# Patient Record
Sex: MALE | Race: WHITE | Employment: OTHER | URBAN - METROPOLITAN AREA
[De-identification: names, ages, dates, MRNs, and addresses within clinical notes are randomized per-mention and may not be internally consistent; named-entity substitution may affect disease eponyms.]

---

## 2018-10-10 ENCOUNTER — HOSPITAL ENCOUNTER (EMERGENCY)
Age: 83
Discharge: HOME | End: 2018-10-10
Attending: EMERGENCY MEDICINE
Payer: MEDICARE

## 2018-10-10 ENCOUNTER — APPOINTMENT (OUTPATIENT)
Dept: CT IMAGING | Age: 83
End: 2018-10-10
Attending: EMERGENCY MEDICINE
Payer: MEDICARE

## 2018-10-10 VITALS
SYSTOLIC BLOOD PRESSURE: 145 MMHG | RESPIRATION RATE: 16 BRPM | DIASTOLIC BLOOD PRESSURE: 108 MMHG | OXYGEN SATURATION: 95 % | TEMPERATURE: 98.2 F
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
## 2018-10-10 DIAGNOSIS — S01.01XA LACERATION OF SCALP, INITIAL ENCOUNTER: ICD-10-CM

## 2018-10-10 LAB
ABO + RH BLD: NORMAL
ALBUMIN SERPL-MCNC: 3.3 G/DL (ref 3.5–5)
ALBUMIN/GLOB SERPL: 0.6 {RATIO} (ref 1.1–2.2)
ALP SERPL-CCNC: 79 U/L (ref 45–117)
ALT SERPL-CCNC: 38 U/L (ref 12–78)
ANION GAP SERPL CALC-SCNC: 10 MMOL/L (ref 5–15)
APPEARANCE UR: CLEAR
APTT PPP: 27 SEC (ref 22.1–32)
AST SERPL-CCNC: 112 U/L (ref 15–37)
BACTERIA URNS QL MICRO: NEGATIVE /HPF
BASOPHILS # BLD: 0.1 K/UL (ref 0–0.1)
BASOPHILS NFR BLD: 1 % (ref 0–1)
BILIRUB SERPL-MCNC: 1.4 MG/DL (ref 0.2–1)
BILIRUB UR QL: NEGATIVE
BLOOD BANK CMNT PATIENT-IMP: NORMAL
BLOOD GROUP ANTIBODIES SERPL: NORMAL
BUN SERPL-MCNC: 13 MG/DL (ref 6–20)
BUN/CREAT SERPL: 7 (ref 12–20)
CALCIUM SERPL-MCNC: 11.2 MG/DL (ref 8.5–10.1)
CHLORIDE SERPL-SCNC: 97 MMOL/L (ref 97–108)
CO2 SERPL-SCNC: 26 MMOL/L (ref 21–32)
COLOR UR: ABNORMAL
CREAT SERPL-MCNC: 1.97 MG/DL (ref 0.7–1.3)
DIFFERENTIAL METHOD BLD: ABNORMAL
EOSINOPHIL # BLD: 0 K/UL (ref 0–0.4)
EOSINOPHIL NFR BLD: 1 % (ref 0–7)
EPITH CASTS URNS QL MICRO: ABNORMAL /LPF
ERYTHROCYTE [DISTWIDTH] IN BLOOD BY AUTOMATED COUNT: 14.6 % (ref 11.5–14.5)
GLOBULIN SER CALC-MCNC: 5.8 G/DL (ref 2–4)
GLUCOSE SERPL-MCNC: 92 MG/DL (ref 65–100)
GLUCOSE UR STRIP.AUTO-MCNC: NEGATIVE MG/DL
HCT VFR BLD AUTO: 43.1 % (ref 36.6–50.3)
HGB BLD-MCNC: 14.7 G/DL (ref 12.1–17)
HGB UR QL STRIP: NEGATIVE
IMM GRANULOCYTES # BLD: 0.1 K/UL (ref 0–0.04)
IMM GRANULOCYTES NFR BLD AUTO: 2 % (ref 0–0.5)
INR PPP: 1.2 (ref 0.9–1.1)
KETONES UR QL STRIP.AUTO: NEGATIVE MG/DL
LEUKOCYTE ESTERASE UR QL STRIP.AUTO: NEGATIVE
LYMPHOCYTES # BLD: 1.5 K/UL (ref 0.8–3.5)
LYMPHOCYTES NFR BLD: 24 % (ref 12–49)
MCH RBC QN AUTO: 34.3 PG (ref 26–34)
MCHC RBC AUTO-ENTMCNC: 34.1 G/DL (ref 30–36.5)
MCV RBC AUTO: 100.5 FL (ref 80–99)
MONOCYTES # BLD: 0.4 K/UL (ref 0–1)
MONOCYTES NFR BLD: 6 % (ref 5–13)
NEUTS SEG # BLD: 3.9 K/UL (ref 1.8–8)
NEUTS SEG NFR BLD: 66 % (ref 32–75)
NITRITE UR QL STRIP.AUTO: NEGATIVE
NRBC # BLD: 0 K/UL (ref 0–0.01)
NRBC BLD-RTO: 0 PER 100 WBC
PH UR STRIP: 6.5 [PH] (ref 5–8)
PLATELET # BLD AUTO: 150 K/UL (ref 150–400)
PMV BLD AUTO: 13 FL (ref 8.9–12.9)
POTASSIUM SERPL-SCNC: 3.9 MMOL/L (ref 3.5–5.1)
PROT SERPL-MCNC: 9.1 G/DL (ref 6.4–8.2)
PROT UR STRIP-MCNC: ABNORMAL MG/DL
PROTHROMBIN TIME: 11.8 SEC (ref 9–11.1)
RBC # BLD AUTO: 4.29 M/UL (ref 4.1–5.7)
RBC #/AREA URNS HPF: ABNORMAL /HPF (ref 0–5)
SODIUM SERPL-SCNC: 133 MMOL/L (ref 136–145)
SP GR UR REFRACTOMETRY: 1.01 (ref 1–1.03)
SPECIMEN EXP DATE BLD: NORMAL
THERAPEUTIC RANGE,PTTT: NORMAL SECS (ref 58–77)
TROPONIN I SERPL-MCNC: <0.05 NG/ML
UA: UC IF INDICATED,UAUC: ABNORMAL
UR CULT HOLD, URHOLD: NORMAL
UROBILINOGEN UR QL STRIP.AUTO: 2 EU/DL (ref 0.2–1)
WBC # BLD AUTO: 6 K/UL (ref 4.1–11.1)
WBC URNS QL MICRO: ABNORMAL /HPF (ref 0–4)

## 2018-10-10 PROCEDURE — 77030018836 HC SOL IRR NACL ICUM -A

## 2018-10-10 PROCEDURE — 97161 PT EVAL LOW COMPLEX 20 MIN: CPT

## 2018-10-10 PROCEDURE — 86900 BLOOD TYPING SEROLOGIC ABO: CPT | Performed by: EMERGENCY MEDICINE

## 2018-10-10 PROCEDURE — 36415 COLL VENOUS BLD VENIPUNCTURE: CPT | Performed by: EMERGENCY MEDICINE

## 2018-10-10 PROCEDURE — 85610 PROTHROMBIN TIME: CPT | Performed by: EMERGENCY MEDICINE

## 2018-10-10 PROCEDURE — 85730 THROMBOPLASTIN TIME PARTIAL: CPT | Performed by: EMERGENCY MEDICINE

## 2018-10-10 PROCEDURE — 90471 IMMUNIZATION ADMIN: CPT

## 2018-10-10 PROCEDURE — G8979 MOBILITY GOAL STATUS: HCPCS

## 2018-10-10 PROCEDURE — 90715 TDAP VACCINE 7 YRS/> IM: CPT | Performed by: EMERGENCY MEDICINE

## 2018-10-10 PROCEDURE — 81001 URINALYSIS AUTO W/SCOPE: CPT | Performed by: EMERGENCY MEDICINE

## 2018-10-10 PROCEDURE — G8978 MOBILITY CURRENT STATUS: HCPCS

## 2018-10-10 PROCEDURE — 84484 ASSAY OF TROPONIN QUANT: CPT | Performed by: EMERGENCY MEDICINE

## 2018-10-10 PROCEDURE — 99285 EMERGENCY DEPT VISIT HI MDM: CPT

## 2018-10-10 PROCEDURE — G8980 MOBILITY D/C STATUS: HCPCS

## 2018-10-10 PROCEDURE — 70450 CT HEAD/BRAIN W/O DYE: CPT

## 2018-10-10 PROCEDURE — 74011250636 HC RX REV CODE- 250/636: Performed by: EMERGENCY MEDICINE

## 2018-10-10 PROCEDURE — 77030008460 HC STPLR SKN PRECIS 3M -A

## 2018-10-10 PROCEDURE — 97116 GAIT TRAINING THERAPY: CPT

## 2018-10-10 PROCEDURE — 85025 COMPLETE CBC W/AUTO DIFF WBC: CPT | Performed by: EMERGENCY MEDICINE

## 2018-10-10 PROCEDURE — 75810000293 HC SIMP/SUPERF WND  RPR

## 2018-10-10 PROCEDURE — 80053 COMPREHEN METABOLIC PANEL: CPT | Performed by: EMERGENCY MEDICINE

## 2018-10-10 PROCEDURE — 72125 CT NECK SPINE W/O DYE: CPT

## 2018-10-10 RX ADMIN — TETANUS TOXOID, REDUCED DIPHTHERIA TOXOID AND ACELLULAR PERTUSSIS VACCINE, ADSORBED 0.5 ML: 5; 2.5; 8; 8; 2.5 SUSPENSION INTRAMUSCULAR at 10:23

## 2018-10-10 NOTE — ED PROVIDER NOTES
HPI Comments: 80 y.o. male with past medical history significant for HTN, GERD, high cholesterol, DJD, prostate ca, hyperparathyroidism, graves dz, who presents from EMS for evaluation of fall. Pt has 5 day onset of fall resulting in trauma to the head and right face under unknown circumstances. He incurred a laceration on his head, and observed bleeding from the wounded area. Pt has no memory of the event, and wasn't medically evaluated afterwards. He was observed this morning on camera sliding out of bed at 5:00 AM. Pt lives w/ daughter at home. No anti-coagulants being taken; unsure compliance w/ other medications. There are no other acute medical concerns at this time. PCP: Jose Saeed MD 
 
Full history, physical exam, and ROS unable to be obtained due to:  Poor historian. Note written by Karime Beckham, as dictated by Loliat Arenas MD 9:35 AM 
 
The history is provided by the patient. History limited by: poor historian. No  was used. Past Medical History:  
Diagnosis Date  Cancer [facility])   
 prostate  DJD (degenerative joint disease)  Elevated cholesterol  GERD (gastroesophageal reflux disease)  Hyperparathyroidism, primary ([facility] [address]  Hypertension  Obstructive sleep apnea   
 uses cpap  Thyroid disease HX of Graves Disease Past Surgical History:  
Procedure Laterality Date  HX HEENT    
 thyroidectomy  HX HEENT    
 tonsilectomy [address]  HX ORTHOPAEDIC    
 right femoral neck fracture  HX ORTHOPAEDIC    
 bilateral ankle surgery  HX ORTHOPAEDIC  12/12/12 LEFT ANKLE REFUSION AND REMOVAL POSTERIOR SCREW  
 [place] COLONOSCOPY FLX DX W/COLLJ SPEC WHEN PFRMD  12/11/2006 Dr Maria Alejandra Martell  repeat 12/2011  [place] PROSTATE BIOPSY, NEEDLE, SATURATION SAMPLING Family History:  
Problem Relation Age of Onset  Hypertension Mother  Heart Disease Father CAD  Heart Disease Brother Social History Social History  Marital status:  Spouse name: N/A  
 Number of children: N/A  
 Years of education: N/A Occupational History  Not on file. Social History Main Topics  Smoking status: Never Smoker  Smokeless tobacco: Never Used  Alcohol use Yes Comment: rare  Drug use: No  
 Sexual activity: Not on file Other Topics Concern  Not on file Social History Narrative ALLERGIES: Review of patient's allergies indicates no known allergies. Review of Systems Unable to perform ROS: Other Vitals:  
 10/10/18 0930 BP: (!) 146/100 SpO2: 93% Physical Exam  
Constitutional: He is oriented to person, place, and time. He appears well-developed and well-nourished. No distress. Awake, alert, no memory recall HENT:  
Head: Normocephalic and atraumatic. Eyes: Pupils are equal, round, and reactive to light. Neck: Normal range of motion. Neck supple. Cardiovascular: Normal rate, regular rhythm and normal heart sounds. Exam reveals no gallop and no friction rub. No murmur heard. Pulmonary/Chest: Effort normal and breath sounds normal. No respiratory distress. He has no wheezes. Abdominal: Soft. Bowel sounds are normal. He exhibits no distension. There is no tenderness. There is no rebound and no guarding. Musculoskeletal: Normal range of motion. Neurological: He is alert and oriented to person, place, and time. Skin: Skin is warm. Laceration (back of head) noted. No rash noted. He is not diaphoretic.  
scattered skin tears on UE and LE; hemostatic Psychiatric: He has a normal mood and affect. His behavior is normal. Judgment and thought content normal.  
Nursing note and vitals reviewed. [facility] 
 
 
ED Course This is an 55-year-old male with past medical history, review of systems, physical exam as above, presenting with complaints of a level fall and head injury. His daughter states he had a fall out of bed last week, for which he was not evaluated. She states recently installed camera, identified him falling out of bed between 4 and 5 this morning. He presents awake and alert, without complaint, however with a significant amount of dry blood on his occiput. He has otherwise unremarkable physical exam with the exception of scattered hemostatic skin tears, ecchymosis, clear breath sounds, soft nontender abdomen. He denies using anticoagulants. Plan to obtain CT imaging of the head and C-spine, CBC, CMP, cardiac enzymes, UA. Will provide wound care, and make a disposition the patient's diagnostics and response to therapy. WOUND REPAIR Date/Time: 10/10/2018 12:26 PM 
Preparation: sterile field established Location details: scalp Wound length:2.6 - 7.5 cm Foreign bodies: no foreign bodies Irrigation solution: saline Irrigation method: tap Debridement: none Skin closure: staples Number of sutures: 5 Technique: simple and interrupted Approximation: close Patient tolerance: Patient tolerated the procedure well with no immediate complications My total time at bedside, performing this procedure was 1-15 minutes. 1:08 PM 
Patient with unremarkable imaging and lab work, patient evaluated by CM and PT, home health services being established. Will Dc home with fall precautions, staple removal and return precautions.

## 2018-10-10 NOTE — SENIOR SERVICES NOTE
physical Therapy Emergency Department EVALUATION/DISCHARGE Patient: Ora Conley (41 y.o. male) Date: 10/10/2018 Primary Diagnosis: There are no admission diagnoses documented for this encounter. Precautions: [place]; fall risk ASSESSMENT : 
Chart reviewed. Patient cleared to be seen by MD. Patient presents to the ED s/p a fall at home resulting in head laceration. Awaiting staples. Head and C-spine CT negative for fracture. Of concern, this is the patient's second fall in 4 days. Patient is [place] and has some confusion but answering questions appropriately. Daughters at bedside. Daughter reporting that his legs \"seem to give out\". No LOC. Patient needing mod assist to sit EOB. Patient has a large bed at home in which he holds onto the bed frame to get himself out of bed. BP high but stable with positional change. No dizziness reported. During bed mobility, patient c/o of right lateral mid back pain. Daughter concerned because patient rarely complains of pain. No tenderness to palpation and pain is not exacerbated with standing or walking. No bruising. Suspect soreness from fall. Patient did not mention the pain again throughout treatment. Ambulating x 120 feet with RW, CGA. Narrow MARC observed with decreased step length. Patient and family confirming that patient is \"walking normally\". No LOB or unsteadiness observed. No pain or weakness observed in the LEs. As patient has had two falls in the last 4 days, recommend HHPT/OT for home safety evaluation, balance retraining and generalized strengthening. CM aware and order placed. Further acute physical therapy is not indicated at this time. PLAN : 
Discharge Recommendations:  
 
[]   Home with family []   Skilled nursing facility []   Admission to hospital with rehab likely needed 
[]   Inpatient rehab referral 
[]   Outpatient physical therapy referral 
[x]   Other: HHPT/ OT services Further Equipment Recommendations for Discharge:  none 
[]   Rolling walker with 5\" wheels 
[]   Crutches  
[]   Cane  
[]   Wheelchair  
[]   Other: COMMUNICATION/EDUCATION:  
Communication/Collaboration: 
[x]   Fall prevention education was provided and the patient/caregiver indicated understanding. [x]   Patient/family have participated as able and agree with findings and recommendations. []   Patient is unable to participate in plan of care at this time. Findings and recommendations were discussed with: MD physician and  SUBJECTIVE:  
Patient stated I think lunch is on you.  OBJECTIVE DATA SUMMARY:  
HISTORY:   
Past Medical History:  
Diagnosis Date  Cancer [facility])   
 prostate  DJD (degenerative joint disease)  Elevated cholesterol  GERD (gastroesophageal reflux disease)  Hyperparathyroidism, primary ([facility] [address]  Hypertension  Obstructive sleep apnea   
 uses cpap  Thyroid disease HX of Graves Disease Past Surgical History:  
Procedure Laterality Date  HX HEENT    
 thyroidectomy  HX HEENT    
 tonsilectomy [address] [id number]  HX ORTHOPAEDIC    
 right femoral neck fracture  HX ORTHOPAEDIC    
 bilateral ankle surgery  HX ORTHOPAEDIC  12/12/12 LEFT ANKLE REFUSION AND REMOVAL POSTERIOR SCREW  
 [facility] COLONOSCOPY FLX DX W/COLLJ SPEC WHEN PFRMD  12/11/2006 Dr Melisas Bhat  repeat 12/2011  [facility] PROSTATE BIOPSY, NEEDLE, SATURATION SAMPLING Prior Level of Function/Home Situation: Patient lives with family members. Ambulates short household distances with a rollator. Second fall within 4 days. Personal factors and/or comorbidities impacting plan of care:  
 
Home Situation Home Environment: Private residence # Steps to Enter: 0 One/Two Story Residence: One story Living Alone: No 
Support Systems: Child(lexi), Family member(s) Patient Expects to be Discharged to[de-identified] Private residence Current DME Used/Available at Home: Maribel Puente, rollator, Walker, rolling EXAMINATION/PRESENTATION/DECISION MAKING:  
  
Strength:   
Strength: Generally decreased, functional 
 
Tone & Sensation:  
Tone: Normal 
   
Coordination: 
Coordination: Generally decreased, functional 
 
Functional Mobility: 
Bed Mobility: 
Supine to Sit: Moderate assistance;Assist x1 Sit to Supine: Contact guard assistance Transfers: 
Sit to Stand: Contact guard assistance Stand to Sit: Contact guard assistance Balance:  
Sitting: Impaired Sitting - Static: Good (unsupported) Sitting - Dynamic: Fair (occasional) Standing: Impaired Standing - Static: Good;Constant support Standing - Dynamic : Fair Ambulation/Gait Training: 
Distance (ft): 120 Feet (ft) Assistive Device: Gait belt;Walker, rolling Ambulation - Level of Assistance: Contact guard assistance; Additional time; Adaptive equipment Gait Abnormalities: Decreased step clearance;Scissoring Base of Support: Narrowed Speed/Allison: Slow Step Length: Right shortened;Left shortened Special Tests: 
10 Meter walk test:  (Specify if any supplemental oxygen is used, the type, pre, during and post sats.) Self-Selected Or Fast-Velocity: Self Selected Velocity Trial 1: 29 Seconds Trial 2: 29 Seconds Trial 3: 29 Seconds Average : 29 Seconds Score: 0.21 m/s Walking Speed (m/s) Modifier Scale Age 52-63 Age 61-76 Age 66-77 Age 80-80 Male Female Male Female Male Female Male Female CH 
 0% Impaired ? 1.39 ? 1.40 ? 1.36 ? 1.30 ? 1.33 ? 1.27 ? 1.21 ? 1.15  
CI  
1-19% Impaired 1.11-1.38 1.12-1.39 1.09-1.35 1.04-1.29 1.06-1.32 1.01-1.26 0.96-1.20 0.92-1.14 CJ  
20-39% Impaired 0.83-1.10 0.84-1.11 0.82-1.08 0.78-1.03 0.80-1.05 0.76-1.00 0.72-0.95 0.69-0.91 CK  
40-59% Impaired 0.56-0.82 0.57-0.83 0.54-0.81 0.52-0.77 0.53-0.79 0.51-0.75 0.48-0.71 0.46-0.68 CL  
60-79% Impaired 0.28-0.55 0.28-0.56 0.27-0.53 0.26-0.51 0.27-0.52 0.25-0.50 0.24-0.49 0.23-0.45  
 CM 
 80-99% Impaired 0.01-0.28 < 0.01-0.28 < 0.01-0.27 < 0.01-0.26 0.01-0.27 0.01-0.24 0.01-0.23 0.01-0.22  
CN  
100% Impaired Cannot Perform Minimal Detectable Change (MDC-90) = 0.1 m/s Dimitri SIDHU. \"Comfortable and maximum walking speed of adults aged 20-79 years: reference values and determinants. \" Age and Agin Volume 26(1):15-9. Scottie Jones. \"Age- and gender-related test performance in community-dwelling elderly people: Six-Minute Walk Test, Ward Balance Scale, Timed Up & Go Test, and gait speeds. \" Physical Therapy: 2002 Volume 82(2):128-37. Augusto Harvey DM, Navdeep ESPINOZA, Luis Daniel SHEPARDD, Dawood JORDAN. \"Assessing stability and change of four performance measures: a longitudinal study evaluating outcome following total hip and knee arthroplasty. \" [facility] Musculoskeletal Disorders: 2005 Volume 6(3). Ricardo Garcia, PhD; Belgica Harris, PhD. Isabel Factor Paper: \"Walking Speed: the Sixth Vital Sign\" Journal of Geriatric Physical Therapy: 2009 - Volume 32 - Issue 2 - p 25 . In compliance with CMSs Claims Based Outcome Reporting, the following G-code set was chosen for this patient based on their primary functional limitation being treated: The outcome measure chosen to determine the severity of the functional limitation was the 10 MWT with a score of 0.21 m/s which was correlated with the impairment scale. ? Mobility - Walking and Moving Around:  
  - CURRENT STATUS: CM - 80%-99% impaired, limited or restricted  - GOAL STATUS: CM - 80%-99% impaired, limited or restricted  - D/C STATUS:  CM - 80%-99% impaired, limited or restricted Physical Therapy Evaluation Charge Determination History Examination Presentation Decision-Making MEDIUM  Complexity : 1-2 comorbidities / personal factors will impact the outcome/ POC  LOW Complexity : 1-2 Standardized tests and measures addressing body structure, function, activity limitation and / or participation in recreation  LOW Complexity : Stable, uncomplicated  LOW Complexity : FOTO score of  Based on the above components, the patient evaluation is determined to be of the following complexity level: LOW Pain: 
 Patient reporting minimal R lateral mid back pain with bed mobility. No tenderness to palpation. No bruising. Not exacerbated with with standing or walking. Activity Tolerance:  
WFL. Vitals stable. Please refer to the flowsheet for vital signs taken during this treatment. After treatment:  
[]         Patient left in no apparent distress sitting up in chair 
[x]         Patient left in no apparent distress in bed 
[x]         Call bell left within reach [x]         Nursing notified 
[x]         Caregiver present 
[]         Bed alarm activated Thank you for this referral. 
Kimberly Mcclelland PT, DPT Geriatric Clinical Specialist  
 
 Time Calculation: 22 mins

## 2018-10-10 NOTE — ED NOTES
Pt cleaned up for large amount of coagulated blood to pts head, face and neck. Laceration noted to L side, top of head. No active bleeding at this time.

## 2018-10-10 NOTE — PROGRESS NOTES
Date of previous inpatient admission/ ED visit? 4/21/16 ED visit What brought the patient back to ED? Patient presents to the ED s/p fall. Did patient decline recommended services during last admission/ ED visit (if yes, what)? No 
 
Has patient seen a provider since their last inpatient admission/ED visit (if yes, when)? Yes. Patient is followed  by Dr. Ramakrishna Asif. CM Interventions: 
From previous inpatient admission/ED visit: Assessment From current inpatient admission/ED visit: Assessment SSED/CM consult received and appreciated. EMR reviewed. History significant for HTN, GERD, high cholesterol, DJD, prostate ca, hyperparathyroidism and graves disease. Patient present to the ED for evaluation of fall. Met w/patient, daughter Lugene Gravely and granddaughters at bedside. Patient is alert however Alutiiq requested daughter provide history. Lenin Pinto lives in his own home and daughter Raegan Hickman moved in 11 years ago. Luana Diaz works W/F and granddaughter is home to assist [phone] during the weekday and daughter evenings/weekends. DME includes a rollator. No previous HH/ SNFs. Lenin Pinto was hospitalized at [facility] family estimates 5-10 yrs ago following hip surgery. Community resources provided including [facility] and Private Duty List. Informed CM to revisit after final results and PT evaluation. PCP is Dr. Ramakrishna Asif. Local Pharmacy is [facility]. [facility] Medicare A[facility]B and [employer] are insurance providers. Care Management Interventions PCP Verified by CM: Yes Last Visit to PCP: 08/10/18 Palliative Care Criteria Met (RRAT>21 & CHF Dx)?: No 
Transition of Care Consult (CM Consult): Discharge Planning MyChart Signup: No 
Discharge Durable Medical Equipment: No 
Health Maintenance Reviewed: Yes Physical Therapy Consult: Yes Occupational Therapy Consult: No 
Speech Therapy Consult: No 
Current Support Network: Own Home, Lives with Caregiver Confirm Follow Up Transport: Family Plan discussed with Pt/Family/Caregiver: Yes [employer] Information Provided?: No 
Discharge Location Discharge Placement:  (TBD)

## 2018-10-10 NOTE — PROGRESS NOTES
Order received for [facility]. Met back w/ patient and family provided choice list of [facility] agencies. [facility] does not have a preference. Demographics verified. CM will call home this afternoon and provided updates. Daughter to drive patient home when discharged. Updates provided to [facility].

## 2018-10-10 NOTE — ED TRIAGE NOTES
Pt arrives with \"slip and fall\" where pt hit head on a file cabinet. + laceration to top of head. EMS reports approx 100 mls. Pt is poor historian. Reports frequent falling recently.

## 2018-10-10 NOTE — PROGRESS NOTES
Referral sent to [facility] via [facility]. has accepted. CM provided Zerita Cover / daughter's # as point of reference since patient is [facility]. Call placed to [facility] and updates provided. No additional needs verbalized at this time.

## 2018-10-10 NOTE — DISCHARGE INSTRUCTIONS
Cuts Closed With Staples: Care Instructions  Your Care Instructions  A cut can happen anywhere on your body. The doctor used staples to close the cut. Staples easily and quickly close a cut, which helps the cut heal.  Sometimes a cut can injure tendons, blood vessels, or nerves. If the cut went deep and through the skin, the doctor may have put in a layer of stitches below the staples. The deeper layer of stitches brings the deep part of the cut together. These stitches will dissolve and don't need to be removed. The staples in the upper layer are what you see on the cut. You may have a bandage. You will need to have the staples removed, usually in 7 to 14 days. The doctor has checked you carefully, but problems can develop later. If you notice any problems or new symptoms, get medical treatment right away. Follow-up care is a key part of your treatment and safety. Be sure to make and go to all appointments, and call your doctor if you are having problems. It's also a good idea to know your test results and keep a list of the medicines you take. How can you care for yourself at home? · Keep the cut dry for the first 24 to 48 hours. After this, you can shower if your doctor okays it. Pat the cut dry. · Don't soak the cut, such as in a bathtub. Your doctor will tell you when it's safe to get the cut wet. · If your doctor told you how to care for your cut, follow your doctor's instructions. If you did not get instructions, follow this general advice:  ¨ After the first 24 to 48 hours, wash around the cut with clean water 2 times a day. Don't use hydrogen peroxide or alcohol, which can slow healing. ¨ You may cover the cut with a thin layer of petroleum jelly, such as Vaseline, and a nonstick bandage. ¨ Apply more petroleum jelly and replace the bandage as needed. · Avoid any activity that could cause your cut to reopen. · Do not remove the staples on your own.  Your doctor will tell you when to come back to have the staples removed. · Take pain medicines exactly as directed. ¨ If the doctor gave you a prescription medicine for pain, take it as prescribed. ¨ If you are not taking a prescription pain medicine, ask your doctor if you can take an over-the-counter medicine. When should you call for help? Call your doctor now or seek immediate medical care if:    · You have new pain, or your pain gets worse.     · The skin near the cut is cold or pale or changes color.     · You have tingling, weakness, or numbness near the cut.     · The cut starts to bleed, and blood soaks through the bandage. Oozing small amounts of blood is normal.     · You have trouble moving the area near the cut.     · You have symptoms of infection, such as:  ¨ Increased pain, swelling, warmth, or redness around the cut. ¨ Red streaks leading from the cut. ¨ Pus draining from the cut. ¨ A fever.    Watch closely for changes in your health, and be sure to contact your doctor if:    · You do not get better as expected. Where can you learn more? Go to http://yaHealthcareMagickeara.info/. Enter S027 in the search box to learn more about \"Cuts Closed With Staples: Care Instructions. \"  Current as of: November 20, 2017  Content Version: 11.8  © 6356-6968 [employer]. Care instructions adapted under license by [employer] (which disclaims liability or warranty for this information). If you have questions about a medical condition or this instruction, always ask your healthcare professional. [place] [address] any warranty or liability for your use of this information. Preventing Falls: Care Instructions  Your Care Instructions    Getting around your home safely can be a challenge if you have injuries or health problems that make it easy for you to fall.  Loose rugs and furniture in walkways are among the dangers for many older people who have problems walking or who have poor eyesight. People who have conditions such as arthritis, osteoporosis, or dementia also have to be careful not to fall. You can make your home safer with a few simple measures. Follow-up care is a key part of your treatment and safety. Be sure to make and go to all appointments, and call your doctor if you are having problems. It's also a good idea to know your test results and keep a list of the medicines you take. How can you care for yourself at home? Taking care of yourself  · You may get dizzy if you do not drink enough water. To prevent dehydration, drink plenty of fluids, enough so that your urine is light yellow or clear like water. Choose water and other caffeine-free clear liquids. If you have kidney, heart, or liver disease and have to limit fluids, talk with your doctor before you increase the amount of fluids you drink. · Exercise regularly to improve your strength, muscle tone, and balance. Walk if you can. Swimming may be a good choice if you cannot walk easily. · Have your vision and hearing checked each year or any time you notice a change. If you have trouble seeing and hearing, you might not be able to avoid objects and could lose your balance. · Know the side effects of the medicines you take. Ask your doctor or pharmacist whether the medicines you take can affect your balance. Sleeping pills or sedatives can affect your balance. · Limit the amount of alcohol you drink. Alcohol can impair your balance and other senses. · Ask your doctor whether calluses or corns on your feet need to be removed. If you wear loose-fitting shoes because of calluses or corns, you can lose your balance and fall. · Talk to your doctor if you have numbness in your feet. Preventing falls at home  · Remove raised doorway thresholds, throw rugs, and clutter. Repair loose carpet or raised areas in the floor. · Move furniture and electrical cords to keep them out of walking paths.   · Use nonskid floor wax, and wipe up spills right away, especially on ceramic tile floors. · If you use a walker or cane, put rubber tips on it. If you use crutches, clean the bottoms of them regularly with an abrasive pad, such as steel wool. · Keep your house well lit, especially [place], and outside walkways. Use night-lights in areas such as hallways and bathrooms. Add extra light switches or use remote switches (such as switches that go on or off when you clap your hands) to make it easier to turn lights on if you have to get up during the night. · Install sturdy handrails on stairways. · Move items in your cabinets so that the things you use a lot are on the lower shelves (about waist level). · Keep a cordless phone and a flashlight with new batteries by your bed. If possible, put a phone in each of the main rooms of your house, or carry a cell phone in case you fall and cannot reach a phone. Or, you can wear a device around your neck or wrist. You push a button that sends a signal for help. · Wear low-heeled shoes that fit well and give your feet good support. Use footwear with nonskid soles. Check the heels and soles of your shoes for wear. Repair or replace worn heels or soles. · Do not wear socks without shoes on wood floors. · Walk on the grass when the sidewalks are slippery. If you live in an area that gets snow and ice in the winter, sprinkle salt on slippery steps and sidewalks. Preventing falls in the bath  · Install grab bars and nonskid mats inside and outside your shower or tub and near the toilet and sinks. · Use shower chairs and bath benches. · Use a hand-held shower head that will allow you to sit while showering. · Get into a tub or shower by putting the weaker leg in first. Get out of a tub or shower with your strong side first.  · Repair loose toilet seats and consider installing a raised toilet seat to make getting on and off the toilet easier.   · Keep your bathroom door unlocked while you are in the shower. Where can you learn more? Go to http://ya-keara.info/. Enter 0476 [id number] in the search box to learn more about \"Preventing Falls: Care Instructions. \"  Current as of: March 16, 2018  Content Version: 11.8  © 9260-3161 [employer], [employer]. Care instructions adapted under license by [employer] (which disclaims liability or warranty for this information). If you have questions about a medical condition or this instruction, always ask your healthcare professional. [place] [address] any warranty or liability for your use of this information.

## 2018-10-10 NOTE — SENIOR SERVICES NOTE
Face to Face Encounter Patients Name: Ben Harmon    YOB: 1931 Primary Diagnosis: Fall; head laceration Date of Face to Face:   10/10/2018 Face to Face Encounter findings are related to primary reason for home care:   yes 1. I certify that the patient needs intermittent skilled nursing care, physical therapy and/or speech therapy. I will not be following this patient in the Community and Dr. Rik Martin will be responsible for signing the [address] 2. Initial Orders for Care: Must be completed only if Face to Face MD will not be signing the [address] Physical Therapy, Occupational Therapy and Medical  3. I certify that this patient is homebound for the following reason(s): Requires considerable and taxing effort to leave the home  and Only leaves the home for medical reasons or [place] services and are infrequent and of short duration for other reasons 4. I certify that this patient is under my care and that I, or a nurse practitioner or  [id number] working with me, had a Face-to-Face Encounter that meets the physician Face-to-Face Encounter requirements. Document the physical findings from the [facility] Encounter that support the need for skilled services: Needs skilled safety assessment and interventions  and Has new finding of weakness and altered mobility that requires skilled physical/occupational and/or speech therapy services for evaluation and interventions. Richard Price 10/10/2018

## 2018-10-12 ENCOUNTER — TELEPHONE (OUTPATIENT)
Dept: INTERNAL MEDICINE CLINIC | Facility: CLINIC | Age: 83
End: 2018-10-12

## 2018-10-12 NOTE — TELEPHONE ENCOUNTER
Per Ad Ivey MD / telephone  Received: Today       [address]                     Ciaran Fish a Physical therapist [facility] just saw the pt and would need a verbal order from the  To do PT.  Tiarra's contact [phone]

## 2018-12-13 ENCOUNTER — APPOINTMENT (OUTPATIENT)
Dept: GENERAL RADIOLOGY | Age: 83
End: 2018-12-13
Attending: NURSE PRACTITIONER
Payer: MEDICARE

## 2018-12-13 ENCOUNTER — APPOINTMENT (OUTPATIENT)
Dept: ULTRASOUND IMAGING | Age: 83
End: 2018-12-13
Attending: NURSE PRACTITIONER
Payer: MEDICARE

## 2018-12-13 ENCOUNTER — HOSPITAL ENCOUNTER (EMERGENCY)
Age: 83
Discharge: HOME | End: 2018-12-13
Attending: EMERGENCY MEDICINE | Admitting: EMERGENCY MEDICINE
Payer: MEDICARE

## 2018-12-13 ENCOUNTER — APPOINTMENT (OUTPATIENT)
Dept: CT IMAGING | Age: 83
End: 2018-12-13
Attending: NURSE PRACTITIONER
Payer: MEDICARE

## 2018-12-13 VITALS
TEMPERATURE: 97.3 F | SYSTOLIC BLOOD PRESSURE: 124 MMHG | HEART RATE: 67 BPM | DIASTOLIC BLOOD PRESSURE: 73 MMHG | RESPIRATION RATE: 15 BRPM | WEIGHT: 230 LBS | BODY MASS INDEX: 29.52 KG/M2 | OXYGEN SATURATION: 99 % | HEIGHT: 74 IN
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
## 2018-12-13 LAB
ALBUMIN SERPL-MCNC: 2.9 G/DL (ref 3.5–5)
ALBUMIN/GLOB SERPL: 0.6 {RATIO} (ref 1.1–2.2)
ALP SERPL-CCNC: 100 U/L (ref 45–117)
ALT SERPL-CCNC: 13 U/L (ref 12–78)
ANION GAP SERPL CALC-SCNC: 8 MMOL/L (ref 5–15)
APPEARANCE UR: CLEAR
AST SERPL-CCNC: 20 U/L (ref 15–37)
ATRIAL RATE: 81 BPM
BACTERIA URNS QL MICRO: NEGATIVE /HPF
BASOPHILS # BLD: 0 K/UL (ref 0–0.1)
BASOPHILS NFR BLD: 0 % (ref 0–1)
BILIRUB SERPL-MCNC: 2.4 MG/DL (ref 0.2–1)
BILIRUB UR QL CFM: NEGATIVE
BUN SERPL-MCNC: 22 MG/DL (ref 6–20)
BUN/CREAT SERPL: 17 (ref 12–20)
CALCIUM SERPL-MCNC: 9.8 MG/DL (ref 8.5–10.1)
CALCULATED P AXIS, ECG09: -11 DEGREES
CALCULATED R AXIS, ECG10: 7 DEGREES
CALCULATED T AXIS, ECG11: 79 DEGREES
CHLORIDE SERPL-SCNC: 106 MMOL/L (ref 97–108)
CK SERPL-CCNC: 43 U/L (ref 39–308)
CO2 SERPL-SCNC: 22 MMOL/L (ref 21–32)
COLOR UR: ABNORMAL
CREAT SERPL-MCNC: 1.3 MG/DL (ref 0.7–1.3)
DIAGNOSIS, 93000: NORMAL
DIFFERENTIAL METHOD BLD: ABNORMAL
EOSINOPHIL # BLD: 0 K/UL (ref 0–0.4)
EOSINOPHIL NFR BLD: 0 % (ref 0–7)
EPITH CASTS URNS QL MICRO: ABNORMAL /LPF
ERYTHROCYTE [DISTWIDTH] IN BLOOD BY AUTOMATED COUNT: 13.2 % (ref 11.5–14.5)
GLOBULIN SER CALC-MCNC: 5.2 G/DL (ref 2–4)
GLUCOSE SERPL-MCNC: 110 MG/DL (ref 65–100)
GLUCOSE UR STRIP.AUTO-MCNC: NEGATIVE MG/DL
HCT VFR BLD AUTO: 31 % (ref 36.6–50.3)
HGB BLD-MCNC: 10.1 G/DL (ref 12.1–17)
HGB UR QL STRIP: NEGATIVE
HYALINE CASTS URNS QL MICRO: ABNORMAL /LPF (ref 0–5)
IMM GRANULOCYTES # BLD: 0.1 K/UL (ref 0–0.04)
IMM GRANULOCYTES NFR BLD AUTO: 1 % (ref 0–0.5)
KETONES UR QL STRIP.AUTO: ABNORMAL MG/DL
LEUKOCYTE ESTERASE UR QL STRIP.AUTO: NEGATIVE
LYMPHOCYTES # BLD: 1.3 K/UL (ref 0.8–3.5)
LYMPHOCYTES NFR BLD: 26 % (ref 12–49)
MCH RBC QN AUTO: 34.5 PG (ref 26–34)
MCHC RBC AUTO-ENTMCNC: 32.6 G/DL (ref 30–36.5)
MCV RBC AUTO: 105.8 FL (ref 80–99)
MONOCYTES # BLD: 0.5 K/UL (ref 0–1)
MONOCYTES NFR BLD: 10 % (ref 5–13)
NEUTS SEG # BLD: 3 K/UL (ref 1.8–8)
NEUTS SEG NFR BLD: 62 % (ref 32–75)
NITRITE UR QL STRIP.AUTO: NEGATIVE
NRBC # BLD: 0 K/UL (ref 0–0.01)
NRBC BLD-RTO: 0 PER 100 WBC
P-R INTERVAL, ECG05: 182 MS
PH UR STRIP: 6 [PH] (ref 5–8)
PLATELET # BLD AUTO: 194 K/UL (ref 150–400)
PMV BLD AUTO: 10.9 FL (ref 8.9–12.9)
POTASSIUM SERPL-SCNC: 3.6 MMOL/L (ref 3.5–5.1)
PROT SERPL-MCNC: 8.1 G/DL (ref 6.4–8.2)
PROT UR STRIP-MCNC: 30 MG/DL
Q-T INTERVAL, ECG07: 360 MS
QRS DURATION, ECG06: 100 MS
QTC CALCULATION (BEZET), ECG08: 418 MS
RBC # BLD AUTO: 2.93 M/UL (ref 4.1–5.7)
RBC #/AREA URNS HPF: ABNORMAL /HPF (ref 0–5)
SODIUM SERPL-SCNC: 136 MMOL/L (ref 136–145)
SP GR UR REFRACTOMETRY: 1.02 (ref 1–1.03)
TROPONIN I SERPL-MCNC: <0.05 NG/ML
UROBILINOGEN UR QL STRIP.AUTO: >8 EU/DL (ref 0.2–1)
VENTRICULAR RATE, ECG03: 81 BPM
WBC # BLD AUTO: 4.9 K/UL (ref 4.1–11.1)
WBC URNS QL MICRO: ABNORMAL /HPF (ref 0–4)

## 2018-12-13 PROCEDURE — 36415 COLL VENOUS BLD VENIPUNCTURE: CPT

## 2018-12-13 PROCEDURE — 73030 X-RAY EXAM OF SHOULDER: CPT

## 2018-12-13 PROCEDURE — 72100 X-RAY EXAM L-S SPINE 2/3 VWS: CPT

## 2018-12-13 PROCEDURE — 72072 X-RAY EXAM THORAC SPINE 3VWS: CPT

## 2018-12-13 PROCEDURE — 82550 ASSAY OF CK (CPK): CPT

## 2018-12-13 PROCEDURE — 97162 PT EVAL MOD COMPLEX 30 MIN: CPT

## 2018-12-13 PROCEDURE — G8979 MOBILITY GOAL STATUS: HCPCS

## 2018-12-13 PROCEDURE — 80053 COMPREHEN METABOLIC PANEL: CPT

## 2018-12-13 PROCEDURE — 93005 ELECTROCARDIOGRAM TRACING: CPT

## 2018-12-13 PROCEDURE — 76705 ECHO EXAM OF ABDOMEN: CPT

## 2018-12-13 PROCEDURE — 72125 CT NECK SPINE W/O DYE: CPT

## 2018-12-13 PROCEDURE — 97116 GAIT TRAINING THERAPY: CPT

## 2018-12-13 PROCEDURE — 81001 URINALYSIS AUTO W/SCOPE: CPT

## 2018-12-13 PROCEDURE — 73521 X-RAY EXAM HIPS BI 2 VIEWS: CPT

## 2018-12-13 PROCEDURE — 70450 CT HEAD/BRAIN W/O DYE: CPT

## 2018-12-13 PROCEDURE — 71046 X-RAY EXAM CHEST 2 VIEWS: CPT

## 2018-12-13 PROCEDURE — G8980 MOBILITY D/C STATUS: HCPCS

## 2018-12-13 PROCEDURE — 85025 COMPLETE CBC W/AUTO DIFF WBC: CPT

## 2018-12-13 PROCEDURE — 97530 THERAPEUTIC ACTIVITIES: CPT

## 2018-12-13 PROCEDURE — 99285 EMERGENCY DEPT VISIT HI MDM: CPT

## 2018-12-13 PROCEDURE — 84484 ASSAY OF TROPONIN QUANT: CPT

## 2018-12-13 PROCEDURE — G8978 MOBILITY CURRENT STATUS: HCPCS

## 2018-12-13 NOTE — PROGRESS NOTES
physical Therapy Emergency Department EVALUATION/DISCHARGE  Patient: Clarice Mullen (28 y.o. male)  Date: 12/13/2018  Primary Diagnosis: s/p Fall       Precautions: Fall     ASSESSMENT :  Based on the objective data described below, the patient presents with generalized weakness, impaired balance, decreased activity endurance, and decreased safety awareness. Patient seen in ED s/p fall. He denies any pain at this time. Tolerated ambulation with assistance but did not multiple LOB and concerns for balance. Patient and family states patient is close to baseline. He is currently receiving  PT. Reviewed with patient and his daughter, fall prevention and home safety. Spoke with daughter about things to discuss further concerning balance deficits noted this date. Patient has family assist at home and encouraged continued assist and supervision. No additional acute needs indicated. Further acute physical therapy is not indicated at this time. PLAN :  Discharge Recommendations:   [x]   Home with family, continue [facility] PT  []   Skilled nursing facility  []   Admission to hospital with rehab likely needed  []   Inpatient rehab referral  []   Outpatient physical therapy referral  []   Other:    Further Equipment Recommendations for Discharge: None, patient has needed equipment   []   Rolling walker with 5\" wheels  []   Crutches   []   Collie Harlan   []   Wheelchair   []   Other:     COMMUNICATION/EDUCATION:   Communication/Collaboration:  [x]   Fall prevention education was provided and the patient/caregiver indicated understanding. [x]   Patient/family have participated as able and agree with findings and recommendations. []   Patient is unable to participate in plan of care at this time. Findings and recommendations were discussed with: ED Nurse Practitioner      SUBJECTIVE:   Patient stated Am I going home?     OBJECTIVE DATA SUMMARY:   HISTORY:    Past Medical History:   Diagnosis Date    Cancer [facility])     prostate    DJD (degenerative joint disease)     Elevated cholesterol     GERD (gastroesophageal reflux disease)     Hyperparathyroidism, primary ([address]     Hypertension     Obstructive sleep apnea     uses cpap    Thyroid disease     HX of Graves Disease     Past Surgical History:   Procedure Laterality Date    HX HEENT      thyroidectomy    HX HEENT      tonsilectomy    HX HERNIA REPAIR  1994    HX ORTHOPAEDIC      right femoral neck fracture    HX ORTHOPAEDIC      bilateral ankle surgery    HX ORTHOPAEDIC  12/12/12    LEFT ANKLE REFUSION AND REMOVAL POSTERIOR SCREW    AR COLONOSCOPY FLX DX W/COLLJ SPEC WHEN PFRMD  12/11/2006    Dr Tamy Shipley  repeat 12/2011    AR PROSTATE BIOPSY, NEEDLE, SATURATION SAMPLING       Prior Level of Function/Home Situation: Prior modified independence with household ambulation, use of rollator walker. Assist from   Personal factors and/or comorbidities impacting plan of care:          EXAMINATION/PRESENTATION/DECISION MAKING:   Critical Behavior:     Orientation Level: Oriented to place, Disoriented to time, Oriented to person  Cognition: Follows commands     Hearing: Auditory  Auditory Impairment: [place]    Range Of Motion:   WFL                       Strength:     Generally decreased, functional                     Tone & Sensation:    Normal & Intact     Functional Mobility:  Bed Mobility:   Rolling: Supervision   Supine <> Sit: Min A   Scooting: CGA     Transfers:   Sit<>Stand: CGA     Balance:    Sitting: Static: Intact; Dynamic: Impaired; fair   Standing: Impaired; Static Fair, Dynamic Fair to Poor  Ambulation/Gait Training:      Ambulated CGA-Min A with use of Rollator walker. LOB with turning activity and all weight shifting/steps posterior. Tolerated 50 ft with 1 standing rest break. Deviations of decreased step clearance,(shuffling/dragging of feet at times), decreased suzanne, mild forward flexed posture.  Fatigue limiting distance   Special Tests:  Barthel Index:    Bathing: 0  Bladder: 5  Bowels: 10  Groomin  Dressin  Feeding: 10  Mobility: 10  Stairs: 0  Toilet Use: 5  Transfer (Bed to Chair and Back): 10  Total: 55     Barthel and G-code impairment scale:  Percentage of impairment CH  0% CI  1-19% CJ  20-39% CK  40-59% CL  60-79% CM  80-99% CN  100%   Barthel Score 0-100 100 99-80 79-60 59-40 20-39 1-19   0   Barthel Score 0-20 20 17-19 13-16 9-12 5-8 1-4 0      The Barthel ADL Index: Guidelines  1. The index should be used as a record of what a patient does, not as a record of what a patient could do. 2. The main aim is to establish degree of independence from any help, physical or verbal, however minor and for whatever reason. 3. The need for supervision renders the patient not independent. 4. A patient's performance should be established using the best available evidence. Asking the patient, friends/relatives and nurses are the usual sources, but direct observation and common sense are also important. However direct testing is not needed. 5. Usually the patient's performance over the preceding 24-48 hours is important, but occasionally longer periods will be relevant. 6. Middle categories imply that the patient supplies over 50 per cent of the effort. 7. Use of aids to be independent is allowed. Cynthia Parada., Barthel, DJenaeW. (2946). Functional evaluation: the Barthel Index. [address] [facility] (14)2. GEORGIANA Barnes, Reva Dean., Eliezer Fletcher., [place], [address] [place] [address] (). Measuring the change indisability after inpatient rehabilitation; comparison of the responsiveness of the Barthel Index and Functional [place] Measure. Journal of Neurology, Neurosurgery, and Psychiatry, 66(4), 217-127. TIKI Renae.HANNAH, NAVEEN Martinez, & Jeyson Steele MJenaeA. (2004.) Assessment of post-stroke quality of life in cost-effectiveness studies: The usefulness of the Barthel Index and the EuroQoL-5D.  Quality of Life Research, 13, 427-43      In compliance with CMSs Claims Based Outcome Reporting, the following G-code set was chosen for this patient based on their primary functional limitation being treated: The outcome measure chosen to determine the severity of the functional limitation was the Barthel Index with a score of 55/100 which was correlated with the impairment scale.     ? Mobility - Walking and Moving Around:     - CURRENT STATUS: CK - 40%-59% impaired, limited or restricted    - GOAL STATUS: CK - 40%-59% impaired, limited or restricted    - D/C STATUS:  CK - 40%-59% impaired, limited or restricted    Physical Therapy Evaluation Charge Determination   History Examination Presentation Decision-Making   MEDIUM  Complexity : 1-2 comorbidities / personal factors will impact the outcome/ POC  MEDIUM Complexity : 3 Standardized tests and measures addressing body structure, function, activity limitation and / or participation in recreation  MEDIUM Complexity : Evolving with changing characteristics  Other outcome measures Barthel Index  MEDIUM      Based on the above components, the patient evaluation is determined to be of the following complexity level: MEDIUM    Pain:  Pain Scale 1: Numeric (0 - 10)  Pain Intensity 1: 0              Activity Tolerance:   VS stable, negative orthostatics  After treatment:   []         Patient left in no apparent distress sitting up in chair  [x]         Patient left in no apparent distress in bed  [x]         Call bell left within reach  [x]         Nursing notified  [x]         Caregiver present  []         Bed alarm activated    Thank you for this referral.  Dez Latham, PT, DPT   Time Calculation: 28 mins

## 2018-12-13 NOTE — ED PROVIDER NOTES
EMERGENCY DEPARTMENT HISTORY AND PHYSICAL EXAM      Date: 12/13/2018  Patient Name: Wilian Tse    History of Presenting Illness     Chief Complaint   Patient presents with   Mo Terry reports that patient has fallen twice in past 2 days. Pt does not recall falling at 0400 this morning.  Back Pain     Lower       History Provided By: Patient    HPI: Wilian Tse, 80 y.o. male with PMHx significant for HTN, CLINT, HLD, GERD, and prostate CA, presents ambulatory from home to the ED with cc of two falls this week resulting in back pain. Family has the falls on video and it appears that the pt loses balance with his rollator and topples backward. From the video it was difficult to see if the pt hit his head. Pt denies fevers, chills, night sweats, chest pain, pressure, SOB, SINGLETON, PND, orthopnea, abdominal pain, n/v/d, melena, hematuria, dysuria, constipation, HA, dizziness, and syncope. Pt currently has not complaints but family states that he was endorsing back pain after he fell. PCP: Denise Dover MD    Current Outpatient Medications   Medication Sig Dispense Refill    potassium chloride (K-DUR, KLOR-CON) 10 mEq tablet Take 1 Tab by mouth daily. Must have made doctor appointment for refill. 90 Tab 0    levothyroxine (SYNTHROID) 150 mcg tablet Take 1 Tab by mouth Daily (before breakfast). Must have made doctor appointment for refill. 90 Tab 0    pravastatin (PRAVACHOL) 40 mg tablet Take 1 Tab by mouth daily. Must have made doctor appointment for refill. 90 Tab 0    lisinopril (PRINIVIL, ZESTRIL) 20 mg tablet Take 1 Tab by mouth daily. Must have made doctor appointment for refill. 90 Tab 0    tamsulosin (FLOMAX) 0.4 mg capsule Take 1 Cap by mouth daily. Must have made doctor appointment for refill. 90 Cap 0    GELNIQUE 28 mg/0.92 gram (3 %) glpm daily. 11    aspirin delayed-release 81 mg tablet Take  by mouth daily.  cpap machine kit by Does Not Apply route.       multivitamin with iron tablet Take 1 Tab by mouth daily.  cholecalciferol, vitamin d3, (VITAMIN D) 1,000 unit tablet Take  by mouth daily. Past History     Past Medical History:  Past Medical History:   Diagnosis Date    Cancer ([facility]     prostate    DJD (degenerative joint disease)     Elevated cholesterol     GERD (gastroesophageal reflux disease)     Hyperparathyroidism, primary ([facility]     Hypertension     Obstructive sleep apnea     uses cpap    Thyroid disease     HX of Graves Disease       Past Surgical History:  Past Surgical History:   Procedure Laterality Date    HX HEENT      thyroidectomy    HX HEENT      tonsilectomy    HX HERNIA REPAIR  1994    HX ORTHOPAEDIC      right femoral neck fracture    HX ORTHOPAEDIC      bilateral ankle surgery    HX ORTHOPAEDIC  12/12/12    LEFT ANKLE REFUSION AND REMOVAL POSTERIOR SCREW    TX COLONOSCOPY FLX DX W/COLLJ SPEC WHEN PFRMD  12/11/2006    Dr Susanne Noonan  repeat 12/2011    TX PROSTATE BIOPSY, NEEDLE, SATURATION SAMPLING         Family History:  Family History   Problem Relation Age of Onset    Hypertension Mother     Heart Disease Father         CAD    Heart Disease Brother        Social History:  Social History     Tobacco Use    Smoking status: Never Smoker    Smokeless tobacco: Never Used   Substance Use Topics    Alcohol use: Yes     Comment: rare    Drug use: No       Allergies:  No Known Allergies      Review of Systems   Review of Systems   Constitutional: Negative for activity change, appetite change, chills, diaphoresis, fatigue, fever and unexpected weight change. HENT: Negative for congestion, ear pain, rhinorrhea, sinus pressure, sore throat, tinnitus, trouble swallowing and voice change. Eyes: Negative for pain, discharge, redness and visual disturbance. Respiratory: Negative for apnea, cough, choking, chest tightness, shortness of breath, wheezing and stridor.     Cardiovascular: Negative for chest pain, palpitations and leg swelling. Gastrointestinal: Negative for abdominal pain, constipation, nausea and vomiting. Endocrine: Negative for cold intolerance and heat intolerance. Genitourinary: Negative for difficulty urinating, dysuria, flank pain, hematuria, testicular pain and urgency. Musculoskeletal: Negative for arthralgias, back pain, gait problem, joint swelling, myalgias, neck pain and neck stiffness. Skin: Negative for color change, pallor, rash and wound. Allergic/Immunologic: Negative for immunocompromised state. Neurological: Negative for dizziness, tremors, syncope, weakness, light-headedness, numbness and headaches. Hematological: Does not bruise/bleed easily. Psychiatric/Behavioral: Negative for agitation, confusion and suicidal ideas. Physical Exam   Physical Exam   Constitutional: He is oriented to person, place, and time. He appears well-developed and well-nourished. No distress. HENT:   Head: Atraumatic. Nose: Nose normal.   Mouth/Throat: No oropharyngeal exudate. Eyes: Conjunctivae and EOM are normal. Right eye exhibits no discharge. Left eye exhibits no discharge. No scleral icterus. Neck: Normal range of motion. Neck supple. No JVD present. No tracheal deviation present. No thyromegaly present. Cardiovascular: Normal rate and regular rhythm. Exam reveals no gallop and no friction rub. No murmur heard. Pulmonary/Chest: Breath sounds normal. No stridor. No respiratory distress. He has no wheezes. He has no rales. He exhibits no tenderness. Abdominal: Soft. Bowel sounds are normal. He exhibits no distension and no mass. There is no tenderness. There is no rebound and no guarding. Musculoskeletal: Normal range of motion. He exhibits no edema or tenderness. Lymphadenopathy:     He has no cervical adenopathy. Neurological: He is alert and oriented to person, place, and time. Coordination normal.   Skin: Skin is warm and dry. He is not diaphoretic.    Psychiatric: He has a normal mood and affect. His behavior is normal.   Nursing note and vitals reviewed. Diagnostic Study Results     Labs -     Recent Results (from the past 12 hour(s))   EKG, 12 LEAD, INITIAL    Collection Time: 12/13/18 11:12 AM   Result Value Ref Range    Ventricular Rate 81 BPM    Atrial Rate 81 BPM    P-R Interval 182 ms    QRS Duration 100 ms    Q-T Interval 360 ms    QTC Calculation (Bezet) 418 ms    Calculated P Axis -11 degrees    Calculated R Axis 7 degrees    Calculated T Axis 79 degrees    Diagnosis       Sinus rhythm with marked sinus arrhythmia with occasional premature   ventricular complexes  Nonspecific ST and T wave abnormality  When compared with ECG of 21-APR-2016 17:36,  premature atrial complexes are no longer present  Nonspecific T wave abnormality now evident in Inferior leads  Confirmed by Barrie Carter P.CHRISTELLE ([id number]) on 12/13/2018 18:29:46 PM     METABOLIC PANEL, COMPREHENSIVE    Collection Time: 12/13/18 11:31 AM   Result Value Ref Range    Sodium 136 136 - 145 mmol/L    Potassium 3.6 3.5 - 5.1 mmol/L    Chloride 106 97 - 108 mmol/L    CO2 22 21 - 32 mmol/L    Anion gap 8 5 - 15 mmol/L    Glucose 110 (H) 65 - 100 mg/dL    BUN 22 (H) 6 - 20 MG/DL    Creatinine 1.30 0.70 - 1.30 MG/DL    BUN/Creatinine ratio 17 12 - 20      GFR est AA >60 >60 ml/min/1.73m2    GFR est non-AA 52 (L) >60 ml/min/1.73m2    Calcium 9.8 8.5 - 10.1 MG/DL    Bilirubin, total 2.4 (H) 0.2 - 1.0 MG/DL    ALT (SGPT) 13 12 - 78 U/L    AST (SGOT) 20 15 - 37 U/L    Alk.  phosphatase 100 45 - 117 U/L    Protein, total 8.1 6.4 - 8.2 g/dL    Albumin 2.9 (L) 3.5 - 5.0 g/dL    Globulin 5.2 (H) 2.0 - 4.0 g/dL    A-G Ratio 0.6 (L) 1.1 - 2.2     CBC WITH AUTOMATED DIFF    Collection Time: 12/13/18 11:31 AM   Result Value Ref Range    WBC 4.9 4.1 - 11.1 K/uL    RBC 2.93 (L) 4.10 - 5.70 M/uL    HGB 10.1 (L) 12.1 - 17.0 g/dL    HCT 31.0 (L) 36.6 - 50.3 %    .8 (H) 80.0 - 99.0 FL    MCH 34.5 (H) 26.0 - 34.0 PG    MCHC 32.6 30.0 - 36.5 g/dL RDW 13.2 11.5 - 14.5 %    PLATELET 425 502 - 746 K/uL    MPV 10.9 8.9 - 12.9 FL    NRBC 0.0 0  WBC    ABSOLUTE NRBC 0.00 0.00 - 0.01 K/uL    NEUTROPHILS 62 32 - 75 %    LYMPHOCYTES 26 12 - 49 %    MONOCYTES 10 5 - 13 %    EOSINOPHILS 0 0 - 7 %    BASOPHILS 0 0 - 1 %    IMMATURE GRANULOCYTES 1 (H) 0.0 - 0.5 %    ABS. NEUTROPHILS 3.0 1.8 - 8.0 K/UL    ABS. LYMPHOCYTES 1.3 0.8 - 3.5 K/UL    ABS. MONOCYTES 0.5 0.0 - 1.0 K/UL    ABS. EOSINOPHILS 0.0 0.0 - 0.4 K/UL    ABS. BASOPHILS 0.0 0.0 - 0.1 K/UL    ABS. IMM. GRANS. 0.1 (H) 0.00 - 0.04 K/UL    DF AUTOMATED     TROPONIN I    Collection Time: 12/13/18 11:31 AM   Result Value Ref Range    Troponin-I, Qt. <0.05 <0.05 ng/mL   CK    Collection Time: 12/13/18 11:31 AM   Result Value Ref Range    CK 43 39 - 308 U/L   URINALYSIS W/MICROSCOPIC    Collection Time: 12/13/18  1:18 PM   Result Value Ref Range    Color DARK YELLOW      Appearance CLEAR CLEAR      Specific gravity 1.021 1.003 - 1.030      pH (UA) 6.0 5.0 - 8.0      Protein 30 (A) NEG mg/dL    Glucose NEGATIVE  NEG mg/dL    Ketone TRACE (A) NEG mg/dL    Blood NEGATIVE  NEG      Urobilinogen >8.0 (H) 0.2 - 1.0 EU/dL    Nitrites NEGATIVE  NEG      Leukocyte Esterase NEGATIVE  NEG      WBC 0-4 0 - 4 /hpf    RBC 0-5 0 - 5 /hpf    Epithelial cells FEW FEW /lpf    Bacteria NEGATIVE  NEG /hpf    Hyaline cast 0-2 0 - 5 /lpf   BILIRUBIN, CONFIRM    Collection Time: 12/13/18  1:18 PM   Result Value Ref Range    Bilirubin UA, confirm NEGATIVE  NEG         Radiologic Studies -   US ABD LTD   Final Result   IMPRESSION:   1. Small stones within the gallbladder which are mobile. No ductal dilatation or   wall thickening         XR SPINE THORAC 3 V   Final Result   IMPRESSION:   1. No acute abnormality         XR HIPS BI W AP PELV   Final Result   IMPRESSION: No acute abnormality      XR SPINE LUMB 2 OR 3 V   Final Result   IMPRESSION:   1. Scoliosis and degenerative change.  No acute fracture         XR CHEST PA LAT Final Result   IMPRESSION:   1. Prominence of the mediastinum and cardiac silhouette however this is   accentuated by supine radiography. When patient can tolerate recommend repeat PA   and lateral in upright position with good inspiration         XR SHOULDER LT AP/LAT MIN 2 V   Final Result   IMPRESSION: Osteopenia with degenerative changes of the glenohumeral joint. No   acute fracture. CT SPINE CERV WO CONT   Final Result   IMPRESSION: No fracture. Cervical degenerative disc disease. CT HEAD WO CONT   Final Result   IMPRESSION: No acute changes. CT Results  (Last 48 hours)               12/13/18 1147  CT HEAD WO CONT Final result    Impression:  IMPRESSION: No acute changes. Narrative:  EXAM: CT HEAD WO CONT       INDICATION: fall       COMPARISON: October 10. CONTRAST: None. TECHNIQUE: Unenhanced CT of the head was performed using 5 mm images. Brain and   bone windows were generated. CT dose reduction was achieved through use of a   standardized protocol tailored for this examination and automatic exposure   control for dose modulation. FINDINGS:   Diffuse atrophy and nonspecific white matter changes. There is no extra-axial   fluid collection hemorrhage shift or masses . 12/13/18 1147  CT SPINE CERV WO CONT Final result    Impression:  IMPRESSION: No fracture. Cervical degenerative disc disease. Narrative:  INDICATION:  Neck pain,  fall        EXAM: Axial unenhanced CT of the cervical spine is performed with 2D coronal and   sagittal reformatted images provided. CT dose reduction was achieved through use   of a standardized protocol tailored for this examination and automatic exposure   control for dose modulation. There is no fracture or significant subluxation. There is degenerative disc   disease at multiple levels, most severe at C6-7. There is multilevel facet   arthritis. . There is no prevertebral soft tissue swelling.  Visualized thyroid   and neck soft tissues are unremarkable for age. CXR Results  (Last 48 hours)               12/13/18 1308  XR CHEST PA LAT Final result    Impression:  IMPRESSION:   1. Prominence of the mediastinum and cardiac silhouette however this is   accentuated by supine radiography. When patient can tolerate recommend repeat PA   and lateral in upright position with good inspiration           Narrative:  INDICATION:  fall        EXAM: Chest 2 views were obtained. The AP view was obtained supine. Comparison   4/21/2016       FINDINGS: Cardiac silhouette is upper limits of normal. Mediastinum is prominent   however this is eccentrically to by the supine radiograph. Lung volumes are   decreased without pneumothorax or focal consolidation. No pleural effusion                   Medical Decision Making   I am the first provider for this patient. I reviewed the vital signs, available nursing notes, past medical history, past surgical history, family history and social history. Vital Signs-Reviewed the patient's vital signs. Patient Vitals for the past 12 hrs:   Temp Pulse Resp BP SpO2   12/13/18 1325  89 20 168/80 94 %   12/13/18 1102 97.3 °F (36.3 °C) 74 16 114/79 99 %       Pulse Oximetry Analysis - 99% on RA    Cardiac Monitor:   Rate: 74 bpm  Rhythm: Normal Sinus Rhythm      EKG interpretation: (Preliminary)  Rhythm: normal sinus rhythm; and regular . Rate (approx.): 81; Axis: normal; IN interval: normal; QRS interval: normal ; ST/T wave: non-specific changes; Other findings: abnormal ekg. Records Reviewed: Nursing Notes, Old Medical Records, Previous Radiology Studies and Previous Laboratory Studies    Provider Notes (Medical Decision Making):   Recurrent falls    Xr series  CT head and neck  Routine laboratory data and UA  Consult to PT      ED Course:   Initial assessment performed.  The patients presenting problems have been discussed, and they are in agreement with the care plan formulated and outlined with them. I have encouraged them to ask questions as they arise throughout their visit. Stable ambulatory pt in [facility]    Critical Care Time:   0    Disposition:  Discharge to home with PCP follow up and in home PT    PLAN:  1. Current Discharge Medication List        2. Follow-up Information     Follow up With Specialties Details Why Contact Info    Leodan Cassidy MD Pediatrics, Internal Medicine In 1 day  [address]  [address] [place] [address],[address]  [facility].  [phone]      [facility] EMERGENCY DEPT Emergency Medicine  As needed, If symptoms worsen [address] [facility]  [address] [facility]  [phone]        Return to ED if worse     Diagnosis     Clinical Impression:   1.  Fall, initial encounter        Attestations:    Vola Hamman, NP  3:19 PM

## 2018-12-13 NOTE — DISCHARGE INSTRUCTIONS
Preventing Falls: Care Instructions  Your Care Instructions    Getting around your home safely can be a challenge if you have injuries or health problems that make it easy for you to fall. Loose rugs and furniture in walkways are among the dangers for many older people who have problems walking or who have poor eyesight. People who have conditions such as arthritis, osteoporosis, or dementia also have to be careful not to fall. You can make your home safer with a few simple measures. Follow-up care is a key part of your treatment and safety. Be sure to make and go to all appointments, and call your doctor if you are having problems. It's also a good idea to know your test results and keep a list of the medicines you take. How can you care for yourself at home? Taking care of yourself  · You may get dizzy if you do not drink enough water. To prevent dehydration, drink plenty of fluids, enough so that your urine is light yellow or clear like water. Choose water and other caffeine-free clear liquids. If you have kidney, heart, or liver disease and have to limit fluids, talk with your doctor before you increase the amount of fluids you drink. · Exercise regularly to improve your strength, muscle tone, and balance. Walk if you can. Swimming may be a good choice if you cannot walk easily. · Have your vision and hearing checked each year or any time you notice a change. If you have trouble seeing and hearing, you might not be able to avoid objects and could lose your balance. · Know the side effects of the medicines you take. Ask your doctor or pharmacist whether the medicines you take can affect your balance. Sleeping pills or sedatives can affect your balance. · Limit the amount of alcohol you drink. Alcohol can impair your balance and other senses. · Ask your doctor whether calluses or corns on your feet need to be removed.  If you wear loose-fitting shoes because of calluses or corns, you can lose your balance and fall. · Talk to your doctor if you have numbness in your feet. Preventing falls at home  · Remove raised doorway thresholds, throw rugs, and clutter. Repair loose carpet or raised areas in the floor. · Move furniture and electrical cords to keep them out of walking paths. · Use nonskid floor wax, and wipe up spills right away, especially on ceramic tile floors. · If you use a walker or cane, put rubber tips on it. If you use crutches, clean the bottoms of them regularly with an abrasive pad, such as steel wool. · Keep your house well lit, especially [facility], and outside walkways. Use night-lights in areas such as hallways and bathrooms. Add extra light switches or use remote switches (such as switches that go on or off when you clap your hands) to make it easier to turn lights on if you have to get up during the night. · Install sturdy handrails on stairways. · Move items in your cabinets so that the things you use a lot are on the lower shelves (about waist level). · Keep a cordless phone and a flashlight with new batteries by your bed. If possible, put a phone in each of the main rooms of your house, or carry a cell phone in case you fall and cannot reach a phone. Or, you can wear a device around your neck or wrist. You push a button that sends a signal for help. · Wear low-heeled shoes that fit well and give your feet good support. Use footwear with nonskid soles. Check the heels and soles of your shoes for wear. Repair or replace worn heels or soles. · Do not wear socks without shoes on wood floors. · Walk on the grass when the sidewalks are slippery. If you live in an area that gets snow and ice in the winter, sprinkle salt on slippery steps and sidewalks. Preventing falls in the bath  · Install grab bars and nonskid mats inside and outside your shower or tub and near the toilet and sinks. · Use shower chairs and bath benches.   · Use a hand-held shower head that will allow you to sit while showering. · Get into a tub or shower by putting the weaker leg in first. Get out of a tub or shower with your strong side first.  · Repair loose toilet seats and consider installing a raised toilet seat to make getting on and off the toilet easier. · Keep your bathroom door unlocked while you are in the shower. Where can you learn more? Go to http://ya-keara.info/. Enter [phone] in the search box to learn more about \"Preventing Falls: Care Instructions. \"  Current as of: March 16, 2018  Content Version: 11.8  © 9961-0630 [employer]. Care instructions adapted under license by [employer] (which disclaims liability or warranty for this information). If you have questions about a medical condition or this instruction, always ask your healthcare professional. [place] [address] any warranty or liability for your use of this information. We hope that we have addressed all of your medical concerns. The examination and treatment you received in the Emergency Department were for an emergent problem and were not intended as complete care. It is important that you follow up with your healthcare provider(s) for ongoing care. If your symptoms worsen or do not improve as expected, and you are unable to reach your usual health care provider(s), you should return to the Emergency Department. [facility] participate in nationally recognized quality of care measures. If your blood pressure is greater than 120/80, as reported below, we urge that you seek medical care to address the potential of high blood pressure, commonly known as hypertension. Hypertension can be hereditary or can be caused by certain medical conditions, pain, stress, or \"white coat syndrome. \"       Please make an appointment with your health care provider(s) for follow up of your Emergency Department visit.        VITALS:   Patient Vitals for the past 8 hrs:   Temp Pulse Resp BP SpO2   12/13/18 1325 -- 89 20 168/80 94 %   12/13/18 1102 97.3 °F (36.3 °C) 74 16 114/79 99 %          Thank you for allowing us to provide you with medical care today. We realize that you have many choices for your emergency care needs. Please choose us in the future for any continued health care needs. Chantell Byrd NP        Recent Results (from the past 24 hour(s))   EKG, 12 LEAD, INITIAL    Collection Time: 12/13/18 11:12 AM   Result Value Ref Range    Ventricular Rate 81 BPM    Atrial Rate 81 BPM    P-R Interval 182 ms    QRS Duration 100 ms    Q-T Interval 360 ms    QTC Calculation (Bezet) 418 ms    Calculated P Axis -11 degrees    Calculated R Axis 7 degrees    Calculated T Axis 79 degrees    Diagnosis       Sinus rhythm with marked sinus arrhythmia with occasional premature   ventricular complexes  Nonspecific ST and T wave abnormality  When compared with ECG of 21-APR-2016 17:36,  premature atrial complexes are no longer present  Nonspecific T wave abnormality now evident in Inferior leads  Confirmed by Marva Xiong, P.V. ([id number]) on 12/13/2018 51:52:91 PM     METABOLIC PANEL, COMPREHENSIVE    Collection Time: 12/13/18 11:31 AM   Result Value Ref Range    Sodium 136 136 - 145 mmol/L    Potassium 3.6 3.5 - 5.1 mmol/L    Chloride 106 97 - 108 mmol/L    CO2 22 21 - 32 mmol/L    Anion gap 8 5 - 15 mmol/L    Glucose 110 (H) 65 - 100 mg/dL    BUN 22 (H) 6 - 20 MG/DL    Creatinine 1.30 0.70 - 1.30 MG/DL    BUN/Creatinine ratio 17 12 - 20      GFR est AA >60 >60 ml/min/1.73m2    GFR est non-AA 52 (L) >60 ml/min/1.73m2    Calcium 9.8 8.5 - 10.1 MG/DL    Bilirubin, total 2.4 (H) 0.2 - 1.0 MG/DL    ALT (SGPT) 13 12 - 78 U/L    AST (SGOT) 20 15 - 37 U/L    Alk.  phosphatase 100 45 - 117 U/L    Protein, total 8.1 6.4 - 8.2 g/dL    Albumin 2.9 (L) 3.5 - 5.0 g/dL    Globulin 5.2 (H) 2.0 - 4.0 g/dL    A-G Ratio 0.6 (L) 1.1 - 2.2     CBC WITH AUTOMATED DIFF Collection Time: 12/13/18 11:31 AM   Result Value Ref Range    WBC 4.9 4.1 - 11.1 K/uL    RBC 2.93 (L) 4.10 - 5.70 M/uL    HGB 10.1 (L) 12.1 - 17.0 g/dL    HCT 31.0 (L) 36.6 - 50.3 %    .8 (H) 80.0 - 99.0 FL    MCH 34.5 (H) 26.0 - 34.0 PG    MCHC 32.6 30.0 - 36.5 g/dL    RDW 13.2 11.5 - 14.5 %    PLATELET 490 772 - 524 K/uL    MPV 10.9 8.9 - 12.9 FL    NRBC 0.0 0  WBC    ABSOLUTE NRBC 0.00 0.00 - 0.01 K/uL    NEUTROPHILS 62 32 - 75 %    LYMPHOCYTES 26 12 - 49 %    MONOCYTES 10 5 - 13 %    EOSINOPHILS 0 0 - 7 %    BASOPHILS 0 0 - 1 %    IMMATURE GRANULOCYTES 1 (H) 0.0 - 0.5 %    ABS. NEUTROPHILS 3.0 1.8 - 8.0 K/UL    ABS. LYMPHOCYTES 1.3 0.8 - 3.5 K/UL    ABS. MONOCYTES 0.5 0.0 - 1.0 K/UL    ABS. EOSINOPHILS 0.0 0.0 - 0.4 K/UL    ABS. BASOPHILS 0.0 0.0 - 0.1 K/UL    ABS. IMM. GRANS. 0.1 (H) 0.00 - 0.04 K/UL    DF AUTOMATED     TROPONIN I    Collection Time: 12/13/18 11:31 AM   Result Value Ref Range    Troponin-I, Qt. <0.05 <0.05 ng/mL   CK    Collection Time: 12/13/18 11:31 AM   Result Value Ref Range    CK 43 39 - 308 U/L   URINALYSIS W/MICROSCOPIC    Collection Time: 12/13/18  1:18 PM   Result Value Ref Range    Color DARK YELLOW      Appearance CLEAR CLEAR      Specific gravity 1.021 1.003 - 1.030      pH (UA) 6.0 5.0 - 8.0      Protein 30 (A) NEG mg/dL    Glucose NEGATIVE  NEG mg/dL    Ketone TRACE (A) NEG mg/dL    Blood NEGATIVE  NEG      Urobilinogen >8.0 (H) 0.2 - 1.0 EU/dL    Nitrites NEGATIVE  NEG      Leukocyte Esterase NEGATIVE  NEG      WBC 0-4 0 - 4 /hpf    RBC 0-5 0 - 5 /hpf    Epithelial cells FEW FEW /lpf    Bacteria NEGATIVE  NEG /hpf    Hyaline cast 0-2 0 - 5 /lpf   BILIRUBIN, CONFIRM    Collection Time: 12/13/18  1:18 PM   Result Value Ref Range    Bilirubin UA, confirm NEGATIVE  NEG         Xr Chest Pa Lat    Result Date: 12/13/2018  INDICATION:  fall EXAM: Chest 2 views were obtained. The AP view was obtained supine.  Comparison 4/21/2016 FINDINGS: Cardiac silhouette is upper limits of normal. Mediastinum is prominent however this is eccentrically to by the supine radiograph. Lung volumes are decreased without pneumothorax or focal consolidation. No pleural effusion     IMPRESSION: 1. Prominence of the mediastinum and cardiac silhouette however this is accentuated by supine radiography. When patient can tolerate recommend repeat PA and lateral in upright position with good inspiration     Xr Spine Thorac 3 V    Result Date: 12/13/2018  INDICATION:  fall mid back pain EXAM: 3 views thoracic spine. Comparison: None FINDINGS: Alignment is normal. Bone mineral density is decreased. There is multilevel degenerative change. . No significant degeneration. IMPRESSION: 1. No acute abnormality     Xr Spine Lumb 2 Or 3 V    Result Date: 12/13/2018  INDICATION:  fall lower back pain EXAM: 3 views lumbar spine. No comparisons. FINDINGS: There is a levoconvex scoliosis of the lumbar spine. Slight retrolisthesis of L2 on L3. Multilevel degenerative change. Aorta is incompletely calcified. No fracture. IMPRESSION: 1. Scoliosis and degenerative change. No acute fracture     Xr Shoulder Lt Ap/lat Min 2 V    Result Date: 12/13/2018  EXAM: XR SHOULDER LT AP/LAT MIN 2 V INDICATION: fall. Left shoulder pain COMPARISON: None. FINDINGS: Three views of the left shoulder demonstrate severe degeneration of the glenohumeral joint. Bones are osteopenic. No fracture. Patient could not tolerate axillary imaging. IMPRESSION: Osteopenia with degenerative changes of the glenohumeral joint. No acute fracture. Ct Head Wo Cont    Result Date: 12/13/2018  EXAM: CT HEAD WO CONT INDICATION: fall COMPARISON: October 10. CONTRAST: None. TECHNIQUE: Unenhanced CT of the head was performed using 5 mm images. Brain and bone windows were generated. CT dose reduction was achieved through use of a standardized protocol tailored for this examination and automatic exposure control for dose modulation.   FINDINGS: Diffuse atrophy and nonspecific white matter changes. There is no extra-axial fluid collection hemorrhage shift or masses . IMPRESSION: No acute changes. Ct Spine Cerv Wo Cont    Result Date: 12/13/2018  INDICATION:  Neck pain,  fall EXAM: Axial unenhanced CT of the cervical spine is performed with 2D coronal and sagittal reformatted images provided. CT dose reduction was achieved through use of a standardized protocol tailored for this examination and automatic exposure control for dose modulation. There is no fracture or significant subluxation. There is degenerative disc disease at multiple levels, most severe at C6-7. There is multilevel facet arthritis. . There is no prevertebral soft tissue swelling. Visualized thyroid and neck soft tissues are unremarkable for age. IMPRESSION: No fracture. Cervical degenerative disc disease. [id number]    Result Date: 12/13/2018  INDICATION: gallbladder elevated total bilirubin EXAM: Limited right upper quadrant abdominal ultrasound. 8/26/2014. FINDINGS: There are no focal hepatic lesions. No intra or extrahepatic biliary ductal dilatation. Common duct measures 5 mm. Multiple mobile stones within the gallbladder. No wall thickening. There is tenderness in the right upper quadrant. No pericholecystic fluid. Pancreatic head is not enlarged. The right kidney measures 10cm. No hydronephrosis. Visualization is limited but there may be a nonobstructing stone. Portal vein is patent with appropriate direction of flow. .     IMPRESSION: 1. Small stones within the gallbladder which are mobile. No ductal dilatation or wall thickening     Xr Hips Bi W Ap Pelv    Result Date: 12/13/2018  EXAM: XR HIPS BI W AP PELV INDICATION: fall. Bilateral hip pain COMPARISON: August 11, 2009. FINDINGS: An AP view of the pelvis and frogleg lateral views of both hips demonstrate interval placement of a right sided hemiarthroplasty. Bones are osteopenic. Alignment is normal. No fracture. Renetta Copper      IMPRESSION: No acute abnormality

## 2019-01-01 ENCOUNTER — HOSPITAL ENCOUNTER (EMERGENCY)
Age: 84
Discharge: HOME | End: 2019-05-14
Attending: EMERGENCY MEDICINE | Admitting: EMERGENCY MEDICINE
Payer: MEDICARE

## 2019-01-01 ENCOUNTER — HOME CARE VISIT (OUTPATIENT)
Dept: HOSPICE | Facility: HOSPICE | Age: 84
End: 2019-01-01
Payer: MEDICARE

## 2019-01-01 ENCOUNTER — HOME CARE VISIT (OUTPATIENT)
Dept: SCHEDULING | Facility: HOME HEALTH | Age: 84
End: 2019-01-01
Payer: MEDICARE

## 2019-01-01 ENCOUNTER — HOSPITAL ENCOUNTER (INPATIENT)
Age: 84
LOS: 7 days | Discharge: NURSING, REHAB OR LONG-TERM CARE | DRG: 228 | End: 2019-03-01
Attending: EMERGENCY MEDICINE | Admitting: HOSPITALIST
Payer: MEDICARE

## 2019-01-01 ENCOUNTER — APPOINTMENT (OUTPATIENT)
Dept: CT IMAGING | Age: 84
DRG: 228 | End: 2019-01-01
Attending: EMERGENCY MEDICINE
Payer: MEDICARE

## 2019-01-01 ENCOUNTER — PATIENT OUTREACH (OUTPATIENT)
Dept: CASE MANAGEMENT | Age: 84
End: 2019-01-01

## 2019-01-01 ENCOUNTER — APPOINTMENT (OUTPATIENT)
Dept: CT IMAGING | Age: 84
DRG: 228 | End: 2019-01-01
Attending: HOSPITALIST
Payer: MEDICARE

## 2019-01-01 ENCOUNTER — HOSPITAL ENCOUNTER (OUTPATIENT)
Dept: REHABILITATION | Age: 84
End: 2019-03-14
Attending: PHYSICAL MEDICINE & REHABILITATION | Admitting: PHYSICAL MEDICINE & REHABILITATION

## 2019-01-01 ENCOUNTER — OFFICE VISIT (OUTPATIENT)
Dept: CARDIOLOGY CLINIC | Age: 84
End: 2019-01-01

## 2019-01-01 ENCOUNTER — HOSPICE ADMISSION (OUTPATIENT)
Dept: HOSPICE | Facility: HOSPICE | Age: 84
End: 2019-01-01
Payer: MEDICARE

## 2019-01-01 ENCOUNTER — ANESTHESIA EVENT (OUTPATIENT)
Dept: CARDIAC CATH/INVASIVE PROCEDURES | Age: 84
DRG: 228 | End: 2019-01-01
Payer: MEDICARE

## 2019-01-01 ENCOUNTER — FACE TO FACE ENCOUNTER (OUTPATIENT)
Dept: FAMILY MEDICINE CLINIC | Age: 84
End: 2019-01-01

## 2019-01-01 ENCOUNTER — APPOINTMENT (OUTPATIENT)
Dept: NON INVASIVE DIAGNOSTICS | Age: 84
DRG: 291 | End: 2019-01-01
Attending: INTERNAL MEDICINE
Payer: MEDICARE

## 2019-01-01 ENCOUNTER — APPOINTMENT (OUTPATIENT)
Dept: CT IMAGING | Age: 84
End: 2019-01-01
Attending: EMERGENCY MEDICINE
Payer: MEDICARE

## 2019-01-01 ENCOUNTER — DOCUMENTATION ONLY (OUTPATIENT)
Dept: FAMILY MEDICINE CLINIC | Age: 84
End: 2019-01-01

## 2019-01-01 ENCOUNTER — APPOINTMENT (OUTPATIENT)
Dept: GENERAL RADIOLOGY | Age: 84
End: 2019-01-01
Attending: EMERGENCY MEDICINE
Payer: MEDICARE

## 2019-01-01 ENCOUNTER — APPOINTMENT (OUTPATIENT)
Dept: CT IMAGING | Age: 84
DRG: 291 | End: 2019-01-01
Attending: EMERGENCY MEDICINE
Payer: MEDICARE

## 2019-01-01 ENCOUNTER — APPOINTMENT (OUTPATIENT)
Dept: NON INVASIVE DIAGNOSTICS | Age: 84
DRG: 228 | End: 2019-01-01
Attending: HOSPITALIST
Payer: MEDICARE

## 2019-01-01 ENCOUNTER — APPOINTMENT (OUTPATIENT)
Dept: GENERAL RADIOLOGY | Age: 84
DRG: 291 | End: 2019-01-01
Attending: EMERGENCY MEDICINE
Payer: MEDICARE

## 2019-01-01 ENCOUNTER — ANESTHESIA (OUTPATIENT)
Dept: CARDIAC CATH/INVASIVE PROCEDURES | Age: 84
DRG: 228 | End: 2019-01-01
Payer: MEDICARE

## 2019-01-01 ENCOUNTER — APPOINTMENT (OUTPATIENT)
Dept: GENERAL RADIOLOGY | Age: 84
DRG: 228 | End: 2019-01-01
Attending: EMERGENCY MEDICINE
Payer: MEDICARE

## 2019-01-01 ENCOUNTER — HOSPITAL ENCOUNTER (INPATIENT)
Age: 84
LOS: 3 days | Discharge: NURSING, REHAB OR LONG-TERM CARE | DRG: 291 | End: 2019-06-26
Attending: EMERGENCY MEDICINE | Admitting: INTERNAL MEDICINE
Payer: MEDICARE

## 2019-01-01 ENCOUNTER — APPOINTMENT (OUTPATIENT)
Dept: NON INVASIVE DIAGNOSTICS | Age: 84
DRG: 291 | End: 2019-01-01
Attending: EMERGENCY MEDICINE
Payer: MEDICARE

## 2019-01-01 VITALS
DIASTOLIC BLOOD PRESSURE: 66 MMHG | OXYGEN SATURATION: 93 % | HEART RATE: 73 BPM | RESPIRATION RATE: 16 BRPM | SYSTOLIC BLOOD PRESSURE: 108 MMHG

## 2019-01-01 VITALS
OXYGEN SATURATION: 98 % | DIASTOLIC BLOOD PRESSURE: 68 MMHG | RESPIRATION RATE: 16 BRPM | TEMPERATURE: 98.4 F | DIASTOLIC BLOOD PRESSURE: 56 MMHG | SYSTOLIC BLOOD PRESSURE: 104 MMHG | OXYGEN SATURATION: 94 % | SYSTOLIC BLOOD PRESSURE: 108 MMHG | HEART RATE: 70 BPM | HEART RATE: 72 BPM | TEMPERATURE: 97.9 F | RESPIRATION RATE: 15 BRPM

## 2019-01-01 VITALS
SYSTOLIC BLOOD PRESSURE: 118 MMHG | DIASTOLIC BLOOD PRESSURE: 81 MMHG | RESPIRATION RATE: 15 BRPM | OXYGEN SATURATION: 97 % | HEART RATE: 75 BPM | TEMPERATURE: 98 F

## 2019-01-01 VITALS
RESPIRATION RATE: 17 BRPM | OXYGEN SATURATION: 94 % | DIASTOLIC BLOOD PRESSURE: 58 MMHG | TEMPERATURE: 98.4 F | SYSTOLIC BLOOD PRESSURE: 88 MMHG | HEART RATE: 80 BPM

## 2019-01-01 VITALS — DIASTOLIC BLOOD PRESSURE: 58 MMHG | HEART RATE: 72 BPM | SYSTOLIC BLOOD PRESSURE: 104 MMHG

## 2019-01-01 VITALS — HEART RATE: 71 BPM | RESPIRATION RATE: 16 BRPM | SYSTOLIC BLOOD PRESSURE: 106 MMHG | DIASTOLIC BLOOD PRESSURE: 62 MMHG

## 2019-01-01 VITALS — DIASTOLIC BLOOD PRESSURE: 52 MMHG | HEART RATE: 42 BPM | SYSTOLIC BLOOD PRESSURE: 96 MMHG | RESPIRATION RATE: 16 BRPM

## 2019-01-01 VITALS — DIASTOLIC BLOOD PRESSURE: 78 MMHG | RESPIRATION RATE: 18 BRPM | HEART RATE: 60 BPM | SYSTOLIC BLOOD PRESSURE: 120 MMHG

## 2019-01-01 VITALS
DIASTOLIC BLOOD PRESSURE: 68 MMHG | HEIGHT: 74 IN | WEIGHT: 205 LBS | HEART RATE: 91 BPM | TEMPERATURE: 97.9 F | OXYGEN SATURATION: 94 % | BODY MASS INDEX: 26.31 KG/M2 | RESPIRATION RATE: 16 BRPM | SYSTOLIC BLOOD PRESSURE: 94 MMHG

## 2019-01-01 VITALS
OXYGEN SATURATION: 91 % | RESPIRATION RATE: 18 BRPM | HEART RATE: 88 BPM | TEMPERATURE: 97.6 F | SYSTOLIC BLOOD PRESSURE: 102 MMHG | DIASTOLIC BLOOD PRESSURE: 79 MMHG

## 2019-01-01 VITALS
SYSTOLIC BLOOD PRESSURE: 120 MMHG | RESPIRATION RATE: 16 BRPM | BODY MASS INDEX: 28.32 KG/M2 | DIASTOLIC BLOOD PRESSURE: 71 MMHG | WEIGHT: 220.68 LBS | OXYGEN SATURATION: 96 % | HEART RATE: 93 BPM | TEMPERATURE: 97.3 F | HEIGHT: 74 IN

## 2019-01-01 VITALS
TEMPERATURE: 98.2 F | HEART RATE: 73 BPM | RESPIRATION RATE: 15 BRPM | SYSTOLIC BLOOD PRESSURE: 106 MMHG | DIASTOLIC BLOOD PRESSURE: 71 MMHG | OXYGEN SATURATION: 94 %

## 2019-01-01 VITALS — DIASTOLIC BLOOD PRESSURE: 64 MMHG | HEART RATE: 75 BPM | OXYGEN SATURATION: 93 % | SYSTOLIC BLOOD PRESSURE: 108 MMHG

## 2019-01-01 VITALS
HEART RATE: 59 BPM | OXYGEN SATURATION: 93 % | SYSTOLIC BLOOD PRESSURE: 117 MMHG | OXYGEN SATURATION: 98 % | DIASTOLIC BLOOD PRESSURE: 62 MMHG | SYSTOLIC BLOOD PRESSURE: 90 MMHG | DIASTOLIC BLOOD PRESSURE: 70 MMHG | TEMPERATURE: 97.2 F | RESPIRATION RATE: 13 BRPM | RESPIRATION RATE: 23 BRPM | HEART RATE: 70 BPM

## 2019-01-01 VITALS — RESPIRATION RATE: 18 BRPM | DIASTOLIC BLOOD PRESSURE: 78 MMHG | HEART RATE: 85 BPM | SYSTOLIC BLOOD PRESSURE: 120 MMHG

## 2019-01-01 VITALS
RESPIRATION RATE: 10 BRPM | OXYGEN SATURATION: 91 % | SYSTOLIC BLOOD PRESSURE: 100 MMHG | HEART RATE: 58 BPM | DIASTOLIC BLOOD PRESSURE: 60 MMHG

## 2019-01-01 VITALS
OXYGEN SATURATION: 96 % | HEART RATE: 73 BPM | RESPIRATION RATE: 22 BRPM | DIASTOLIC BLOOD PRESSURE: 64 MMHG | SYSTOLIC BLOOD PRESSURE: 100 MMHG

## 2019-01-01 VITALS
HEART RATE: 73 BPM | RESPIRATION RATE: 14 BRPM | TEMPERATURE: 98.2 F | OXYGEN SATURATION: 87 % | SYSTOLIC BLOOD PRESSURE: 106 MMHG | DIASTOLIC BLOOD PRESSURE: 60 MMHG

## 2019-01-01 VITALS
HEART RATE: 71 BPM | RESPIRATION RATE: 18 BRPM | OXYGEN SATURATION: 93 % | TEMPERATURE: 98.5 F | DIASTOLIC BLOOD PRESSURE: 86 MMHG | SYSTOLIC BLOOD PRESSURE: 128 MMHG

## 2019-01-01 VITALS
SYSTOLIC BLOOD PRESSURE: 98 MMHG | TEMPERATURE: 98 F | RESPIRATION RATE: 13 BRPM | TEMPERATURE: 98.6 F | DIASTOLIC BLOOD PRESSURE: 61 MMHG | OXYGEN SATURATION: 95 % | SYSTOLIC BLOOD PRESSURE: 95 MMHG | HEART RATE: 65 BPM | HEART RATE: 88 BPM | OXYGEN SATURATION: 96 % | DIASTOLIC BLOOD PRESSURE: 61 MMHG | RESPIRATION RATE: 13 BRPM

## 2019-01-01 VITALS
RESPIRATION RATE: 12 BRPM | TEMPERATURE: 97.9 F | HEART RATE: 61 BPM | OXYGEN SATURATION: 96 % | SYSTOLIC BLOOD PRESSURE: 118 MMHG | DIASTOLIC BLOOD PRESSURE: 78 MMHG

## 2019-01-01 VITALS
HEART RATE: 70 BPM | DIASTOLIC BLOOD PRESSURE: 80 MMHG | SYSTOLIC BLOOD PRESSURE: 108 MMHG | RESPIRATION RATE: 22 BRPM | DIASTOLIC BLOOD PRESSURE: 52 MMHG | HEART RATE: 73 BPM | OXYGEN SATURATION: 93 % | RESPIRATION RATE: 14 BRPM | SYSTOLIC BLOOD PRESSURE: 138 MMHG

## 2019-01-01 VITALS — SYSTOLIC BLOOD PRESSURE: 108 MMHG | RESPIRATION RATE: 14 BRPM | HEART RATE: 71 BPM | DIASTOLIC BLOOD PRESSURE: 68 MMHG

## 2019-01-01 VITALS
OXYGEN SATURATION: 89 % | HEART RATE: 72 BPM | TEMPERATURE: 98.6 F | RESPIRATION RATE: 16 BRPM | DIASTOLIC BLOOD PRESSURE: 56 MMHG | SYSTOLIC BLOOD PRESSURE: 113 MMHG

## 2019-01-01 VITALS
HEART RATE: 71 BPM | DIASTOLIC BLOOD PRESSURE: 64 MMHG | SYSTOLIC BLOOD PRESSURE: 90 MMHG | RESPIRATION RATE: 23 BRPM | OXYGEN SATURATION: 96 %

## 2019-01-01 VITALS
OXYGEN SATURATION: 95 % | HEART RATE: 72 BPM | SYSTOLIC BLOOD PRESSURE: 124 MMHG | TEMPERATURE: 98.3 F | RESPIRATION RATE: 14 BRPM | DIASTOLIC BLOOD PRESSURE: 81 MMHG

## 2019-01-01 VITALS — HEART RATE: 80 BPM | DIASTOLIC BLOOD PRESSURE: 64 MMHG | RESPIRATION RATE: 18 BRPM | SYSTOLIC BLOOD PRESSURE: 120 MMHG

## 2019-01-01 VITALS
TEMPERATURE: 97 F | OXYGEN SATURATION: 90 % | DIASTOLIC BLOOD PRESSURE: 58 MMHG | HEART RATE: 58 BPM | RESPIRATION RATE: 13 BRPM | SYSTOLIC BLOOD PRESSURE: 96 MMHG

## 2019-01-01 VITALS
RESPIRATION RATE: 18 BRPM | HEART RATE: 72 BPM | DIASTOLIC BLOOD PRESSURE: 58 MMHG | OXYGEN SATURATION: 95 % | SYSTOLIC BLOOD PRESSURE: 106 MMHG

## 2019-01-01 VITALS
RESPIRATION RATE: 15 BRPM | HEART RATE: 66 BPM | DIASTOLIC BLOOD PRESSURE: 90 MMHG | TEMPERATURE: 97.8 F | SYSTOLIC BLOOD PRESSURE: 141 MMHG | OXYGEN SATURATION: 92 %

## 2019-01-01 VITALS
OXYGEN SATURATION: 91 % | RESPIRATION RATE: 18 BRPM | SYSTOLIC BLOOD PRESSURE: 98 MMHG | TEMPERATURE: 98.4 F | HEART RATE: 71 BPM | DIASTOLIC BLOOD PRESSURE: 74 MMHG

## 2019-01-01 VITALS — RESPIRATION RATE: 24 BRPM | HEART RATE: 75 BPM | SYSTOLIC BLOOD PRESSURE: 90 MMHG | DIASTOLIC BLOOD PRESSURE: 50 MMHG

## 2019-01-01 VITALS — RESPIRATION RATE: 20 BRPM | HEART RATE: 57 BPM | SYSTOLIC BLOOD PRESSURE: 108 MMHG | DIASTOLIC BLOOD PRESSURE: 66 MMHG

## 2019-01-01 VITALS
SYSTOLIC BLOOD PRESSURE: 122 MMHG | TEMPERATURE: 97.8 F | HEART RATE: 70 BPM | RESPIRATION RATE: 14 BRPM | OXYGEN SATURATION: 95 % | DIASTOLIC BLOOD PRESSURE: 80 MMHG

## 2019-01-01 VITALS — RESPIRATION RATE: 18 BRPM | HEART RATE: 70 BPM | DIASTOLIC BLOOD PRESSURE: 68 MMHG | SYSTOLIC BLOOD PRESSURE: 112 MMHG

## 2019-01-01 VITALS
TEMPERATURE: 98.5 F | HEART RATE: 78 BPM | DIASTOLIC BLOOD PRESSURE: 76 MMHG | OXYGEN SATURATION: 90 % | SYSTOLIC BLOOD PRESSURE: 100 MMHG | RESPIRATION RATE: 13 BRPM

## 2019-01-01 VITALS
SYSTOLIC BLOOD PRESSURE: 108 MMHG | OXYGEN SATURATION: 97 % | RESPIRATION RATE: 16 BRPM | HEART RATE: 88 BPM | DIASTOLIC BLOOD PRESSURE: 62 MMHG

## 2019-01-01 VITALS
DIASTOLIC BLOOD PRESSURE: 68 MMHG | OXYGEN SATURATION: 91 % | SYSTOLIC BLOOD PRESSURE: 110 MMHG | HEART RATE: 75 BPM | RESPIRATION RATE: 11 BRPM

## 2019-01-01 VITALS — SYSTOLIC BLOOD PRESSURE: 102 MMHG | DIASTOLIC BLOOD PRESSURE: 58 MMHG | OXYGEN SATURATION: 97 % | HEART RATE: 74 BPM

## 2019-01-01 VITALS
RESPIRATION RATE: 12 BRPM | SYSTOLIC BLOOD PRESSURE: 96 MMHG | TEMPERATURE: 98.3 F | HEART RATE: 84 BPM | OXYGEN SATURATION: 93 % | DIASTOLIC BLOOD PRESSURE: 53 MMHG

## 2019-01-01 VITALS
HEART RATE: 48 BPM | OXYGEN SATURATION: 92 % | RESPIRATION RATE: 18 BRPM | DIASTOLIC BLOOD PRESSURE: 80 MMHG | SYSTOLIC BLOOD PRESSURE: 150 MMHG

## 2019-01-01 VITALS — RESPIRATION RATE: 16 BRPM | SYSTOLIC BLOOD PRESSURE: 104 MMHG | DIASTOLIC BLOOD PRESSURE: 54 MMHG | HEART RATE: 93 BPM

## 2019-01-01 VITALS
HEART RATE: 70 BPM | OXYGEN SATURATION: 96 % | RESPIRATION RATE: 16 BRPM | DIASTOLIC BLOOD PRESSURE: 60 MMHG | SYSTOLIC BLOOD PRESSURE: 96 MMHG

## 2019-01-01 VITALS — DIASTOLIC BLOOD PRESSURE: 62 MMHG | RESPIRATION RATE: 16 BRPM | SYSTOLIC BLOOD PRESSURE: 114 MMHG | HEART RATE: 64 BPM

## 2019-01-01 VITALS
HEART RATE: 72 BPM | TEMPERATURE: 98.2 F | OXYGEN SATURATION: 95 % | SYSTOLIC BLOOD PRESSURE: 100 MMHG | DIASTOLIC BLOOD PRESSURE: 78 MMHG | RESPIRATION RATE: 13 BRPM

## 2019-01-01 VITALS — HEART RATE: 78 BPM | SYSTOLIC BLOOD PRESSURE: 106 MMHG | RESPIRATION RATE: 20 BRPM | DIASTOLIC BLOOD PRESSURE: 66 MMHG

## 2019-01-01 VITALS — HEART RATE: 73 BPM | DIASTOLIC BLOOD PRESSURE: 74 MMHG | SYSTOLIC BLOOD PRESSURE: 106 MMHG

## 2019-01-01 VITALS — DIASTOLIC BLOOD PRESSURE: 58 MMHG | SYSTOLIC BLOOD PRESSURE: 98 MMHG | HEART RATE: 67 BPM

## 2019-01-01 VITALS — DIASTOLIC BLOOD PRESSURE: 72 MMHG | SYSTOLIC BLOOD PRESSURE: 108 MMHG | HEART RATE: 98 BPM

## 2019-01-01 DIAGNOSIS — S32.010A CLOSED COMPRESSION FRACTURE OF FIRST LUMBAR VERTEBRA, INITIAL ENCOUNTER: ICD-10-CM

## 2019-01-01 DIAGNOSIS — R53.81 MALAISE: ICD-10-CM
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
## 2019-01-01 DIAGNOSIS — I50.9 ACUTE CONGESTIVE HEART FAILURE, UNSPECIFIED HEART FAILURE TYPE (HCC): Primary | ICD-10-CM

## 2019-01-01 DIAGNOSIS — S20.229A CONTUSION OF BACK, UNSPECIFIED LATERALITY, INITIAL ENCOUNTER: ICD-10-CM

## 2019-01-01 DIAGNOSIS — Z95.0 CARDIAC PACEMAKER IN SITU: Primary | ICD-10-CM

## 2019-01-01 DIAGNOSIS — S09.90XA CLOSED HEAD INJURY, INITIAL ENCOUNTER: ICD-10-CM

## 2019-01-01 DIAGNOSIS — R06.02 SHORTNESS OF BREATH: ICD-10-CM
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
## 2019-01-01 DIAGNOSIS — Z66 DO NOT RESUSCITATE: ICD-10-CM

## 2019-01-01 DIAGNOSIS — S32.020A CLOSED COMPRESSION FRACTURE OF SECOND LUMBAR VERTEBRA, INITIAL ENCOUNTER: ICD-10-CM

## 2019-01-01 DIAGNOSIS — S01.01XA SCALP LACERATION, INITIAL ENCOUNTER: Primary | ICD-10-CM

## 2019-01-01 DIAGNOSIS — R53.81 DEBILITY: ICD-10-CM

## 2019-01-01 DIAGNOSIS — G93.41 ACUTE METABOLIC ENCEPHALOPATHY: ICD-10-CM

## 2019-01-01 DIAGNOSIS — I48.92 ATRIAL FLUTTER, UNSPECIFIED TYPE (HCC): ICD-10-CM

## 2019-01-01 LAB
ABO + RH BLD: NORMAL
ABO + RH BLD: NORMAL
ALBUMIN SERPL-MCNC: 2.5 G/DL (ref 3.5–5)
ALBUMIN SERPL-MCNC: 2.6 G/DL (ref 3.5–5)
ALBUMIN SERPL-MCNC: 3 G/DL (ref 3.5–5)
ALBUMIN SERPL-MCNC: 3.2 G/DL (ref 3.5–5)
ALBUMIN SERPL-MCNC: 3.2 G/DL (ref 3.5–5)
ALBUMIN/GLOB SERPL: 0.5 {RATIO} (ref 1.1–2.2)
ALBUMIN/GLOB SERPL: 0.6 {RATIO} (ref 1.1–2.2)
ALP SERPL-CCNC: 106 U/L (ref 45–117)
ALP SERPL-CCNC: 108 U/L (ref 45–117)
ALP SERPL-CCNC: 112 U/L (ref 45–117)
ALP SERPL-CCNC: 114 U/L (ref 45–117)
ALP SERPL-CCNC: 137 U/L (ref 45–117)
ALT SERPL-CCNC: 11 U/L (ref 12–78)
ALT SERPL-CCNC: 18 U/L (ref 12–78)
ALT SERPL-CCNC: 18 U/L (ref 12–78)
ALT SERPL-CCNC: 19 U/L (ref 12–78)
ALT SERPL-CCNC: 8 U/L (ref 12–78)
AMMONIA PLAS-SCNC: 21 UMOL/L
ANION GAP BLD CALC-SCNC: 17 MMOL/L (ref 10–20)
ANION GAP SERPL CALC-SCNC: 11 MMOL/L (ref 5–15)
ANION GAP SERPL CALC-SCNC: 4 MMOL/L (ref 5–15)
ANION GAP SERPL CALC-SCNC: 5 MMOL/L (ref 5–15)
ANION GAP SERPL CALC-SCNC: 6 MMOL/L (ref 5–15)
ANION GAP SERPL CALC-SCNC: 7 MMOL/L (ref 5–15)
ANION GAP SERPL CALC-SCNC: 8 MMOL/L (ref 5–15)
ANION GAP SERPL CALC-SCNC: 9 MMOL/L (ref 5–15)
APPEARANCE UR: ABNORMAL
APPEARANCE UR: ABNORMAL
APPEARANCE UR: CLEAR
APTT PPP: 26.5 SEC (ref 22.1–32)
APTT PPP: 26.8 SEC (ref 22.1–32)
AST SERPL-CCNC: 18 U/L (ref 15–37)
AST SERPL-CCNC: 19 U/L (ref 15–37)
AST SERPL-CCNC: 23 U/L (ref 15–37)
AST SERPL-CCNC: 24 U/L (ref 15–37)
AST SERPL-CCNC: 25 U/L (ref 15–37)
ATRIAL RATE: 55 BPM
ATRIAL RATE: 69 BPM
ATRIAL RATE: 71 BPM
ATRIAL RATE: 81 BPM
ATRIAL RATE: 93 BPM
BACTERIA SPEC CULT: ABNORMAL
BACTERIA SPEC CULT: ABNORMAL
BACTERIA SPEC CULT: NORMAL
BACTERIA URNS QL MICRO: NEGATIVE /HPF
BASOPHILS # BLD: 0 K/UL (ref 0–0.1)
BASOPHILS NFR BLD: 0 % (ref 0–1)
BASOPHILS NFR BLD: 1 % (ref 0–1)
BASOPHILS NFR BLD: 1 % (ref 0–1)
BILIRUB SERPL-MCNC: 0.5 MG/DL (ref 0.2–1)
BILIRUB SERPL-MCNC: 0.7 MG/DL (ref 0.2–1)
BILIRUB SERPL-MCNC: 0.9 MG/DL (ref 0.2–1)
BILIRUB SERPL-MCNC: 1.1 MG/DL (ref 0.2–1)
BILIRUB SERPL-MCNC: 1.3 MG/DL (ref 0.2–1)
BILIRUB UR QL: NEGATIVE
BLOOD GROUP ANTIBODIES SERPL: NORMAL
BLOOD GROUP ANTIBODIES SERPL: NORMAL
BNP SERPL-MCNC: 7306 PG/ML
BUN BLD-MCNC: 18 MG/DL (ref 9–20)
BUN SERPL-MCNC: 17 MG/DL (ref 6–20)
BUN SERPL-MCNC: 18 MG/DL (ref 6–20)
BUN SERPL-MCNC: 19 MG/DL (ref 6–20)
BUN SERPL-MCNC: 20 MG/DL (ref 6–20)
BUN SERPL-MCNC: 22 MG/DL (ref 6–20)
BUN SERPL-MCNC: 23 MG/DL (ref 6–20)
BUN SERPL-MCNC: 27 MG/DL (ref 6–20)
BUN SERPL-MCNC: 27 MG/DL (ref 6–20)
BUN SERPL-MCNC: 28 MG/DL (ref 6–20)
BUN/CREAT SERPL: 15 (ref 12–20)
BUN/CREAT SERPL: 17 (ref 12–20)
BUN/CREAT SERPL: 19 (ref 12–20)
BUN/CREAT SERPL: 20 (ref 12–20)
BUN/CREAT SERPL: 22 (ref 12–20)
BUN/CREAT SERPL: 23 (ref 12–20)
BUN/CREAT SERPL: 24 (ref 12–20)
CA-I BLD-MCNC: 1.37 MMOL/L (ref 1.12–1.32)
CALCIUM SERPL-MCNC: 10 MG/DL (ref 8.5–10.1)
CALCIUM SERPL-MCNC: 10.1 MG/DL (ref 8.5–10.1)
CALCIUM SERPL-MCNC: 10.3 MG/DL (ref 8.5–10.1)
CALCIUM SERPL-MCNC: 10.5 MG/DL (ref 8.5–10.1)
CALCIUM SERPL-MCNC: 11 MG/DL (ref 8.5–10.1)
CALCIUM SERPL-MCNC: 9.5 MG/DL (ref 8.5–10.1)
CALCIUM SERPL-MCNC: 9.6 MG/DL (ref 8.5–10.1)
CALCIUM SERPL-MCNC: 9.7 MG/DL (ref 8.5–10.1)
CALCIUM SERPL-MCNC: 9.9 MG/DL (ref 8.5–10.1)
CALCULATED P AXIS, ECG09: -13 DEGREES
CALCULATED P AXIS, ECG09: -47 DEGREES
CALCULATED R AXIS, ECG10: -31 DEGREES
CALCULATED R AXIS, ECG10: 12 DEGREES
CALCULATED R AXIS, ECG10: 24 DEGREES
CALCULATED R AXIS, ECG10: 39 DEGREES
CALCULATED R AXIS, ECG10: 8 DEGREES
CALCULATED T AXIS, ECG11: 133 DEGREES
CALCULATED T AXIS, ECG11: 17 DEGREES
CALCULATED T AXIS, ECG11: 177 DEGREES
CALCULATED T AXIS, ECG11: 32 DEGREES
CALCULATED T AXIS, ECG11: 55 DEGREES
CC UR VC: ABNORMAL
CHLORIDE BLD-SCNC: 112 MMOL/L (ref 98–107)
CHLORIDE SERPL-SCNC: 106 MMOL/L (ref 97–108)
CHLORIDE SERPL-SCNC: 107 MMOL/L (ref 97–108)
CHLORIDE SERPL-SCNC: 108 MMOL/L (ref 97–108)
CHLORIDE SERPL-SCNC: 109 MMOL/L (ref 97–108)
CHLORIDE SERPL-SCNC: 110 MMOL/L (ref 97–108)
CHLORIDE SERPL-SCNC: 111 MMOL/L (ref 97–108)
CHLORIDE SERPL-SCNC: 111 MMOL/L (ref 97–108)
CHLORIDE SERPL-SCNC: 112 MMOL/L (ref 97–108)
CHLORIDE SERPL-SCNC: 112 MMOL/L (ref 97–108)
CHOLEST SERPL-MCNC: 138 MG/DL
CO2 BLD-SCNC: 20 MMOL/L (ref 21–32)
CO2 SERPL-SCNC: 20 MMOL/L (ref 21–32)
CO2 SERPL-SCNC: 20 MMOL/L (ref 21–32)
CO2 SERPL-SCNC: 22 MMOL/L (ref 21–32)
CO2 SERPL-SCNC: 23 MMOL/L (ref 21–32)
CO2 SERPL-SCNC: 24 MMOL/L (ref 21–32)
CO2 SERPL-SCNC: 25 MMOL/L (ref 21–32)
CO2 SERPL-SCNC: 27 MMOL/L (ref 21–32)
COLOR UR: ABNORMAL
COMMENT, HOLDF: NORMAL
CORTIS AM PEAK SERPL-MCNC: 11.2 UG/DL (ref 4.3–22.45)
CORTIS SERPL-MCNC: 11.9 UG/DL
CORTIS SERPL-MCNC: 22.2 UG/DL
CREAT BLD-MCNC: 1.2 MG/DL (ref 0.6–1.3)
CREAT SERPL-MCNC: 0.94 MG/DL (ref 0.7–1.3)
CREAT SERPL-MCNC: 1.01 MG/DL (ref 0.7–1.3)
CREAT SERPL-MCNC: 1.01 MG/DL (ref 0.7–1.3)
CREAT SERPL-MCNC: 1.02 MG/DL (ref 0.7–1.3)
CREAT SERPL-MCNC: 1.11 MG/DL (ref 0.7–1.3)
CREAT SERPL-MCNC: 1.2 MG/DL (ref 0.7–1.3)
CREAT SERPL-MCNC: 1.24 MG/DL (ref 0.7–1.3)
CREAT SERPL-MCNC: 1.31 MG/DL (ref 0.7–1.3)
CREAT SERPL-MCNC: 1.45 MG/DL (ref 0.7–1.3)
DIAGNOSIS, 93000: NORMAL
DIFFERENTIAL METHOD BLD: ABNORMAL
ECHO AO ROOT DIAM: 3.68 CM
ECHO AO ROOT DIAM: 4.49 CM
ECHO AV AREA PEAK VELOCITY: 1 CM2
ECHO AV AREA VTI: 1.1 CM2
ECHO AV MEAN GRADIENT: 14.5 MMHG
ECHO AV MEAN GRADIENT: 18.9 MMHG
ECHO AV PEAK GRADIENT: 21 MMHG
ECHO AV PEAK GRADIENT: 33.2 MMHG
ECHO AV PEAK VELOCITY: 228.91 CM/S
ECHO AV PEAK VELOCITY: 288.1 CM/S
ECHO AV REGURGITANT PHT: 462.4 CM
ECHO AV VTI: 40.62 CM
ECHO AV VTI: 56.57 CM
ECHO LA MAJOR AXIS: 4.6 CM
ECHO LA MAJOR AXIS: 4.95 CM
ECHO LA TO AORTIC ROOT RATIO: 1.03
ECHO LA TO AORTIC ROOT RATIO: 1.34
ECHO LV E' LATERAL VELOCITY: 6.79 CM/S
ECHO LV E' LATERAL VELOCITY: 9.38 CM/S
ECHO LV E' SEPTAL VELOCITY: 4.59 CM/S
ECHO LV E' SEPTAL VELOCITY: 4.94 CM/S
ECHO LV INTERNAL DIMENSION DIASTOLIC: 4.75 CM (ref 4.2–5.9)
ECHO LV INTERNAL DIMENSION DIASTOLIC: 5.58 CM (ref 4.2–5.9)
ECHO LV INTERNAL DIMENSION SYSTOLIC: 3.57 CM
ECHO LV INTERNAL DIMENSION SYSTOLIC: 5.1 CM
ECHO LV IVSD: 0.88 CM (ref 0.6–1)
ECHO LV IVSD: 1.17 CM (ref 0.6–1)
ECHO LV MASS 2D: 204.5 G (ref 88–224)
ECHO LV MASS 2D: 250.6 G (ref 88–224)
ECHO LV MASS INDEX 2D: 111.8 G/M2 (ref 49–115)
ECHO LV MASS INDEX 2D: 94.1 G/M2 (ref 49–115)
ECHO LV POSTERIOR WALL DIASTOLIC: 0.81 CM (ref 0.6–1)
ECHO LV POSTERIOR WALL DIASTOLIC: 1.2 CM (ref 0.6–1)
ECHO LVOT DIAM: 2.2 CM
ECHO LVOT PEAK GRADIENT: 1.5 MMHG
ECHO LVOT PEAK VELOCITY: 61.18 CM/S
ECHO LVOT SV: 42.8 ML
ECHO LVOT VTI: 11.29 CM
ECHO MV A VELOCITY: 91.4 CM/S
ECHO MV AREA PHT: 3.3 CM2
ECHO MV E DECELERATION TIME (DT): 228.7 MS
ECHO MV E VELOCITY: 83.06 CM/S
ECHO MV E/A RATIO: 0.91
ECHO MV E/E' LATERAL: 12.23
ECHO MV E/E' RATIO (AVERAGED): 15.16
ECHO MV E/E' SEPTAL: 18.1
ECHO MV PRESSURE HALF TIME (PHT): 66.3 MS
ECHO PV MAX VELOCITY: 33.3 CM/S
ECHO PV PEAK GRADIENT: 0.4 MMHG
ECHO RV INTERNAL DIMENSION: 4.95 CM
ECHO RV INTERNAL DIMENSION: 5.69 CM
ECHO TV REGURGITANT MAX VELOCITY: 216.84 CM/S
ECHO TV REGURGITANT MAX VELOCITY: 242.64 CM/S
ECHO TV REGURGITANT PEAK GRADIENT: 18.8 MMHG
ECHO TV REGURGITANT PEAK GRADIENT: 23.6 MMHG
EOSINOPHIL # BLD: 0 K/UL (ref 0–0.4)
EOSINOPHIL NFR BLD: 0 % (ref 0–7)
EOSINOPHIL NFR BLD: 1 % (ref 0–7)
EOSINOPHIL NFR BLD: 1 % (ref 0–7)
EPITH CASTS URNS QL MICRO: ABNORMAL /LPF
ERYTHROCYTE [DISTWIDTH] IN BLOOD BY AUTOMATED COUNT: 14.3 % (ref 11.5–14.5)
ERYTHROCYTE [DISTWIDTH] IN BLOOD BY AUTOMATED COUNT: 14.5 % (ref 11.5–14.5)
ERYTHROCYTE [DISTWIDTH] IN BLOOD BY AUTOMATED COUNT: 14.5 % (ref 11.5–14.5)
ERYTHROCYTE [DISTWIDTH] IN BLOOD BY AUTOMATED COUNT: 14.6 % (ref 11.5–14.5)
ERYTHROCYTE [DISTWIDTH] IN BLOOD BY AUTOMATED COUNT: 14.6 % (ref 11.5–14.5)
ERYTHROCYTE [DISTWIDTH] IN BLOOD BY AUTOMATED COUNT: 14.8 % (ref 11.5–14.5)
ERYTHROCYTE [DISTWIDTH] IN BLOOD BY AUTOMATED COUNT: 15.9 % (ref 11.5–14.5)
ERYTHROCYTE [DISTWIDTH] IN BLOOD BY AUTOMATED COUNT: 16.1 % (ref 11.5–14.5)
ERYTHROCYTE [DISTWIDTH] IN BLOOD BY AUTOMATED COUNT: 16.1 % (ref 11.5–14.5)
ERYTHROCYTE [DISTWIDTH] IN BLOOD BY AUTOMATED COUNT: 16.2 % (ref 11.5–14.5)
FOLATE SERPL-MCNC: 18.6 NG/ML (ref 5–21)
FOLATE SERPL-MCNC: 19.3 NG/ML (ref 5–21)
GLOBULIN SER CALC-MCNC: 4.3 G/DL (ref 2–4)
GLOBULIN SER CALC-MCNC: 4.8 G/DL (ref 2–4)
GLOBULIN SER CALC-MCNC: 5.1 G/DL (ref 2–4)
GLOBULIN SER CALC-MCNC: 5.4 G/DL (ref 2–4)
GLOBULIN SER CALC-MCNC: 5.7 G/DL (ref 2–4)
GLUCOSE BLD STRIP.AUTO-MCNC: 105 MG/DL (ref 65–100)
GLUCOSE BLD STRIP.AUTO-MCNC: 119 MG/DL (ref 65–100)
GLUCOSE BLD STRIP.AUTO-MCNC: 125 MG/DL (ref 65–100)
GLUCOSE BLD-MCNC: 128 MG/DL (ref 65–100)
GLUCOSE SERPL-MCNC: 100 MG/DL (ref 65–100)
GLUCOSE SERPL-MCNC: 101 MG/DL (ref 65–100)
GLUCOSE SERPL-MCNC: 105 MG/DL (ref 65–100)
GLUCOSE SERPL-MCNC: 126 MG/DL (ref 65–100)
GLUCOSE SERPL-MCNC: 88 MG/DL (ref 65–100)
GLUCOSE SERPL-MCNC: 93 MG/DL (ref 65–100)
GLUCOSE SERPL-MCNC: 94 MG/DL (ref 65–100)
GLUCOSE SERPL-MCNC: 95 MG/DL (ref 65–100)
GLUCOSE SERPL-MCNC: 96 MG/DL (ref 65–100)
GLUCOSE UR STRIP.AUTO-MCNC: NEGATIVE MG/DL
HCT VFR BLD AUTO: 31.5 % (ref 36.6–50.3)
HCT VFR BLD AUTO: 32.3 % (ref 36.6–50.3)
HCT VFR BLD AUTO: 34.2 % (ref 36.6–50.3)
HCT VFR BLD AUTO: 35.8 % (ref 36.6–50.3)
HCT VFR BLD AUTO: 35.8 % (ref 36.6–50.3)
HCT VFR BLD AUTO: 39.5 % (ref 36.6–50.3)
HCT VFR BLD AUTO: 39.7 % (ref 36.6–50.3)
HCT VFR BLD AUTO: 40.5 % (ref 36.6–50.3)
HCT VFR BLD AUTO: 41.9 % (ref 36.6–50.3)
HCT VFR BLD AUTO: 42.6 % (ref 36.6–50.3)
HCT VFR BLD CALC: 43 % (ref 36.6–50.3)
HDLC SERPL-MCNC: 42 MG/DL
HDLC SERPL: 3.3 {RATIO} (ref 0–5)
HGB BLD-MCNC: 10.2 G/DL (ref 12.1–17)
HGB BLD-MCNC: 10.4 G/DL (ref 12.1–17)
HGB BLD-MCNC: 10.8 G/DL (ref 12.1–17)
HGB BLD-MCNC: 11.1 G/DL (ref 12.1–17)
HGB BLD-MCNC: 11.5 G/DL (ref 12.1–17)
HGB BLD-MCNC: 12.5 G/DL (ref 12.1–17)
HGB BLD-MCNC: 13 G/DL (ref 12.1–17)
HGB BLD-MCNC: 13.2 G/DL (ref 12.1–17)
HGB BLD-MCNC: 13.2 G/DL (ref 12.1–17)
HGB BLD-MCNC: 13.6 G/DL (ref 12.1–17)
HGB UR QL STRIP: ABNORMAL
HGB UR QL STRIP: NEGATIVE
HGB UR QL STRIP: NEGATIVE
HYALINE CASTS URNS QL MICRO: ABNORMAL /LPF (ref 0–5)
IMM GRANULOCYTES # BLD AUTO: 0 K/UL
IMM GRANULOCYTES # BLD AUTO: 0 K/UL (ref 0–0.04)
IMM GRANULOCYTES # BLD AUTO: 0 K/UL (ref 0–0.04)
IMM GRANULOCYTES # BLD AUTO: 0.1 K/UL (ref 0–0.04)
IMM GRANULOCYTES # BLD AUTO: 0.1 K/UL (ref 0–0.04)
IMM GRANULOCYTES NFR BLD AUTO: 0 %
IMM GRANULOCYTES NFR BLD AUTO: 1 % (ref 0–0.5)
INR PPP: 1.1 (ref 0.9–1.1)
IRON SATN MFR SERPL: 23 % (ref 20–50)
IRON SERPL-MCNC: 47 UG/DL (ref 35–150)
KETONES UR QL STRIP.AUTO: NEGATIVE MG/DL
LACTATE BLD-SCNC: 2.08 MMOL/L (ref 0.4–2)
LDLC SERPL CALC-MCNC: 81.4 MG/DL (ref 0–100)
LEUKOCYTE ESTERASE UR QL STRIP.AUTO: NEGATIVE
LIPID PROFILE,FLP: NORMAL
LYMPHOCYTES # BLD: 0.9 K/UL (ref 0.8–3.5)
LYMPHOCYTES # BLD: 1.1 K/UL (ref 0.8–3.5)
LYMPHOCYTES # BLD: 1.3 K/UL (ref 0.8–3.5)
LYMPHOCYTES # BLD: 1.5 K/UL (ref 0.8–3.5)
LYMPHOCYTES # BLD: 1.6 K/UL (ref 0.8–3.5)
LYMPHOCYTES NFR BLD: 16 % (ref 12–49)
LYMPHOCYTES NFR BLD: 18 % (ref 12–49)
LYMPHOCYTES NFR BLD: 27 % (ref 12–49)
LYMPHOCYTES NFR BLD: 33 % (ref 12–49)
LYMPHOCYTES NFR BLD: 34 % (ref 12–49)
MAGNESIUM SERPL-MCNC: 2.3 MG/DL (ref 1.6–2.4)
MCH RBC QN AUTO: 32.2 PG (ref 26–34)
MCH RBC QN AUTO: 32.3 PG (ref 26–34)
MCH RBC QN AUTO: 32.3 PG (ref 26–34)
MCH RBC QN AUTO: 32.4 PG (ref 26–34)
MCH RBC QN AUTO: 32.4 PG (ref 26–34)
MCH RBC QN AUTO: 32.6 PG (ref 26–34)
MCH RBC QN AUTO: 32.7 PG (ref 26–34)
MCH RBC QN AUTO: 32.7 PG (ref 26–34)
MCH RBC QN AUTO: 32.9 PG (ref 26–34)
MCH RBC QN AUTO: 33 PG (ref 26–34)
MCHC RBC AUTO-ENTMCNC: 31 G/DL (ref 30–36.5)
MCHC RBC AUTO-ENTMCNC: 31.5 G/DL (ref 30–36.5)
MCHC RBC AUTO-ENTMCNC: 31.6 G/DL (ref 30–36.5)
MCHC RBC AUTO-ENTMCNC: 31.6 G/DL (ref 30–36.5)
MCHC RBC AUTO-ENTMCNC: 31.9 G/DL (ref 30–36.5)
MCHC RBC AUTO-ENTMCNC: 32.1 G/DL (ref 30–36.5)
MCHC RBC AUTO-ENTMCNC: 32.1 G/DL (ref 30–36.5)
MCHC RBC AUTO-ENTMCNC: 32.2 G/DL (ref 30–36.5)
MCHC RBC AUTO-ENTMCNC: 32.4 G/DL (ref 30–36.5)
MCHC RBC AUTO-ENTMCNC: 33.2 G/DL (ref 30–36.5)
MCV RBC AUTO: 100 FL (ref 80–99)
MCV RBC AUTO: 100.6 FL (ref 80–99)
MCV RBC AUTO: 100.9 FL (ref 80–99)
MCV RBC AUTO: 101.8 FL (ref 80–99)
MCV RBC AUTO: 102.3 FL (ref 80–99)
MCV RBC AUTO: 102.4 FL (ref 80–99)
MCV RBC AUTO: 103.3 FL (ref 80–99)
MCV RBC AUTO: 103.4 FL (ref 80–99)
MCV RBC AUTO: 104.1 FL (ref 80–99)
MCV RBC AUTO: 99.3 FL (ref 80–99)
MONOCYTES # BLD: 0.3 K/UL (ref 0–1)
MONOCYTES # BLD: 0.4 K/UL (ref 0–1)
MONOCYTES # BLD: 0.6 K/UL (ref 0–1)
MONOCYTES NFR BLD: 10 % (ref 5–13)
MONOCYTES NFR BLD: 5 % (ref 5–13)
MONOCYTES NFR BLD: 6 % (ref 5–13)
MONOCYTES NFR BLD: 6 % (ref 5–13)
MONOCYTES NFR BLD: 8 % (ref 5–13)
NEUTS SEG # BLD: 2.5 K/UL (ref 1.8–8)
NEUTS SEG # BLD: 2.7 K/UL (ref 1.8–8)
NEUTS SEG # BLD: 3.1 K/UL (ref 1.8–8)
NEUTS SEG # BLD: 4.2 K/UL (ref 1.8–8)
NEUTS SEG # BLD: 4.6 K/UL (ref 1.8–8)
NEUTS SEG NFR BLD: 56 % (ref 32–75)
NEUTS SEG NFR BLD: 58 % (ref 32–75)
NEUTS SEG NFR BLD: 66 % (ref 32–75)
NEUTS SEG NFR BLD: 74 % (ref 32–75)
NEUTS SEG NFR BLD: 76 % (ref 32–75)
NITRITE UR QL STRIP.AUTO: NEGATIVE
NRBC # BLD: 0 K/UL (ref 0–0.01)
NRBC BLD-RTO: 0 PER 100 WBC
P-R INTERVAL, ECG05: 180 MS
P-R INTERVAL, ECG05: 182 MS
P-R INTERVAL, ECG05: 216 MS
PH UR STRIP: 5.5 [PH] (ref 5–8)
PH UR STRIP: 7 [PH] (ref 5–8)
PH UR STRIP: 7.5 [PH] (ref 5–8)
PISA AR MAX VEL: 308.29 CM/S
PLATELET # BLD AUTO: 128 K/UL (ref 150–400)
PLATELET # BLD AUTO: 146 K/UL (ref 150–400)
PLATELET # BLD AUTO: 149 K/UL (ref 150–400)
PLATELET # BLD AUTO: 212 K/UL (ref 150–400)
PLATELET # BLD AUTO: 215 K/UL (ref 150–400)
PLATELET # BLD AUTO: 222 K/UL (ref 150–400)
PLATELET # BLD AUTO: 228 K/UL (ref 150–400)
PLATELET # BLD AUTO: 230 K/UL (ref 150–400)
PLATELET # BLD AUTO: 241 K/UL (ref 150–400)
PLATELET # BLD AUTO: 263 K/UL (ref 150–400)
PLATELET COMMENTS,PCOM: ABNORMAL
PMV BLD AUTO: 10.6 FL (ref 8.9–12.9)
PMV BLD AUTO: 10.7 FL (ref 8.9–12.9)
PMV BLD AUTO: 10.7 FL (ref 8.9–12.9)
PMV BLD AUTO: 10.8 FL (ref 8.9–12.9)
PMV BLD AUTO: 10.8 FL (ref 8.9–12.9)
PMV BLD AUTO: 10.9 FL (ref 8.9–12.9)
PMV BLD AUTO: 12 FL (ref 8.9–12.9)
PMV BLD AUTO: 12.1 FL (ref 8.9–12.9)
PMV BLD AUTO: 12.1 FL (ref 8.9–12.9)
PMV BLD AUTO: 12.6 FL (ref 8.9–12.9)
POTASSIUM BLD-SCNC: 3.7 MMOL/L (ref 3.5–5.1)
POTASSIUM SERPL-SCNC: 3.4 MMOL/L (ref 3.5–5.1)
POTASSIUM SERPL-SCNC: 3.5 MMOL/L (ref 3.5–5.1)
POTASSIUM SERPL-SCNC: 3.6 MMOL/L (ref 3.5–5.1)
POTASSIUM SERPL-SCNC: 3.8 MMOL/L (ref 3.5–5.1)
POTASSIUM SERPL-SCNC: 3.9 MMOL/L (ref 3.5–5.1)
POTASSIUM SERPL-SCNC: 4.2 MMOL/L (ref 3.5–5.1)
PROT SERPL-MCNC: 6.8 G/DL (ref 6.4–8.2)
PROT SERPL-MCNC: 7.4 G/DL (ref 6.4–8.2)
PROT SERPL-MCNC: 8.1 G/DL (ref 6.4–8.2)
PROT SERPL-MCNC: 8.6 G/DL (ref 6.4–8.2)
PROT SERPL-MCNC: 8.9 G/DL (ref 6.4–8.2)
PROT UR STRIP-MCNC: ABNORMAL MG/DL
PROT UR STRIP-MCNC: NEGATIVE MG/DL
PROT UR STRIP-MCNC: NEGATIVE MG/DL
PROTHROMBIN TIME: 10.9 SEC (ref 9–11.1)
PROTHROMBIN TIME: 10.9 SEC (ref 9–11.1)
PROTHROMBIN TIME: 11.5 SEC (ref 9–11.1)
Q-T INTERVAL, ECG07: 382 MS
Q-T INTERVAL, ECG07: 386 MS
Q-T INTERVAL, ECG07: 418 MS
Q-T INTERVAL, ECG07: 428 MS
Q-T INTERVAL, ECG07: 460 MS
QRS DURATION, ECG06: 106 MS
QRS DURATION, ECG06: 110 MS
QRS DURATION, ECG06: 182 MS
QRS DURATION, ECG06: 188 MS
QRS DURATION, ECG06: 92 MS
QTC CALCULATION (BEZET), ECG08: 399 MS
QTC CALCULATION (BEZET), ECG08: 419 MS
QTC CALCULATION (BEZET), ECG08: 443 MS
QTC CALCULATION (BEZET), ECG08: 549 MS
QTC CALCULATION (BEZET), ECG08: 571 MS
RBC # BLD AUTO: 3.15 M/UL (ref 4.1–5.7)
RBC # BLD AUTO: 3.21 M/UL (ref 4.1–5.7)
RBC # BLD AUTO: 3.31 M/UL (ref 4.1–5.7)
RBC # BLD AUTO: 3.44 M/UL (ref 4.1–5.7)
RBC # BLD AUTO: 3.5 M/UL (ref 4.1–5.7)
RBC # BLD AUTO: 3.82 M/UL (ref 4.1–5.7)
RBC # BLD AUTO: 3.98 M/UL (ref 4.1–5.7)
RBC # BLD AUTO: 4 M/UL (ref 4.1–5.7)
RBC # BLD AUTO: 4.09 M/UL (ref 4.1–5.7)
RBC # BLD AUTO: 4.22 M/UL (ref 4.1–5.7)
RBC #/AREA URNS HPF: ABNORMAL /HPF (ref 0–5)
RBC MORPH BLD: ABNORMAL
SAMPLES BEING HELD,HOLD: NORMAL
SERVICE CMNT-IMP: ABNORMAL
SERVICE CMNT-IMP: NORMAL
SODIUM BLD-SCNC: 144 MMOL/L (ref 136–145)
SODIUM SERPL-SCNC: 137 MMOL/L (ref 136–145)
SODIUM SERPL-SCNC: 138 MMOL/L (ref 136–145)
SODIUM SERPL-SCNC: 138 MMOL/L (ref 136–145)
SODIUM SERPL-SCNC: 140 MMOL/L (ref 136–145)
SODIUM SERPL-SCNC: 141 MMOL/L (ref 136–145)
SODIUM SERPL-SCNC: 142 MMOL/L (ref 136–145)
SODIUM SERPL-SCNC: 143 MMOL/L (ref 136–145)
SP GR UR REFRACTOMETRY: 1.01 (ref 1–1.03)
SP GR UR REFRACTOMETRY: 1.01 (ref 1–1.03)
SP GR UR REFRACTOMETRY: 1.02 (ref 1–1.03)
SPECIMEN EXP DATE BLD: NORMAL
SPECIMEN EXP DATE BLD: NORMAL
T3FREE SERPL-MCNC: 1.2 PG/ML (ref 2.2–4)
T4 FREE SERPL-MCNC: 1.1 NG/DL (ref 0.8–1.5)
THERAPEUTIC RANGE,PTTT: NORMAL SECS (ref 58–77)
THERAPEUTIC RANGE,PTTT: NORMAL SECS (ref 58–77)
TIBC SERPL-MCNC: 203 UG/DL (ref 250–450)
TRIGL SERPL-MCNC: 73 MG/DL (ref ?–150)
TROPONIN I BLD-MCNC: <0.04 NG/ML (ref 0–0.08)
TROPONIN I SERPL-MCNC: <0.05 NG/ML
TSH SERPL DL<=0.05 MIU/L-ACNC: 22.9 UIU/ML (ref 0.36–3.74)
TSH SERPL DL<=0.05 MIU/L-ACNC: 6.67 UIU/ML (ref 0.36–3.74)
TSH SERPL DL<=0.05 MIU/L-ACNC: 6.73 UIU/ML (ref 0.36–3.74)
UA: UC IF INDICATED,UAUC: ABNORMAL
UR CULT HOLD, URHOLD: NORMAL
UROBILINOGEN UR QL STRIP.AUTO: 1 EU/DL (ref 0.2–1)
VENTRICULAR RATE, ECG03: 55 BPM
VENTRICULAR RATE, ECG03: 71 BPM
VENTRICULAR RATE, ECG03: 81 BPM
VENTRICULAR RATE, ECG03: 93 BPM
VENTRICULAR RATE, ECG03: 99 BPM
VIT B12 SERPL-MCNC: 580 PG/ML (ref 193–986)
VIT B12 SERPL-MCNC: 585 PG/ML (ref 193–986)
VLDLC SERPL CALC-MCNC: 14.6 MG/DL
WBC # BLD AUTO: 4.1 K/UL (ref 4.1–11.1)
WBC # BLD AUTO: 4.4 K/UL (ref 4.1–11.1)
WBC # BLD AUTO: 4.4 K/UL (ref 4.1–11.1)
WBC # BLD AUTO: 4.8 K/UL (ref 4.1–11.1)
WBC # BLD AUTO: 4.8 K/UL (ref 4.1–11.1)
WBC # BLD AUTO: 5.3 K/UL (ref 4.1–11.1)
WBC # BLD AUTO: 5.7 K/UL (ref 4.1–11.1)
WBC # BLD AUTO: 5.8 K/UL (ref 4.1–11.1)
WBC # BLD AUTO: 6 K/UL (ref 4.1–11.1)
WBC # BLD AUTO: 7.6 K/UL (ref 4.1–11.1)
WBC MORPH BLD: ABNORMAL
WBC URNS QL MICRO: ABNORMAL /HPF (ref 0–4)

## 2019-01-01 PROCEDURE — 74011000258 HC RX REV CODE- 258: Performed by: FAMILY MEDICINE

## 2019-01-01 PROCEDURE — 84439 ASSAY OF FREE THYROXINE: CPT

## 2019-01-01 PROCEDURE — A9270 NON-COVERED ITEM OR SERVICE: HCPCS

## 2019-01-01 PROCEDURE — 99285 EMERGENCY DEPT VISIT HI MDM: CPT

## 2019-01-01 PROCEDURE — 0651 HSPC ROUTINE HOME CARE

## 2019-01-01 PROCEDURE — 85025 COMPLETE CBC W/AUTO DIFF WBC: CPT

## 2019-01-01 PROCEDURE — 36415 COLL VENOUS BLD VENIPUNCTURE: CPT

## 2019-01-01 PROCEDURE — 94760 N-INVAS EAR/PLS OXIMETRY 1: CPT

## 2019-01-01 PROCEDURE — A6250 SKIN SEAL PROTECT MOISTURIZR: HCPCS

## 2019-01-01 PROCEDURE — 93005 ELECTROCARDIOGRAM TRACING: CPT

## 2019-01-01 PROCEDURE — 81001 URINALYSIS AUTO W/SCOPE: CPT

## 2019-01-01 PROCEDURE — G0299 HHS/HOSPICE OF RN EA 15 MIN: HCPCS

## 2019-01-01 PROCEDURE — 74011250637 HC RX REV CODE- 250/637: Performed by: HOSPITALIST

## 2019-01-01 PROCEDURE — 80047 BASIC METABLC PNL IONIZED CA: CPT

## 2019-01-01 PROCEDURE — G0156 HHCP-SVS OF AIDE,EA 15 MIN: HCPCS

## 2019-01-01 PROCEDURE — 74011250636 HC RX REV CODE- 250/636

## 2019-01-01 PROCEDURE — 3336500001 HSPC ELECTION

## 2019-01-01 PROCEDURE — 99218 HC RM OBSERVATION: CPT

## 2019-01-01 PROCEDURE — T4524 ADULT SIZE BRIEF/DIAPER XL: HCPCS

## 2019-01-01 PROCEDURE — 65660000000 HC RM CCU STEPDOWN

## 2019-01-01 PROCEDURE — 96374 THER/PROPH/DIAG INJ IV PUSH: CPT

## 2019-01-01 PROCEDURE — G0155 HHCP-SVS OF CSW,EA 15 MIN: HCPCS

## 2019-01-01 PROCEDURE — P9047 ALBUMIN (HUMAN), 25%, 50ML: HCPCS | Performed by: HOSPITALIST

## 2019-01-01 PROCEDURE — 84443 ASSAY THYROID STIM HORMONE: CPT

## 2019-01-01 PROCEDURE — 93613 INTRACARDIAC EPHYS 3D MAPG: CPT

## 2019-01-01 PROCEDURE — L1830 KO IMMOB CANVAS LONG PRE OTS: HCPCS

## 2019-01-01 PROCEDURE — 82533 TOTAL CORTISOL: CPT

## 2019-01-01 PROCEDURE — 77030003390 HC NDL ANGI MRTM -A: Performed by: INTERNAL MEDICINE

## 2019-01-01 PROCEDURE — 87086 URINE CULTURE/COLONY COUNT: CPT

## 2019-01-01 PROCEDURE — 96361 HYDRATE IV INFUSION ADD-ON: CPT

## 2019-01-01 PROCEDURE — A4927 NON-STERILE GLOVES: HCPCS

## 2019-01-01 PROCEDURE — 82746 ASSAY OF FOLIC ACID SERUM: CPT

## 2019-01-01 PROCEDURE — 85610 PROTHROMBIN TIME: CPT

## 2019-01-01 PROCEDURE — 71275 CT ANGIOGRAPHY CHEST: CPT

## 2019-01-01 PROCEDURE — 84484 ASSAY OF TROPONIN QUANT: CPT

## 2019-01-01 PROCEDURE — 82607 VITAMIN B-12: CPT

## 2019-01-01 PROCEDURE — HOSPICE MEDICATION HC HH HOSPICE MEDICATION

## 2019-01-01 PROCEDURE — 74011000250 HC RX REV CODE- 250: Performed by: INTERNAL MEDICINE

## 2019-01-01 PROCEDURE — 77030002986 HC SUT PROL J&J -A: Performed by: INTERNAL MEDICINE

## 2019-01-01 PROCEDURE — C8929 TTE W OR WO FOL WCON,DOPPLER: HCPCS

## 2019-01-01 PROCEDURE — 74011250637 HC RX REV CODE- 250/637: Performed by: SPECIALIST

## 2019-01-01 PROCEDURE — 74011250636 HC RX REV CODE- 250/636: Performed by: FAMILY MEDICINE

## 2019-01-01 PROCEDURE — 83605 ASSAY OF LACTIC ACID: CPT

## 2019-01-01 PROCEDURE — 80053 COMPREHEN METABOLIC PANEL: CPT

## 2019-01-01 PROCEDURE — 97162 PT EVAL MOD COMPLEX 30 MIN: CPT

## 2019-01-01 PROCEDURE — 74011250637 HC RX REV CODE- 250/637: Performed by: NURSE PRACTITIONER

## 2019-01-01 PROCEDURE — 82962 GLUCOSE BLOOD TEST: CPT

## 2019-01-01 PROCEDURE — 74011250636 HC RX REV CODE- 250/636: Performed by: INTERNAL MEDICINE

## 2019-01-01 PROCEDURE — C1894 INTRO/SHEATH, NON-LASER: HCPCS | Performed by: INTERNAL MEDICINE

## 2019-01-01 PROCEDURE — C1733 CATH, EP, OTHR THAN COOL-TIP: HCPCS | Performed by: INTERNAL MEDICINE

## 2019-01-01 PROCEDURE — 74011250636 HC RX REV CODE- 250/636: Performed by: HOSPITALIST

## 2019-01-01 PROCEDURE — 77030013797 HC KT TRNSDUC PRSSR EDWD -A: Performed by: INTERNAL MEDICINE

## 2019-01-01 PROCEDURE — 87077 CULTURE AEROBIC IDENTIFY: CPT

## 2019-01-01 PROCEDURE — 83880 ASSAY OF NATRIURETIC PEPTIDE: CPT

## 2019-01-01 PROCEDURE — 74011250636 HC RX REV CODE- 250/636: Performed by: NURSE PRACTITIONER

## 2019-01-01 PROCEDURE — 96360 HYDRATION IV INFUSION INIT: CPT

## 2019-01-01 PROCEDURE — 97116 GAIT TRAINING THERAPY: CPT

## 2019-01-01 PROCEDURE — 76450000000

## 2019-01-01 PROCEDURE — 74011250637 HC RX REV CODE- 250/637: Performed by: INTERNAL MEDICINE

## 2019-01-01 PROCEDURE — B51B1ZZ FLUOROSCOPY OF RIGHT LOWER EXTREMITY VEINS USING LOW OSMOLAR CONTRAST: ICD-10-PCS | Performed by: INTERNAL MEDICINE

## 2019-01-01 PROCEDURE — T4541 LARGE DISPOSABLE UNDERPAD: HCPCS

## 2019-01-01 PROCEDURE — 72131 CT LUMBAR SPINE W/O DYE: CPT

## 2019-01-01 PROCEDURE — T4523 ADULT SIZE BRIEF/DIAPER LG: HCPCS

## 2019-01-01 PROCEDURE — 77030011943

## 2019-01-01 PROCEDURE — 70450 CT HEAD/BRAIN W/O DYE: CPT

## 2019-01-01 PROCEDURE — 02583ZZ DESTRUCTION OF CONDUCTION MECHANISM, PERCUTANEOUS APPROACH: ICD-10-PCS | Performed by: INTERNAL MEDICINE

## 2019-01-01 PROCEDURE — 74011636320 HC RX REV CODE- 636/320: Performed by: RADIOLOGY

## 2019-01-01 PROCEDURE — C1769 GUIDE WIRE: HCPCS | Performed by: INTERNAL MEDICINE

## 2019-01-01 PROCEDURE — 77030012929 HC DIL URET COOK -C: Performed by: INTERNAL MEDICINE

## 2019-01-01 PROCEDURE — 77030008467 HC STPLR SKN COVD -B

## 2019-01-01 PROCEDURE — 85027 COMPLETE CBC AUTOMATED: CPT

## 2019-01-01 PROCEDURE — 84481 FREE ASSAY (FT-3): CPT

## 2019-01-01 PROCEDURE — 97530 THERAPEUTIC ACTIVITIES: CPT

## 2019-01-01 PROCEDURE — 99284 EMERGENCY DEPT VISIT MOD MDM: CPT

## 2019-01-01 PROCEDURE — 33274 TCAT INSJ/RPL PERM LDLS PM: CPT | Performed by: INTERNAL MEDICINE

## 2019-01-01 PROCEDURE — 02K83ZZ MAP CONDUCTION MECHANISM, PERCUTANEOUS APPROACH: ICD-10-PCS | Performed by: INTERNAL MEDICINE

## 2019-01-01 PROCEDURE — 74011000250 HC RX REV CODE- 250: Performed by: EMERGENCY MEDICINE

## 2019-01-01 PROCEDURE — 93650 ICAR CATH ABLTJ AV NODE FUNC: CPT | Performed by: INTERNAL MEDICINE

## 2019-01-01 PROCEDURE — 97535 SELF CARE MNGMENT TRAINING: CPT

## 2019-01-01 PROCEDURE — 74011000250 HC RX REV CODE- 250: Performed by: HOSPITALIST

## 2019-01-01 PROCEDURE — 72072 X-RAY EXAM THORAC SPINE 3VWS: CPT

## 2019-01-01 PROCEDURE — HOSPICE MEDICATION 0636 HC HH HOSPICE MEDICATION

## 2019-01-01 PROCEDURE — 83735 ASSAY OF MAGNESIUM: CPT

## 2019-01-01 PROCEDURE — 65660000001 HC RM ICU INTERMED STEPDOWN

## 2019-01-01 PROCEDURE — 75810000293 HC SIMP/SUPERF WND  RPR

## 2019-01-01 PROCEDURE — 92526 ORAL FUNCTION THERAPY: CPT

## 2019-01-01 PROCEDURE — 76060000035 HC ANESTHESIA 2 TO 2.5 HR: Performed by: INTERNAL MEDICINE

## 2019-01-01 PROCEDURE — 72100 X-RAY EXAM L-S SPINE 2/3 VWS: CPT

## 2019-01-01 PROCEDURE — 74011000258 HC RX REV CODE- 258: Performed by: RADIOLOGY

## 2019-01-01 PROCEDURE — 77030010872 HC CBL EP BSC -C: Performed by: INTERNAL MEDICINE

## 2019-01-01 PROCEDURE — 72125 CT NECK SPINE W/O DYE: CPT

## 2019-01-01 PROCEDURE — 77030039046 HC PAD DEFIB RADIOTRNSPNT CNMD -B: Performed by: INTERNAL MEDICINE

## 2019-01-01 PROCEDURE — 97164 PT RE-EVAL EST PLAN CARE: CPT

## 2019-01-01 PROCEDURE — 77030004532 HC CATH ANGI DX IMP BSC -A: Performed by: INTERNAL MEDICINE

## 2019-01-01 PROCEDURE — 77030011256 HC DRSG MEPILEX <16IN NO BORD MOLN -A

## 2019-01-01 PROCEDURE — 80048 BASIC METABOLIC PNL TOTAL CA: CPT

## 2019-01-01 PROCEDURE — 74011250636 HC RX REV CODE- 250/636: Performed by: EMERGENCY MEDICINE

## 2019-01-01 PROCEDURE — 92610 EVALUATE SWALLOWING FUNCTION: CPT | Performed by: SPEECH-LANGUAGE PATHOLOGIST

## 2019-01-01 PROCEDURE — 97161 PT EVAL LOW COMPLEX 20 MIN: CPT

## 2019-01-01 PROCEDURE — 82140 ASSAY OF AMMONIA: CPT

## 2019-01-01 PROCEDURE — 87186 SC STD MICRODIL/AGAR DIL: CPT

## 2019-01-01 PROCEDURE — 77030031132 HC SUT NYL COVD -A

## 2019-01-01 PROCEDURE — A9286 ANY HYGIENIC ITEM, DEVICE: HCPCS

## 2019-01-01 PROCEDURE — E0325 URINAL MALE JUG-TYPE: HCPCS

## 2019-01-01 PROCEDURE — 74011636320 HC RX REV CODE- 636/320: Performed by: INTERNAL MEDICINE

## 2019-01-01 PROCEDURE — 83540 ASSAY OF IRON: CPT

## 2019-01-01 PROCEDURE — 97165 OT EVAL LOW COMPLEX 30 MIN: CPT

## 2019-01-01 PROCEDURE — ? HC HH HOSPICE MEDICATION

## 2019-01-01 PROCEDURE — 71045 X-RAY EXAM CHEST 1 VIEW: CPT

## 2019-01-01 PROCEDURE — 86900 BLOOD TYPING SEROLOGIC ABO: CPT

## 2019-01-01 PROCEDURE — 93306 TTE W/DOPPLER COMPLETE: CPT

## 2019-01-01 PROCEDURE — 85730 THROMBOPLASTIN TIME PARTIAL: CPT

## 2019-01-01 PROCEDURE — 77030018836 HC SOL IRR NACL ICUM -A

## 2019-01-01 PROCEDURE — 80061 LIPID PANEL: CPT

## 2019-01-01 PROCEDURE — HHS10554 SHAMPOO/BODY WASH 8 OZ ALOE VESTA

## 2019-01-01 PROCEDURE — 74011000258 HC RX REV CODE- 258: Performed by: INTERNAL MEDICINE

## 2019-01-01 PROCEDURE — 02HK3NZ INSERTION OF INTRACARDIAC PACEMAKER INTO RIGHT VENTRICLE, PERCUTANEOUS APPROACH: ICD-10-PCS | Performed by: INTERNAL MEDICINE

## 2019-01-01 PROCEDURE — 74177 CT ABD & PELVIS W/CONTRAST: CPT

## 2019-01-01 PROCEDURE — 87040 BLOOD CULTURE FOR BACTERIA: CPT

## 2019-01-01 PROCEDURE — 74011258636 HC RX REV CODE- 258/636: Performed by: FAMILY MEDICINE

## 2019-01-01 PROCEDURE — 77030010545

## 2019-01-01 PROCEDURE — C1786 PMKR, SINGLE, RATE-RESP: HCPCS | Performed by: INTERNAL MEDICINE

## 2019-01-01 PROCEDURE — HHS10335 MOUTHWASH  FLAVOR ALCOHOL FREE 4 OZ

## 2019-01-01 DEVICE — SYSTEM PACEMKR 0.8ML L25.9MM DIA6.7MM TRANSCATHETER INTRO: Type: IMPLANTABLE DEVICE | Status: FUNCTIONAL

## 2019-01-01 RX ORDER — FUROSEMIDE 10 MG/ML
20 INJECTION INTRAMUSCULAR; INTRAVENOUS
Status: COMPLETED | OUTPATIENT
Start: 2019-01-01 | End: 2019-01-01

## 2019-01-01 RX ORDER — MELATONIN
1000 DAILY
Status: DISCONTINUED | OUTPATIENT
Start: 2019-01-01 | End: 2019-01-01 | Stop reason: HOSPADM

## 2019-01-01 RX ORDER — ONDANSETRON 4 MG/1
4 TABLET, ORALLY DISINTEGRATING ORAL
Status: DISCONTINUED | OUTPATIENT
Start: 2019-01-01 | End: 2019-01-01 | Stop reason: HOSPADM

## 2019-01-01 RX ORDER — CLONIDINE HYDROCHLORIDE 0.2 MG/1
0.2 TABLET ORAL
Status: DISCONTINUED | OUTPATIENT
Start: 2019-01-01 | End: 2019-01-01

## 2019-01-01 RX ORDER — CEFAZOLIN SODIUM/WATER 2 G/20 ML
2 SYRINGE (ML) INTRAVENOUS ONCE
Status: COMPLETED | OUTPATIENT
Start: 2019-01-01 | End: 2019-01-01

## 2019-01-01 RX ORDER — PHENYLEPHRINE HCL IN 0.9% NACL 0.4MG/10ML
SYRINGE (ML) INTRAVENOUS AS NEEDED
Status: DISCONTINUED | OUTPATIENT
Start: 2019-01-01 | End: 2019-01-01 | Stop reason: HOSPADM

## 2019-01-01 RX ORDER — ASPIRIN 81 MG/1
81 TABLET ORAL DAILY
COMMUNITY
End: 2019-01-01

## 2019-01-01 RX ORDER — HEPARIN SODIUM 5000 [USP'U]/ML
5000 INJECTION, SOLUTION INTRAVENOUS; SUBCUTANEOUS EVERY 12 HOURS
Status: DISCONTINUED | OUTPATIENT
Start: 2019-01-01 | End: 2019-01-01 | Stop reason: HOSPADM

## 2019-01-01 RX ORDER — LISINOPRIL 20 MG/1
20 TABLET ORAL DAILY
Status: DISCONTINUED | OUTPATIENT
Start: 2019-01-01 | End: 2019-01-01

## 2019-01-01 RX ORDER — MIDODRINE HYDROCHLORIDE 5 MG/1
2.5 TABLET ORAL
Status: DISCONTINUED | OUTPATIENT
Start: 2019-01-01 | End: 2019-01-01 | Stop reason: HOSPADM

## 2019-01-01 RX ORDER — LEVOTHYROXINE SODIUM 125 UG/1
125 TABLET ORAL
COMMUNITY
End: 2019-01-01

## 2019-01-01 RX ORDER — PROPOFOL 10 MG/ML
INJECTION, EMULSION INTRAVENOUS
Status: DISCONTINUED | OUTPATIENT
Start: 2019-01-01 | End: 2019-01-01 | Stop reason: HOSPADM

## 2019-01-01 RX ORDER — POTASSIUM CHLORIDE 1.5 G/1.77G
20 POWDER, FOR SOLUTION ORAL ONCE
Status: COMPLETED | OUTPATIENT
Start: 2019-01-01 | End: 2019-01-01

## 2019-01-01 RX ORDER — LIDOCAINE HYDROCHLORIDE 20 MG/ML
INJECTION, SOLUTION EPIDURAL; INFILTRATION; INTRACAUDAL; PERINEURAL AS NEEDED
Status: DISCONTINUED | OUTPATIENT
Start: 2019-01-01 | End: 2019-01-01 | Stop reason: HOSPADM

## 2019-01-01 RX ORDER — DOCUSATE SODIUM 100 MG/1
100 CAPSULE, LIQUID FILLED ORAL AS NEEDED
Status: DISCONTINUED | OUTPATIENT
Start: 2019-01-01 | End: 2019-01-01 | Stop reason: HOSPADM

## 2019-01-01 RX ORDER — DILTIAZEM HYDROCHLORIDE 5 MG/ML
25 INJECTION INTRAVENOUS
Status: ACTIVE | OUTPATIENT
Start: 2019-01-01 | End: 2019-01-01

## 2019-01-01 RX ORDER — SODIUM CHLORIDE 0.9 % (FLUSH) 0.9 %
SYRINGE (ML) INJECTION
Status: DISPENSED
Start: 2019-01-01 | End: 2019-01-01

## 2019-01-01 RX ORDER — CEPHALEXIN 250 MG/1
250 CAPSULE ORAL 3 TIMES DAILY
Status: DISCONTINUED | OUTPATIENT
Start: 2019-01-01 | End: 2019-01-01 | Stop reason: HOSPADM

## 2019-01-01 RX ORDER — ALBUMIN HUMAN 250 G/1000ML
12.5 SOLUTION INTRAVENOUS EVERY 6 HOURS
Status: COMPLETED | OUTPATIENT
Start: 2019-01-01 | End: 2019-01-01

## 2019-01-01 RX ORDER — SODIUM CHLORIDE 0.9 % (FLUSH) 0.9 %
5-40 SYRINGE (ML) INJECTION AS NEEDED
Status: DISCONTINUED | OUTPATIENT
Start: 2019-01-01 | End: 2019-01-01 | Stop reason: HOSPADM

## 2019-01-01 RX ORDER — ONDANSETRON 2 MG/ML
4 INJECTION INTRAMUSCULAR; INTRAVENOUS
Status: DISCONTINUED | OUTPATIENT
Start: 2019-01-01 | End: 2019-01-01 | Stop reason: HOSPADM

## 2019-01-01 RX ORDER — MIDODRINE HYDROCHLORIDE 5 MG/1
5 TABLET ORAL
Qty: 90 TAB | Refills: 0 | Status: SHIPPED
Start: 2019-01-01 | End: 2019-01-01

## 2019-01-01 RX ORDER — LEVOTHYROXINE SODIUM 150 UG/1
150 TABLET ORAL
Status: DISCONTINUED | OUTPATIENT
Start: 2019-01-01 | End: 2019-01-01 | Stop reason: HOSPADM

## 2019-01-01 RX ORDER — LIDOCAINE HYDROCHLORIDE 10 MG/ML
INJECTION INFILTRATION; PERINEURAL AS NEEDED
Status: DISCONTINUED | OUTPATIENT
Start: 2019-01-01 | End: 2019-01-01 | Stop reason: HOSPADM

## 2019-01-01 RX ORDER — MORPHINE SULFATE 2 MG/ML
1 INJECTION, SOLUTION INTRAMUSCULAR; INTRAVENOUS
Status: DISCONTINUED | OUTPATIENT
Start: 2019-01-01 | End: 2019-01-01 | Stop reason: HOSPADM

## 2019-01-01 RX ORDER — ACETAMINOPHEN 325 MG/1
650 TABLET ORAL
Status: DISCONTINUED | OUTPATIENT
Start: 2019-01-01 | End: 2019-01-01 | Stop reason: HOSPADM

## 2019-01-01 RX ORDER — CARVEDILOL 3.12 MG/1
3.12 TABLET ORAL 2 TIMES DAILY WITH MEALS
Qty: 60 TAB | Refills: 2 | Status: SHIPPED | OUTPATIENT
Start: 2019-01-01

## 2019-01-01 RX ORDER — ASPIRIN 81 MG/1
81 TABLET ORAL DAILY
Status: DISCONTINUED | OUTPATIENT
Start: 2019-01-01 | End: 2019-01-01 | Stop reason: HOSPADM

## 2019-01-01 RX ORDER — SODIUM CHLORIDE 9 MG/ML
INJECTION, SOLUTION INTRAVENOUS
Status: DISCONTINUED | OUTPATIENT
Start: 2019-01-01 | End: 2019-01-01 | Stop reason: HOSPADM

## 2019-01-01 RX ORDER — SODIUM CHLORIDE 0.9 % (FLUSH) 0.9 %
5-40 SYRINGE (ML) INJECTION EVERY 8 HOURS
Status: DISCONTINUED | OUTPATIENT
Start: 2019-01-01 | End: 2019-01-01 | Stop reason: HOSPADM

## 2019-01-01 RX ORDER — SODIUM CHLORIDE AND POTASSIUM CHLORIDE .9; .15 G/100ML; G/100ML
SOLUTION INTRAVENOUS CONTINUOUS
Status: DISCONTINUED | OUTPATIENT
Start: 2019-01-01 | End: 2019-01-01

## 2019-01-01 RX ORDER — CEPHALEXIN 250 MG/1
250 CAPSULE ORAL 3 TIMES DAILY
Qty: 6 CAP | Refills: 0 | Status: SHIPPED
Start: 2019-01-01 | End: 2019-01-01

## 2019-01-01 RX ORDER — SODIUM CHLORIDE 0.9 % (FLUSH) 0.9 %
10 SYRINGE (ML) INJECTION
Status: COMPLETED | OUTPATIENT
Start: 2019-01-01 | End: 2019-01-01

## 2019-01-01 RX ORDER — ENOXAPARIN SODIUM 100 MG/ML
40 INJECTION SUBCUTANEOUS EVERY 24 HOURS
Status: DISCONTINUED | OUTPATIENT
Start: 2019-01-01 | End: 2019-01-01 | Stop reason: HOSPADM

## 2019-01-01 RX ORDER — POTASSIUM CHLORIDE 20 MEQ/1
20 TABLET, EXTENDED RELEASE ORAL DAILY
COMMUNITY
End: 2019-01-01

## 2019-01-01 RX ORDER — LEVOTHYROXINE SODIUM 125 UG/1
125 TABLET ORAL DAILY
Status: ON HOLD | COMMUNITY
End: 2019-01-01 | Stop reason: SDUPTHER

## 2019-01-01 RX ORDER — HYDROCODONE BITARTRATE AND ACETAMINOPHEN 5; 325 MG/1; MG/1
1 TABLET ORAL
Status: DISCONTINUED | OUTPATIENT
Start: 2019-01-01 | End: 2019-01-01 | Stop reason: HOSPADM

## 2019-01-01 RX ORDER — CARVEDILOL 6.25 MG/1
6.25 TABLET ORAL 2 TIMES DAILY WITH MEALS
Status: DISCONTINUED | OUTPATIENT
Start: 2019-01-01 | End: 2019-01-01

## 2019-01-01 RX ORDER — FUROSEMIDE 10 MG/ML
40 INJECTION INTRAMUSCULAR; INTRAVENOUS EVERY 12 HOURS
Status: DISCONTINUED | OUTPATIENT
Start: 2019-01-01 | End: 2019-01-01

## 2019-01-01 RX ORDER — CARVEDILOL 6.25 MG/1
6.25 TABLET ORAL 2 TIMES DAILY WITH MEALS
Status: DISCONTINUED | OUTPATIENT
Start: 2019-01-01 | End: 2019-01-01 | Stop reason: HOSPADM

## 2019-01-01 RX ORDER — PRAVASTATIN SODIUM 40 MG/1
40 TABLET ORAL DAILY
Status: DISCONTINUED | OUTPATIENT
Start: 2019-01-01 | End: 2019-01-01 | Stop reason: HOSPADM

## 2019-01-01 RX ORDER — METOPROLOL TARTRATE 25 MG/1
25 TABLET, FILM COATED ORAL EVERY 12 HOURS
Status: DISCONTINUED | OUTPATIENT
Start: 2019-01-01 | End: 2019-01-01

## 2019-01-01 RX ORDER — CARVEDILOL 3.12 MG/1
3.12 TABLET ORAL 2 TIMES DAILY WITH MEALS
Status: DISCONTINUED | OUTPATIENT
Start: 2019-01-01 | End: 2019-01-01

## 2019-01-01 RX ORDER — ONDANSETRON 4 MG/1
4 TABLET, ORALLY DISINTEGRATING ORAL
Qty: 30 TAB | Refills: 0 | Status: SHIPPED | OUTPATIENT
Start: 2019-01-01

## 2019-01-01 RX ORDER — SODIUM CHLORIDE 0.9 % (FLUSH) 0.9 %
5-40 SYRINGE (ML) INJECTION EVERY 8 HOURS
Status: DISCONTINUED | OUTPATIENT
Start: 2019-01-01 | End: 2019-01-01 | Stop reason: SDUPTHER

## 2019-01-01 RX ORDER — SODIUM CHLORIDE 9 MG/ML
100 INJECTION, SOLUTION INTRAVENOUS CONTINUOUS
Status: DISCONTINUED | OUTPATIENT
Start: 2019-01-01 | End: 2019-01-01

## 2019-01-01 RX ORDER — LEVOTHYROXINE SODIUM 150 UG/1
150 TABLET ORAL
Qty: 30 TAB | Refills: 2 | Status: SHIPPED | OUTPATIENT
Start: 2019-01-01

## 2019-01-01 RX ORDER — CEFAZOLIN SODIUM/WATER 2 G/20 ML
2 SYRINGE (ML) INTRAVENOUS ONCE
Status: DISCONTINUED | OUTPATIENT
Start: 2019-01-01 | End: 2019-01-01 | Stop reason: SDUPTHER

## 2019-01-01 RX ORDER — CEFAZOLIN SODIUM/WATER 2 G/20 ML
2 SYRINGE (ML) INTRAVENOUS EVERY 8 HOURS
Status: COMPLETED | OUTPATIENT
Start: 2019-01-01 | End: 2019-01-01

## 2019-01-01 RX ORDER — FUROSEMIDE 10 MG/ML
20 INJECTION INTRAMUSCULAR; INTRAVENOUS EVERY 12 HOURS
Status: DISCONTINUED | OUTPATIENT
Start: 2019-01-01 | End: 2019-01-01

## 2019-01-01 RX ORDER — BISACODYL 5 MG
5 TABLET, DELAYED RELEASE (ENTERIC COATED) ORAL AS NEEDED
Status: DISCONTINUED | OUTPATIENT
Start: 2019-01-01 | End: 2019-01-01 | Stop reason: HOSPADM

## 2019-01-01 RX ORDER — NALOXONE HYDROCHLORIDE 0.4 MG/ML
0.4 INJECTION, SOLUTION INTRAMUSCULAR; INTRAVENOUS; SUBCUTANEOUS AS NEEDED
Status: DISCONTINUED | OUTPATIENT
Start: 2019-01-01 | End: 2019-01-01 | Stop reason: HOSPADM

## 2019-01-01 RX ORDER — BALSAM PERU/CASTOR OIL
OINTMENT (GRAM) TOPICAL 2 TIMES DAILY
Status: DISCONTINUED | OUTPATIENT
Start: 2019-01-01 | End: 2019-01-01 | Stop reason: HOSPADM

## 2019-01-01 RX ORDER — BISACODYL 5 MG
5 TABLET, DELAYED RELEASE (ENTERIC COATED) ORAL AS NEEDED
Qty: 30 TAB | Refills: 0 | Status: SHIPPED
Start: 2019-01-01

## 2019-01-01 RX ORDER — BISACODYL 5 MG
5 TABLET, DELAYED RELEASE (ENTERIC COATED) ORAL DAILY PRN
Status: DISCONTINUED | OUTPATIENT
Start: 2019-01-01 | End: 2019-01-01 | Stop reason: HOSPADM

## 2019-01-01 RX ORDER — MELATONIN
1000 DAILY
COMMUNITY

## 2019-01-01 RX ORDER — TAMSULOSIN HYDROCHLORIDE 0.4 MG/1
0.4 CAPSULE ORAL DAILY
Status: DISCONTINUED | OUTPATIENT
Start: 2019-01-01 | End: 2019-01-01 | Stop reason: HOSPADM

## 2019-01-01 RX ORDER — HYDRALAZINE HYDROCHLORIDE 20 MG/ML
10 INJECTION INTRAMUSCULAR; INTRAVENOUS
Status: DISCONTINUED | OUTPATIENT
Start: 2019-01-01 | End: 2019-01-01 | Stop reason: HOSPADM

## 2019-01-01 RX ORDER — SODIUM CHLORIDE, SODIUM LACTATE, POTASSIUM CHLORIDE, CALCIUM CHLORIDE 600; 310; 30; 20 MG/100ML; MG/100ML; MG/100ML; MG/100ML
100 INJECTION, SOLUTION INTRAVENOUS CONTINUOUS
Status: DISCONTINUED | OUTPATIENT
Start: 2019-01-01 | End: 2019-01-01

## 2019-01-01 RX ORDER — LEVOTHYROXINE SODIUM 125 UG/1
150 TABLET ORAL DAILY
Qty: 30 TAB | Refills: 0 | Status: SHIPPED
Start: 2019-01-01 | End: 2019-01-01

## 2019-01-01 RX ORDER — DEXTROSE MONOHYDRATE AND SODIUM CHLORIDE 5; .9 G/100ML; G/100ML
50 INJECTION, SOLUTION INTRAVENOUS CONTINUOUS
Status: DISCONTINUED | OUTPATIENT
Start: 2019-01-01 | End: 2019-01-01 | Stop reason: HOSPADM

## 2019-01-01 RX ORDER — BACITRACIN 500 [USP'U]/G
OINTMENT TOPICAL 3 TIMES DAILY
Status: DISCONTINUED | OUTPATIENT
Start: 2019-01-01 | End: 2019-01-01 | Stop reason: HOSPADM

## 2019-01-01 RX ORDER — METOPROLOL SUCCINATE 25 MG/1
25 TABLET, EXTENDED RELEASE ORAL DAILY
Status: DISCONTINUED | OUTPATIENT
Start: 2019-01-01 | End: 2019-01-01

## 2019-01-01 RX ORDER — LIDOCAINE HYDROCHLORIDE AND EPINEPHRINE 10; 10 MG/ML; UG/ML
1.5 INJECTION, SOLUTION INFILTRATION; PERINEURAL
Status: COMPLETED | OUTPATIENT
Start: 2019-01-01 | End: 2019-01-01

## 2019-01-01 RX ORDER — LIDOCAINE HYDROCHLORIDE AND EPINEPHRINE 10; 10 MG/ML; UG/ML
1.5 INJECTION, SOLUTION INFILTRATION; PERINEURAL ONCE
Status: COMPLETED | OUTPATIENT
Start: 2019-01-01 | End: 2019-01-01

## 2019-01-01 RX ORDER — HEPARIN SODIUM 1000 [USP'U]/ML
INJECTION, SOLUTION INTRAVENOUS; SUBCUTANEOUS AS NEEDED
Status: DISCONTINUED | OUTPATIENT
Start: 2019-01-01 | End: 2019-01-01 | Stop reason: HOSPADM

## 2019-01-01 RX ORDER — PROPOFOL 10 MG/ML
INJECTION, EMULSION INTRAVENOUS AS NEEDED
Status: DISCONTINUED | OUTPATIENT
Start: 2019-01-01 | End: 2019-01-01 | Stop reason: HOSPADM

## 2019-01-01 RX ORDER — SODIUM CHLORIDE 0.9 % (FLUSH) 0.9 %
5-40 SYRINGE (ML) INJECTION AS NEEDED
Status: DISCONTINUED | OUTPATIENT
Start: 2019-01-01 | End: 2019-01-01 | Stop reason: SDUPTHER

## 2019-01-01 RX ADMIN — POTASSIUM CHLORIDE 20 MEQ: 1.5 POWDER, FOR SOLUTION ORAL at 15:00

## 2019-01-01 RX ADMIN — MIDODRINE HYDROCHLORIDE 2.5 MG: 5 TABLET ORAL at 09:35

## 2019-01-01 RX ADMIN — Medication 10 ML: at 15:32

## 2019-01-01 RX ADMIN — Medication 10 MG: at 10:10

## 2019-01-01 RX ADMIN — PRAVASTATIN SODIUM 40 MG: 40 TABLET ORAL at 09:32

## 2019-01-01 RX ADMIN — ENOXAPARIN SODIUM 40 MG: 40 INJECTION SUBCUTANEOUS at 12:45

## 2019-01-01 RX ADMIN — LEVOTHYROXINE SODIUM 150 MCG: 150 TABLET ORAL at 06:44

## 2019-01-01 RX ADMIN — CARVEDILOL 6.25 MG: 6.25 TABLET, FILM COATED ORAL at 08:20

## 2019-01-01 RX ADMIN — METOPROLOL TARTRATE 25 MG: 25 TABLET ORAL at 17:35

## 2019-01-01 RX ADMIN — MIDODRINE HYDROCHLORIDE 2.5 MG: 5 TABLET ORAL at 17:19

## 2019-01-01 RX ADMIN — BACITRACIN: 500 OINTMENT TOPICAL at 20:43

## 2019-01-01 RX ADMIN — BACITRACIN: 500 OINTMENT TOPICAL at 21:44

## 2019-01-01 RX ADMIN — ASPIRIN 81 MG: 81 TABLET, COATED ORAL at 09:03

## 2019-01-01 RX ADMIN — BACITRACIN: 500 OINTMENT TOPICAL at 22:58

## 2019-01-01 RX ADMIN — ASPIRIN 81 MG: 81 TABLET, COATED ORAL at 09:13

## 2019-01-01 RX ADMIN — Medication 80 MCG: at 11:51

## 2019-01-01 RX ADMIN — ASPIRIN 81 MG: 81 TABLET, COATED ORAL at 09:24

## 2019-01-01 RX ADMIN — FUROSEMIDE 20 MG: 10 INJECTION, SOLUTION INTRAMUSCULAR; INTRAVENOUS at 11:37

## 2019-01-01 RX ADMIN — CEPHALEXIN 250 MG: 250 CAPSULE ORAL at 22:27

## 2019-01-01 RX ADMIN — LEVOTHYROXINE SODIUM 150 MCG: 150 TABLET ORAL at 06:33

## 2019-01-01 RX ADMIN — MIDODRINE HYDROCHLORIDE 2.5 MG: 5 TABLET ORAL at 09:33

## 2019-01-01 RX ADMIN — PRAVASTATIN SODIUM 40 MG: 40 TABLET ORAL at 08:20

## 2019-01-01 RX ADMIN — TAMSULOSIN HYDROCHLORIDE 0.4 MG: 0.4 CAPSULE ORAL at 08:12

## 2019-01-01 RX ADMIN — PRAVASTATIN SODIUM 40 MG: 40 TABLET ORAL at 09:26

## 2019-01-01 RX ADMIN — Medication 10 ML: at 21:26

## 2019-01-01 RX ADMIN — SODIUM CHLORIDE 500 ML: 900 INJECTION, SOLUTION INTRAVENOUS at 16:42

## 2019-01-01 RX ADMIN — Medication 10 ML: at 13:00

## 2019-01-01 RX ADMIN — HEPARIN SODIUM 5000 UNITS: 5000 INJECTION INTRAVENOUS; SUBCUTANEOUS at 06:36

## 2019-01-01 RX ADMIN — SODIUM CHLORIDE 100 ML: 900 INJECTION, SOLUTION INTRAVENOUS at 10:11

## 2019-01-01 RX ADMIN — MIDODRINE HYDROCHLORIDE 2.5 MG: 5 TABLET ORAL at 13:00

## 2019-01-01 RX ADMIN — CEPHALEXIN 250 MG: 250 CAPSULE ORAL at 09:32

## 2019-01-01 RX ADMIN — SODIUM CHLORIDE AND POTASSIUM CHLORIDE: 9; 1.49 INJECTION, SOLUTION INTRAVENOUS at 23:55

## 2019-01-01 RX ADMIN — Medication 2 G: at 02:53

## 2019-01-01 RX ADMIN — BACITRACIN: 500 OINTMENT TOPICAL at 09:25

## 2019-01-01 RX ADMIN — CARVEDILOL 6.25 MG: 6.25 TABLET, FILM COATED ORAL at 17:51

## 2019-01-01 RX ADMIN — Medication 10 ML: at 00:18

## 2019-01-01 RX ADMIN — ALBUMIN (HUMAN) 12.5 G: 0.25 INJECTION, SOLUTION INTRAVENOUS at 06:40

## 2019-01-01 RX ADMIN — SODIUM CHLORIDE 250 ML: 900 INJECTION, SOLUTION INTRAVENOUS at 18:36

## 2019-01-01 RX ADMIN — Medication 10 ML: at 21:28

## 2019-01-01 RX ADMIN — SODIUM CHLORIDE, SODIUM LACTATE, POTASSIUM CHLORIDE, AND CALCIUM CHLORIDE 100 ML/HR: 600; 310; 30; 20 INJECTION, SOLUTION INTRAVENOUS at 02:39

## 2019-01-01 RX ADMIN — BACITRACIN: 500 OINTMENT TOPICAL at 21:36

## 2019-01-01 RX ADMIN — MIDODRINE HYDROCHLORIDE 2.5 MG: 5 TABLET ORAL at 09:13

## 2019-01-01 RX ADMIN — CLONIDINE HYDROCHLORIDE 0.2 MG: 0.2 TABLET ORAL at 14:05

## 2019-01-01 RX ADMIN — ENOXAPARIN SODIUM 40 MG: 40 INJECTION SUBCUTANEOUS at 15:41

## 2019-01-01 RX ADMIN — IOPAMIDOL 80 ML: 755 INJECTION, SOLUTION INTRAVENOUS at 15:00

## 2019-01-01 RX ADMIN — SODIUM CHLORIDE 1000 ML: 900 INJECTION, SOLUTION INTRAVENOUS at 11:32

## 2019-01-01 RX ADMIN — Medication 10 ML: at 05:16

## 2019-01-01 RX ADMIN — Medication 10 ML: at 08:07

## 2019-01-01 RX ADMIN — TAMSULOSIN HYDROCHLORIDE 0.4 MG: 0.4 CAPSULE ORAL at 09:03

## 2019-01-01 RX ADMIN — FAMOTIDINE 20 MG: 10 INJECTION, SOLUTION INTRAVENOUS at 08:25

## 2019-01-01 RX ADMIN — LISINOPRIL 20 MG: 20 TABLET ORAL at 08:12

## 2019-01-01 RX ADMIN — CEPHALEXIN 250 MG: 250 CAPSULE ORAL at 15:42

## 2019-01-01 RX ADMIN — SODIUM CHLORIDE 1000 ML: 900 INJECTION, SOLUTION INTRAVENOUS at 12:09

## 2019-01-01 RX ADMIN — ASPIRIN 81 MG: 81 TABLET ORAL at 08:25

## 2019-01-01 RX ADMIN — Medication 100 MCG: at 12:13

## 2019-01-01 RX ADMIN — CARVEDILOL 3.12 MG: 3.12 TABLET, FILM COATED ORAL at 18:02

## 2019-01-01 RX ADMIN — VITAMIN D 1000 UNITS: 25 TAB ORAL at 08:26

## 2019-01-01 RX ADMIN — Medication 10 ML: at 06:00

## 2019-01-01 RX ADMIN — HEPARIN SODIUM 5000 UNITS: 5000 INJECTION INTRAVENOUS; SUBCUTANEOUS at 18:27

## 2019-01-01 RX ADMIN — LEVOTHYROXINE SODIUM 150 MCG: 150 TABLET ORAL at 06:36

## 2019-01-01 RX ADMIN — MIDODRINE HYDROCHLORIDE 2.5 MG: 5 TABLET ORAL at 15:42

## 2019-01-01 RX ADMIN — Medication 10 ML: at 21:45

## 2019-01-01 RX ADMIN — VITAMIN D 1000 UNITS: 25 TAB ORAL at 08:20

## 2019-01-01 RX ADMIN — SODIUM CHLORIDE, SODIUM LACTATE, POTASSIUM CHLORIDE, AND CALCIUM CHLORIDE 100 ML/HR: 600; 310; 30; 20 INJECTION, SOLUTION INTRAVENOUS at 15:42

## 2019-01-01 RX ADMIN — Medication 10 ML: at 06:45

## 2019-01-01 RX ADMIN — PROPOFOL 75 MCG/KG/MIN: 10 INJECTION, EMULSION INTRAVENOUS at 10:52

## 2019-01-01 RX ADMIN — ASPIRIN 81 MG: 81 TABLET, COATED ORAL at 09:32

## 2019-01-01 RX ADMIN — SODIUM CHLORIDE, SODIUM LACTATE, POTASSIUM CHLORIDE, AND CALCIUM CHLORIDE 100 ML/HR: 600; 310; 30; 20 INJECTION, SOLUTION INTRAVENOUS at 23:41

## 2019-01-01 RX ADMIN — FAMOTIDINE 20 MG: 10 INJECTION, SOLUTION INTRAVENOUS at 08:20

## 2019-01-01 RX ADMIN — ASPIRIN 81 MG: 81 TABLET, COATED ORAL at 09:26

## 2019-01-01 RX ADMIN — TAMSULOSIN HYDROCHLORIDE 0.4 MG: 0.4 CAPSULE ORAL at 09:26

## 2019-01-01 RX ADMIN — IOPAMIDOL 100 ML: 755 INJECTION, SOLUTION INTRAVENOUS at 10:11

## 2019-01-01 RX ADMIN — BACITRACIN: 500 OINTMENT TOPICAL at 22:29

## 2019-01-01 RX ADMIN — BACITRACIN: 500 OINTMENT TOPICAL at 10:17

## 2019-01-01 RX ADMIN — DEXTROSE MONOHYDRATE AND SODIUM CHLORIDE 50 ML/HR: 5; .9 INJECTION, SOLUTION INTRAVENOUS at 01:45

## 2019-01-01 RX ADMIN — ASPIRIN 81 MG: 81 TABLET ORAL at 08:20

## 2019-01-01 RX ADMIN — MIDODRINE HYDROCHLORIDE 2.5 MG: 5 TABLET ORAL at 12:55

## 2019-01-01 RX ADMIN — BACITRACIN: 500 OINTMENT TOPICAL at 08:14

## 2019-01-01 RX ADMIN — Medication 10 ML: at 22:31

## 2019-01-01 RX ADMIN — Medication 10 ML: at 14:00

## 2019-01-01 RX ADMIN — SODIUM CHLORIDE: 9 INJECTION, SOLUTION INTRAVENOUS at 12:25

## 2019-01-01 RX ADMIN — VITAMIN D 1000 UNITS: 25 TAB ORAL at 09:25

## 2019-01-01 RX ADMIN — BACITRACIN: 500 OINTMENT TOPICAL at 09:00

## 2019-01-01 RX ADMIN — SODIUM CHLORIDE: 9 INJECTION, SOLUTION INTRAVENOUS at 10:36

## 2019-01-01 RX ADMIN — ALBUMIN (HUMAN) 12.5 G: 0.25 INJECTION, SOLUTION INTRAVENOUS at 12:54

## 2019-01-01 RX ADMIN — SODIUM CHLORIDE 1.5 ML: 9 INJECTION INTRAMUSCULAR; INTRAVENOUS; SUBCUTANEOUS at 09:14

## 2019-01-01 RX ADMIN — Medication 10 ML: at 21:37

## 2019-01-01 RX ADMIN — ASPIRIN 81 MG: 81 TABLET ORAL at 09:25

## 2019-01-01 RX ADMIN — BACITRACIN: 500 OINTMENT TOPICAL at 09:32

## 2019-01-01 RX ADMIN — BACITRACIN: 500 OINTMENT TOPICAL at 09:15

## 2019-01-01 RX ADMIN — BACITRACIN: 500 OINTMENT TOPICAL at 21:25

## 2019-01-01 RX ADMIN — LIDOCAINE HYDROCHLORIDE,EPINEPHRINE BITARTRATE 15 MG: 10; .01 INJECTION, SOLUTION INFILTRATION; PERINEURAL at 09:30

## 2019-01-01 RX ADMIN — ONDANSETRON 4 MG: 2 INJECTION INTRAMUSCULAR; INTRAVENOUS at 09:44

## 2019-01-01 RX ADMIN — CEFTRIAXONE 1 G: 1 INJECTION, POWDER, FOR SOLUTION INTRAMUSCULAR; INTRAVENOUS at 23:30

## 2019-01-01 RX ADMIN — MIDODRINE HYDROCHLORIDE 2.5 MG: 5 TABLET ORAL at 11:19

## 2019-01-01 RX ADMIN — CEPHALEXIN 250 MG: 250 CAPSULE ORAL at 16:38

## 2019-01-01 RX ADMIN — SODIUM CHLORIDE AND POTASSIUM CHLORIDE: 9; 1.49 INJECTION, SOLUTION INTRAVENOUS at 14:06

## 2019-01-01 RX ADMIN — MIDODRINE HYDROCHLORIDE 2.5 MG: 5 TABLET ORAL at 11:31

## 2019-01-01 RX ADMIN — PRAVASTATIN SODIUM 40 MG: 40 TABLET ORAL at 09:25

## 2019-01-01 RX ADMIN — SODIUM CHLORIDE, SODIUM LACTATE, POTASSIUM CHLORIDE, AND CALCIUM CHLORIDE 100 ML/HR: 600; 310; 30; 20 INJECTION, SOLUTION INTRAVENOUS at 18:10

## 2019-01-01 RX ADMIN — BACITRACIN: 500 OINTMENT TOPICAL at 21:28

## 2019-01-01 RX ADMIN — SODIUM CHLORIDE, SODIUM LACTATE, POTASSIUM CHLORIDE, AND CALCIUM CHLORIDE 100 ML/HR: 600; 310; 30; 20 INJECTION, SOLUTION INTRAVENOUS at 04:12

## 2019-01-01 RX ADMIN — PRAVASTATIN SODIUM 40 MG: 40 TABLET ORAL at 08:12

## 2019-01-01 RX ADMIN — Medication 20 ML: at 14:00

## 2019-01-01 RX ADMIN — Medication 10 ML: at 21:27

## 2019-01-01 RX ADMIN — PRAVASTATIN SODIUM 40 MG: 40 TABLET ORAL at 09:13

## 2019-01-01 RX ADMIN — HEPARIN SODIUM 5000 UNITS: 5000 INJECTION INTRAVENOUS; SUBCUTANEOUS at 17:51

## 2019-01-01 RX ADMIN — CEPHALEXIN 250 MG: 250 CAPSULE ORAL at 22:00

## 2019-01-01 RX ADMIN — FAMOTIDINE 20 MG: 10 INJECTION, SOLUTION INTRAVENOUS at 14:05

## 2019-01-01 RX ADMIN — Medication 120 MCG: at 11:54

## 2019-01-01 RX ADMIN — CEPHALEXIN 250 MG: 250 CAPSULE ORAL at 21:28

## 2019-01-01 RX ADMIN — MIDODRINE HYDROCHLORIDE 2.5 MG: 5 TABLET ORAL at 16:38

## 2019-01-01 RX ADMIN — Medication 2 G: at 18:35

## 2019-01-01 RX ADMIN — SODIUM CHLORIDE 500 ML: 900 INJECTION, SOLUTION INTRAVENOUS at 09:22

## 2019-01-01 RX ADMIN — LEVOTHYROXINE SODIUM 150 MCG: 150 TABLET ORAL at 08:08

## 2019-01-01 RX ADMIN — LEVOTHYROXINE SODIUM 150 MCG: 150 TABLET ORAL at 06:47

## 2019-01-01 RX ADMIN — SODIUM CHLORIDE, SODIUM LACTATE, POTASSIUM CHLORIDE, AND CALCIUM CHLORIDE 100 ML/HR: 600; 310; 30; 20 INJECTION, SOLUTION INTRAVENOUS at 13:39

## 2019-01-01 RX ADMIN — BACITRACIN: 500 OINTMENT TOPICAL at 08:16

## 2019-01-01 RX ADMIN — BACITRACIN: 500 OINTMENT TOPICAL at 17:09

## 2019-01-01 RX ADMIN — HEPARIN SODIUM 5000 UNITS: 5000 INJECTION INTRAVENOUS; SUBCUTANEOUS at 06:25

## 2019-01-01 RX ADMIN — TAMSULOSIN HYDROCHLORIDE 0.4 MG: 0.4 CAPSULE ORAL at 09:32

## 2019-01-01 RX ADMIN — BACITRACIN: 500 OINTMENT TOPICAL at 15:42

## 2019-01-01 RX ADMIN — LEVOTHYROXINE SODIUM 150 MCG: 150 TABLET ORAL at 07:36

## 2019-01-01 RX ADMIN — HEPARIN SODIUM 5000 UNITS: 1000 INJECTION, SOLUTION INTRAVENOUS; SUBCUTANEOUS at 11:39

## 2019-01-01 RX ADMIN — MEPIVACAINE HYDROCHLORIDE 150 MG: 20 INJECTION, SOLUTION EPIDURAL; INFILTRATION at 17:54

## 2019-01-01 RX ADMIN — Medication 10 ML: at 21:47

## 2019-01-01 RX ADMIN — BACITRACIN: 500 OINTMENT TOPICAL at 16:52

## 2019-01-01 RX ADMIN — Medication 2 G: at 11:04

## 2019-01-01 RX ADMIN — ALBUMIN (HUMAN) 12.5 G: 0.25 INJECTION, SOLUTION INTRAVENOUS at 00:18

## 2019-01-01 RX ADMIN — CEPHALEXIN 250 MG: 250 CAPSULE ORAL at 09:13

## 2019-01-01 RX ADMIN — LIDOCAINE HYDROCHLORIDE 20 MG: 20 INJECTION, SOLUTION EPIDURAL; INFILTRATION; INTRACAUDAL; PERINEURAL at 10:52

## 2019-01-01 RX ADMIN — METOPROLOL SUCCINATE 25 MG: 25 TABLET, EXTENDED RELEASE ORAL at 08:12

## 2019-01-01 RX ADMIN — PRAVASTATIN SODIUM 40 MG: 40 TABLET ORAL at 17:08

## 2019-01-01 RX ADMIN — Medication 10 ML: at 15:00

## 2019-01-01 RX ADMIN — CARVEDILOL 6.25 MG: 6.25 TABLET, FILM COATED ORAL at 18:27

## 2019-01-01 RX ADMIN — Medication 10 ML: at 10:11

## 2019-01-01 RX ADMIN — HEPARIN SODIUM 5000 UNITS: 5000 INJECTION INTRAVENOUS; SUBCUTANEOUS at 07:36

## 2019-01-01 RX ADMIN — ALBUMIN (HUMAN) 12.5 G: 0.25 INJECTION, SOLUTION INTRAVENOUS at 18:11

## 2019-01-01 RX ADMIN — PROPOFOL 30 MG: 10 INJECTION, EMULSION INTRAVENOUS at 10:52

## 2019-01-01 RX ADMIN — Medication 1 TABLET: at 09:15

## 2019-01-01 RX ADMIN — LEVOTHYROXINE SODIUM 150 MCG: 150 TABLET ORAL at 08:12

## 2019-01-01 RX ADMIN — BACITRACIN: 500 OINTMENT TOPICAL at 21:45

## 2019-01-01 RX ADMIN — LIDOCAINE HYDROCHLORIDE,EPINEPHRINE BITARTRATE 15 MG: 10; .01 INJECTION, SOLUTION INFILTRATION; PERINEURAL at 17:20

## 2019-01-01 RX ADMIN — Medication 10 ML: at 06:40

## 2019-01-01 RX ADMIN — CARVEDILOL 3.12 MG: 3.12 TABLET, FILM COATED ORAL at 08:26

## 2019-01-01 RX ADMIN — SODIUM CHLORIDE 100 ML: 900 INJECTION, SOLUTION INTRAVENOUS at 15:00

## 2019-01-01 RX ADMIN — FUROSEMIDE 40 MG: 10 INJECTION, SOLUTION INTRAMUSCULAR; INTRAVENOUS at 21:20

## 2019-01-01 RX ADMIN — BACITRACIN: 500 OINTMENT TOPICAL at 16:00

## 2019-01-01 RX ADMIN — HEPARIN SODIUM 5000 UNITS: 5000 INJECTION INTRAVENOUS; SUBCUTANEOUS at 18:02

## 2019-01-01 RX ADMIN — SODIUM CHLORIDE AND POTASSIUM CHLORIDE: 9; 1.49 INJECTION, SOLUTION INTRAVENOUS at 08:28

## 2019-01-01 RX ADMIN — TAMSULOSIN HYDROCHLORIDE 0.4 MG: 0.4 CAPSULE ORAL at 09:24

## 2019-01-01 RX ADMIN — FUROSEMIDE 40 MG: 10 INJECTION, SOLUTION INTRAMUSCULAR; INTRAVENOUS at 09:25

## 2019-01-01 RX ADMIN — PRAVASTATIN SODIUM 40 MG: 40 TABLET ORAL at 08:26

## 2019-01-01 RX ADMIN — CEPHALEXIN 250 MG: 250 CAPSULE ORAL at 09:24

## 2019-01-01 RX ADMIN — TAMSULOSIN HYDROCHLORIDE 0.4 MG: 0.4 CAPSULE ORAL at 09:13

## 2019-01-01 RX ADMIN — LEVOTHYROXINE SODIUM 150 MCG: 150 TABLET ORAL at 06:48

## 2019-01-01 RX ADMIN — Medication 10 ML: at 22:30

## 2019-01-01 RX ADMIN — DEXTROSE MONOHYDRATE AND SODIUM CHLORIDE 50 ML/HR: 5; .9 INJECTION, SOLUTION INTRAVENOUS at 14:59

## 2019-01-01 RX ADMIN — PRAVASTATIN SODIUM 40 MG: 40 TABLET ORAL at 09:24

## 2019-01-01 RX ADMIN — LEVOTHYROXINE SODIUM 150 MCG: 150 TABLET ORAL at 06:40
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
## 2019-02-22 PROBLEM — M54.9 BACK PAIN: Status: ACTIVE | Noted: 2019-01-01

## 2019-02-22 PROBLEM — I48.92 ATRIAL FLUTTER (HCC): Status: ACTIVE | Noted: 2019-01-01

## 2019-02-22 PROBLEM — I10 ACCELERATED HYPERTENSION: Status: ACTIVE | Noted: 2019-01-01

## 2019-02-22 PROBLEM — R55 SYNCOPE: Status: ACTIVE | Noted: 2019-01-01

## 2019-02-22 NOTE — WOUND CARE
WOCN Note:  
 
New consult placed for assessment of laceration to back of head. Patient fell prior to admission. Chart reviewed. Admitted DX:  Back pain [M54.9] Assessment:  
Patient is alert, communicative and moves all extremities, attempted to get of of bed but requires assistance for repositioning. Bed: total care Patient wearing briefs for incontinence. Patient reports no pain. Sacrum, heels and buttocks intact. 1.  Sacrum and buttocks have purple bruising from fall measuring 9 x 5 x 0 cm. 2.  Posterior head laceration, s/p fall within a area of ~ 2 x 2 x 0.1 cm; 100% pink; scant serosanguinous exudate; bruising to the area. 3.  Right anterior low leg, skin tear;  0.7 x 0.5 x 0.1 cm; 100% red; scant serosanguinous exudate; cleansed with saline and applied marathon. Recommendations: 1. Head laceration:  3 times per day apply Bacitracin. Skin Care & Pressure Prevention: 
Minimize layers of linen/pads under patient to optimize support surface. Turn/reposition approximately every 2 hours and offload heels. Manage incontinence / promote continence Discussed above plan with Cresencio LYONS. Transition of Care: Plan to follow as needed while admitted to hospital. 
 
CYNDI Norton RN [facility], [facility]. Wound Care Office [phone] Pager [phone]

## 2019-02-22 NOTE — PROGRESS NOTES
TRANSFER - IN REPORT: 
 
Verbal report received from Debbie(name) on Elisabeth Cordova  being received from ed(unit) for routine progression of care Report consisted of patients Situation, Background, Assessment and  
Recommendations(SBAR). Information from the following report(s) SBAR was reviewed with the receiving nurse. Opportunity for questions and clarification was provided. Assessment completed upon patients arrival to unit and care assumed.

## 2019-02-22 NOTE — PROGRESS NOTES
Pt is oriented to name, disoriented to time place and situation. Per family baseline is a/ox's 4. Pt is in afib ranging 70's to 130's. Follows commands, hard of hearing, hearing aid left ear. C/o 5-6 lower back pain, denies h/a and chest pain or pressure.

## 2019-02-22 NOTE — ROUTINE PROCESS
TRANSFER - IN REPORT: 
 
Verbal report received from Zhou(name) on Juan   being received from [facility]) for routine progression of care Report consisted of patients Situation, Background, Assessment and  
Recommendations(SBAR). Information from the following report(s) SBAR was reviewed with the receiving nurse. Opportunity for questions and clarification was provided. Assessment completed upon patients arrival to unit and care assumed.

## 2019-02-22 NOTE — PROGRESS NOTES
Received page from nurse,pt in afib and tachy. He does not have h/o afib. EKG was sinus in the ED. Obtain stat EKG. Transfer to tele/imcu as he may need iv push and gtts.  
 
Tonio Estrella MD

## 2019-02-22 NOTE — Clinical Note
Device pre-deployment testing conducted and deployed and released. The device was tested and analyzed for deployment.

## 2019-02-22 NOTE — ED PROVIDER NOTES
80 y.o. male with past medical history significant for Hypertension, GERD, Elevated cholesterol, DJD, Prostate CA, and Hyperparathyroidism who presents via EMS from private residence with chief complaint of evaluation after GLF. Patient states he was ambulating with a walker in the hallway of his house when he suddenly felt dizzy and lost his balance which resulted in a GLF. Per daughter, the patient fell backward onto a coated concrete floor and landed on his posterior head. Patient endorses head trauma, but denies LOC. Patient reports immediate onset of a small laceration with active bleeding to the posterior of his head. Patient presents to [facility] ED via EMS with the small laceration to the posterior head that is not actively bleeding with associated pain rated as 5/10 in severity, as well as lower back pain. Patient reports accompanying neck pain with positional movement that is seen at baseline. Per daughter, the patient ambulates with the assistance of a walker at baseline. Patient lives with his daughter at a private residence. Patient denies taking blood thinners. Pt denies fever, chills, cough, congestion, shortness of breath, chest pain, abdominal pain, nausea, vomiting, diarrhea, difficulty with urination or dysuria. There are no other acute medical concerns at this time. PCP: Oscar Houser MD 
 
Note written by Manish Benavides, as dictated by Mal Lima MD 9:13 AM  
 
 
 
The history is provided by the patient and a relative. Past Medical History:  
Diagnosis Date  Cancer [facility])   
 prostate  DJD (degenerative joint disease)  Elevated cholesterol  GERD (gastroesophageal reflux disease)  Hyperparathyroidism, primary ([facility] [address]  Hypertension  Obstructive sleep apnea   
 uses cpap  Thyroid disease HX of Graves Disease Past Surgical History:  
Procedure Laterality Date  HX HEENT    
 thyroidectomy  HX HEENT    
 tonsilectomy [address]  HX ORTHOPAEDIC    
 right femoral neck fracture  HX ORTHOPAEDIC    
 bilateral ankle surgery  HX ORTHOPAEDIC  12/12/12 LEFT ANKLE REFUSION AND REMOVAL POSTERIOR SCREW  
 MA COLONOSCOPY FLX DX W/COLLJ SPEC WHEN PFRMD  12/11/2006 Dr Terry Howard  repeat 12/2011  MA PROSTATE BIOPSY, NEEDLE, SATURATION SAMPLING Family History:  
Problem Relation Age of Onset  Hypertension Mother  Heart Disease Father CAD  Heart Disease Brother Social History Socioeconomic History  Marital status:  Spouse name: Not on file  Number of children: Not on file  Years of education: Not on file  Highest education level: Not on file Social Needs  Financial resource strain: Not on file  Food insecurity - worry: Not on file  Food insecurity - inability: Not on file  Transportation needs - medical: Not on file  Transportation needs - non-medical: Not on file Occupational History  Not on file Tobacco Use  Smoking status: Never Smoker  Smokeless tobacco: Never Used Substance and Sexual Activity  Alcohol use: Yes Comment: rare  Drug use: No  
 Sexual activity: Not on file Other Topics Concern  Not on file Social History Narrative  Not on file ALLERGIES: Patient has no known allergies. Review of Systems Constitutional: Negative for appetite change, chills and fever. HENT: Negative for congestion, rhinorrhea, sore throat and trouble swallowing. Eyes: Negative for photophobia. Respiratory: Negative for cough and shortness of breath. Cardiovascular: Negative for chest pain and palpitations. Gastrointestinal: Negative for abdominal pain, nausea and vomiting. Genitourinary: Negative for difficulty urinating, dysuria, frequency and hematuria. Musculoskeletal: Positive for back pain, gait problem and neck pain. Negative for arthralgias. Skin: Positive for wound. Neurological: Positive for dizziness. Negative for syncope and weakness. Psychiatric/Behavioral: Negative for behavioral problems. The patient is not nervous/anxious. All other systems reviewed and are negative. There were no vitals filed for this visit. Physical Exam  
Constitutional: He is oriented to person, place, and time. He appears well-developed and well-nourished. No distress. HENT:  
Head:  
 
 
Hard of hearing, hearing aid left ear. Poor dentition. Eyes: Conjunctivae and EOM are normal.  
Neck: Normal range of motion. Neck supple. Cardiovascular: Normal rate, regular rhythm and normal heart sounds. Pulmonary/Chest: Effort normal and breath sounds normal. No respiratory distress. Abdominal: Soft. Bowel sounds are normal. He exhibits no distension. There is no tenderness. Musculoskeletal: Normal range of motion. Neurological: He is alert and oriented to person, place, and time. Skin: Skin is warm and dry. Psychiatric: He has a normal mood and affect. His behavior is normal. Judgment and thought content normal.  
Nursing note and vitals reviewed. Note written by Manish Levi, as dictated by Nikki Amin MD 9:12 AM 
  
 
MDM Procedures ED EKG interpretation: 
Rhythm: normal sinus rhythm and PAC's; and irregular. Rate (approx.): 70; Axis: normal; P wave: normal; QRS interval: normal ; ST/T wave: non-specific changes; in  Lead: ; Other findings: . This EKG was interpreted by Agustin Hicks MD,ED Provider. 10:30 AM 
 
PROGRESS NOTE: 
10:44 AM Provider with patient and daughter at bedside. Daughter stated that they have a video camera in their home, and after reviewing the film today it looks as if the patient may have fainted while ambulating in the hallway and appeared to \"hit his head really hard. \" PROGRESS NOTE: 
11:15 AM Provider prepped wound and there is a complex deep abrasion with the edges well approximated. Annjde Babinski would delay healing rather than be helpful. PROGRESS NOTE: 
11:18 AM Patient's XR spine lumb showed possible new compression deformities of the L1 and L2 vertebral bodies since 12/13/2018. Provider back with patient and daughter at bedside reviewing imaging. Daughter notes she forgot to mention the patient has had decreased PO intake over the past few days. CONSULT NOTE: 
11:23 AM Radha Dan MD communicated with Dr. Kaylyn Rodriguez, Consult for Hospitalist via [facility] Text. Discussed available diagnostic tests and clinical findings. Dr. Kaylyn Rodriguez will evaluate patient for possible admission. Dr. Kaylyn Rodriguez will admit patient.

## 2019-02-22 NOTE — PROGRESS NOTES
Admission Medication Reconciliation: 
 
Information obtained from:  interview with patient's wife and daughter Comments/Recommendations:  **INCOMPLETE** Please see new note. Spoke with patient's wife and daughter regarding Ron Locke medications. The daughter stated that she gives her father his medications and that he takes 4 meds in the evening. The only meds she was sure of were Synthroid 150mcg and that he takes potassium. He takes all of the meds in the evening together. Spouse states that he is having s/sx of hypothyroidism. Recommend checking TSH level. Daughter stated that it would be best to contact Dr. Lennox Agrawal at [phone] for accurate med list but are closed for lunch. The following changes were made to the PTA medication list: 1. Meds Added: none 2. Meds Removed: ASA, vitamin D, and multivitamin 3. Meds Adjusted: none Allergies and reactions were verified with the patient's wife. Allergies:  Patient has no known allergies. Chief Complaint for this Admission: Chief Complaint Patient presents with  Fall  Dizziness Significant PMH/Disease States:  
Past Medical History:  
Diagnosis Date  Cancer [facility])   
 prostate  DJD (degenerative joint disease)  Elevated cholesterol  GERD (gastroesophageal reflux disease)  Hyperparathyroidism, primary ([facility] [address]  Hypertension  Obstructive sleep apnea   
 uses cpap  Thyroid disease HX of Graves Disease Prior to Admission Medications:  
Prior to Admission Medications Prescriptions Last Dose Informant Patient Reported? Taking?  
levothyroxine (SYNTHROID) 150 mcg tablet 2/21/2019 at pm  No Yes Sig: Take 1 Tab by mouth Daily (before breakfast). Must have made doctor appointment for refill. lisinopril (PRINIVIL, ZESTRIL) 20 mg tablet Unknown at Unknown time  No No  
Sig: Take 1 Tab by mouth daily. Must have made doctor appointment for refill. potassium chloride (K-DUR, KLOR-CON) 10 mEq tablet 2/21/2019 at pm  No Yes Sig: Take 1 Tab by mouth daily. Must have made doctor appointment for refill. pravastatin (PRAVACHOL) 40 mg tablet Unknown at Unknown time  No No  
Sig: Take 1 Tab by mouth daily. Must have made doctor appointment for refill.  
tamsulosin (FLOMAX) 0.4 mg capsule Unknown at Unknown time  No No  
Sig: Take 1 Cap by mouth daily. Must have made doctor appointment for refill. Facility-Administered Medications: None Thank you for allowing me to participate in this patient's care. Please call the main pharmacy at  or the [facility] pharmacist at  with any questions. Sign-off: Sanjiv Garcia, 2019 PharmD Candidate

## 2019-02-22 NOTE — ROUTINE PROCESS
TRANSFER - OUT REPORT: 
 
Verbal report given to ELOY Dupont on Umair Seeds  being transferred to [phone]  for routine progression of care Report consisted of patients Situation, Background, Assessment and  
Recommendations(SBAR). Information from the following report(s) SBAR, ED Summary, Procedure Summary, Intake/Output and MAR was reviewed with the receiving nurse. Lines:  
Peripheral IV 02/22/19 Left Antecubital (Active) Site Assessment Clean, dry, & intact 2/22/2019  9:29 AM  
Phlebitis Assessment 0 2/22/2019  9:29 AM  
Infiltration Assessment 0 2/22/2019  9:29 AM  
Dressing Status Clean, dry, & intact 2/22/2019  9:29 AM  
  
 
Opportunity for questions and clarification was provided.    
 
Patient transported with:

## 2019-02-22 NOTE — Clinical Note
Sheath #all: Dressed using 4 X 4 and transparent dressing. Site: clean, dry, & intact, no bleeding and no hematoma.

## 2019-02-22 NOTE — PROGRESS NOTES
1515: TRANSFER - IN REPORT: 
 
Verbal report received from Cresencio(name) on Lobito Acosta  being received from 5W(unit) for urgent transfer Report consisted of patients Situation, Background, Assessment and  
Recommendations(SBAR). Information from the following report(s) SBAR, Kardex, Intake/Output, MAR, Recent Results and Cardiac Rhythm A-Fib was reviewed with the receiving nurse. Opportunity for questions and clarification was provided. Assessment completed upon patients arrival to unit and care assumed.

## 2019-02-22 NOTE — PROGRESS NOTES
Admission Medication Reconciliation: 
 
Information obtained from:  Dr. Karina Armstrong office - [phone] Comments/Recommendations:  
Patient's med list as of 10/2018 was supposed to include ASA 81mg every day, vitamin D 1000 units every day, but patient's daughter states that she is not giving those meds. The following changes were made to the PTA medication list: 1. Meds Added: ASA 2. Meds Removed: Gelnique, multivitamin 3. Meds Adjusted: - Klor-Con 10mEq every day was increased to 20mEq every day - Synthroid 150mcg every day was decreased to 125mcg every day Allergies:  Patient has no known allergies. Chief Complaint for this Admission: Chief Complaint Patient presents with  Fall  Dizziness Significant PMH/Disease States:  
Past Medical History:  
Diagnosis Date  Cancer [facility])   
 prostate  DJD (degenerative joint disease)  Elevated cholesterol  GERD (gastroesophageal reflux disease)  Hyperparathyroidism, primary ([address]  Hypertension  Obstructive sleep apnea   
 uses cpap  Thyroid disease HX of Graves Disease Prior to Admission Medications:  
Prior to Admission Medications Prescriptions Last Dose Informant Patient Reported? Taking?  
aspirin delayed-release 81 mg tablet Not Taking at Unknown time Other Yes No  
Sig: Take 81 mg by mouth daily. cholecalciferol (VITAMIN D3) 1,000 unit tablet Not Taking at Unknown time Other Yes No  
Sig: Take 1,000 Units by mouth daily. levothyroxine (SYNTHROID) 125 mcg tablet   Yes Yes Sig: Take 125 mcg by mouth daily. lisinopril (PRINIVIL, ZESTRIL) 20 mg tablet Unknown at Unknown time Other No No  
Sig: Take 1 Tab by mouth daily. Must have made doctor appointment for refill. potassium chloride (KLOR-CON M20) 20 mEq tablet 2/21/2019 at pm  Yes Yes Sig: Take 20 mEq by mouth daily.   
pravastatin (PRAVACHOL) 40 mg tablet Unknown at Unknown time Other No No  
 Sig: Take 1 Tab by mouth daily. Must have made doctor appointment for refill.  
tamsulosin (FLOMAX) 0.4 mg capsule Unknown at Unknown time Other No No  
Sig: Take 1 Cap by mouth daily. Must have made doctor appointment for refill. Facility-Administered Medications: None Thank you for allowing me to participate in this patient's care. Please call the main pharmacy at  or the [facility] pharmacist at  with any questions. Sign-off: Juan Rai, 2019 PharmD Candidate

## 2019-02-23 NOTE — H&P
H&P dictation [id number] Dx: mechanical fall with ? LOC New onset a flutter. PE ruled out Accelerated hypertension Full code Linette Palafox MD

## 2019-02-23 NOTE — PROGRESS NOTES
Hospitalist Progress Note Mei López MD 
Answering service: [phone] from in house phone Cell: [phone] Date of Service:  2019 NAME:  Julissa Rodriguez :  3/12/1931 MRN:  [id number] Admission Summary: An 59-year-old gentleman with a significant history of hypertension, hyperlipidemia, hyperparathyroidism, prostate cancer, and pituitary gland disease, presented to the emergency room after a fall. He fell after a dizzy spell on a concrete floor on his back. He bled from his posterior scalp. He is not sure about loss of consciousness. Most of the history was from his wife and daughter. The patient is hard of hearing. In the ED, he was evaluated with a CAT scan of the head that was negative for an acute intracranial abnormality. He also underwent CAT scan of the cervical spine that showed no acute abnormality. Lumbar spine CT showed mildly displaced fracture of the anterior inferior corner of L1 with a thin fracture extending into the superior endplate and multilevel spondylosis. While he was admitted to the medical floor and while he was on a remote telemonitor, it was detected the patient developed atrial fibrillation. Heart rate was in 120s briefly, but was down to up to 60s. He did not require any rate control medicine. A stat EKG was done that did not capture atrial fibrillation or atrial flutter and his blood pressure was high and he was moved to Intermediate Care Unit. Due to new-onset atrial fibrillation/flutter without any clear precipitating factor, would like to rule out pulmonary embolism and an acute pulmonary disease. A CT angiogram of the chest was obtained, which was negative for blood clots. The patient was moved from the medical to [facility] bed, as he is anticipated to require recurrent blood pressure check.   For most of the day, the patient's blood pressure initially was 166/75, subsequently stayed in the 180s, came down to 168 and had a one time dose of clonidine 2 p.m. and his blood pressure has now come down briefly was 85/62 but correct one was 141/84, so the most recent blood pressure at this time of dictation is 140/84; since his heart rate is slowing down, the Lopressor that was ordered was discontinued. I have consulted Cardiology, who has already seen the patient. He had undergone echocardiogram, which is not reported yet. The patient reassessed on the unit. He has history of cervical spine surgery by Spine Surgery and lower back by Orthopedic Surgery. Interval history / Subjective:  
  F/u fall Just wok up, confused Back pain on movement Assessment & Plan:  
 
Fall, questionable loss of consciousness. 
-The patient falls frequently at home, reportedly   
-CT head 2/22 unremarkable 
-Xray lumbar spine 2/22 Possible new compression deformities of the L1 and L2 vertebral bodies 
-CT lumbar spine 2/22 Mildly displaced fracture of the anterior inferior corner of L1. A thin fracture extension extends to the superior endplate as well 
-PT/OT 
-Awaiting Ortho Questionable new onset atrial flutter/fibrillation 
-?tachybrady syndrome 
-currently sinus  
-Appreciate Cardiology, may need pacemaker AMS 
-?acute hospital delirium 
-According to the daughter the patient does not have a diagnosis of Dementia. Does not forget except for occasional forgetfulness of day 
-CT head unremarkable 
-Will recheck the patient, later in the day and if still confused will consult Neurology Uncontrolled hypertension -now improved 
-monitor Scalp laceration is minimal 
-wound care. Hypothyroidism 
-on synthroid -TSH elevated, will repeat tomorrow History of prostate cancer Regular diet Code status: Partial CODE 
DVT prophylaxis: scd PTA: home Plan: Follow Cardiology, Neurosurgery Care Plan discussed with: Patient/Family [facility] Problems  Date Reviewed: 4/21/2016 Codes Class Noted POA Back pain ICD-10-CM: M54.9 ICD-9-CM: 724.5  2/22/2019 Unknown Atrial flutter ([facility] [address] ICD-10-CM: I88.54 
ICD-9-CM: 427.32  2/22/2019 Unknown Syncope ICD-10-CM: R55 
ICD-9-CM: 780.2  2/22/2019 Unknown Accelerated hypertension ICD-10-CM: I10 
ICD-9-CM: 401.0  2/22/2019 Unknown Fall ICD-10-CM: W19. Amye Del ICD-9-CM: E888.9  2/22/2019 Unknown Review of Systems: A comprehensive review of systems was negative except for that written in the HPI. Vital Signs:  
 Last 24hrs VS reviewed since prior progress note. Most recent are: 
Visit Vitals /62 Pulse 65 Temp 98.4 °F (36.9 °C) Resp 18 Ht 6' 2\" (1.88 m) Wt 98.8 kg (217 lb 13 oz) SpO2 98% BMI 27.97 kg/m² No intake or output data in the 24 hours ending 02/23/19 0837 Physical Examination:  
 
 
     
Constitutional:  No acute distress, cooperative, pleasant   
ENT:  Oral mucous moist, oropharynx benign. Neck supple, Resp:  CTA bilaterally. No wheezing/rhonchi/rales. No accessory muscle use CV:  Regular rhythm, normal rate, no murmurs, gallops, rubs GI:  Soft, non distended, non tender. normoactive bowel sounds, no hepatosplenomegaly Musculoskeletal:  No edema, warm, 2+ pulses throughout Neurologic:  Moves all extremities. AAOx0, CN II-XII reviewed Skin:  Good turgor, no rashes or ulcers Data Review:  
 Review and/or order of clinical lab test 
 
 
Labs:  
 
Recent Labs  
  02/22/19 
1467 WBC 5.7 HGB 12.5 HCT 39.5  Recent Labs  
  02/23/19 
0228 02/22/19 
3462  137  
K 4.2 3.8 * 106 CO2 20* 27 BUN 18 23* CREA 0.94 1.20 GLU 95 101* CA 10.0 11.0* Recent Labs  
  02/22/19 
2155 SGOT 25 ALT 19  
* TBILI 1.3* TP 8.9* ALB 3.2*  
GLOB 5.7* Recent Labs  
  02/23/19 
0784 INR 1.1 PTP 10.9 No results for input(s): FE, TIBC, PSAT, FERR in the last 72 hours. Lab Results Component Value Date/Time Folate 18.6 02/22/2019 09:18 AM  
  
No results for input(s): PH, PCO2, PO2 in the last 72 hours. Recent Labs  
  02/22/19 
5776 TROIQ <0.05 Lab Results Component Value Date/Time Cholesterol, total 143 03/22/2016 10:21 AM  
 HDL Cholesterol 53 03/22/2016 10:21 AM  
 LDL, calculated 78 03/22/2016 10:21 AM  
 Triglyceride 60 03/22/2016 10:21 AM  
 CHOL/HDL Ratio 2.7 06/23/2010 09:08 AM  
 
Lab Results Component Value Date/Time Glucose (POC) 105 (H) 02/23/2019 06:29 AM  
 Glucose (POC) 97 12/12/2012 10:06 AM  
 
Lab Results Component Value Date/Time Color DARK YELLOW 12/13/2018 01:18 PM  
 Appearance CLEAR 12/13/2018 01:18 PM  
 Specific gravity 1.021 12/13/2018 01:18 PM  
 pH (UA) 6.0 12/13/2018 01:18 PM  
 Protein 30 (A) 12/13/2018 01:18 PM  
 Glucose NEGATIVE  12/13/2018 01:18 PM  
 Ketone TRACE (A) 12/13/2018 01:18 PM  
 Bilirubin NEGATIVE  10/10/2018 09:54 AM  
 Urobilinogen >8.0 (H) 12/13/2018 01:18 PM  
 Nitrites NEGATIVE  12/13/2018 01:18 PM  
 Leukocyte Esterase NEGATIVE  12/13/2018 01:18 PM  
 Epithelial cells FEW 12/13/2018 01:18 PM  
 Bacteria NEGATIVE  12/13/2018 01:18 PM  
 WBC 0-4 12/13/2018 01:18 PM  
 RBC 0-5 12/13/2018 01:18 PM  
 
 
 
Medications Reviewed:  
 
Current Facility-Administered Medications Medication Dose Route Frequency  sodium chloride (NS) flush 5-40 mL  5-40 mL IntraVENous Q8H  
 sodium chloride (NS) flush 5-40 mL  5-40 mL IntraVENous PRN  
 sodium chloride (NS) flush 5-40 mL  5-40 mL IntraVENous Q8H  
 sodium chloride (NS) flush 5-40 mL  5-40 mL IntraVENous PRN  
 acetaminophen (TYLENOL) tablet 650 mg  650 mg Oral Q4H PRN  
 HYDROcodone-acetaminophen (NORCO) 5-325 mg per tablet 1 Tab  1 Tab Oral Q4H PRN  
 morphine injection 1 mg  1 mg IntraVENous Q4H PRN  
  naloxone (NARCAN) injection 0.4 mg  0.4 mg IntraVENous PRN  
 ondansetron (ZOFRAN ODT) tablet 4 mg  4 mg Oral Q4H PRN  
 bisacodyl (DULCOLAX) tablet 5 mg  5 mg Oral DAILY PRN  
 0.9% sodium chloride with KCl 20 mEq/L infusion   IntraVENous CONTINUOUS  
 pravastatin (PRAVACHOL) tablet 40 mg  40 mg Oral DAILY  tamsulosin (FLOMAX) capsule 0.4 mg  0.4 mg Oral DAILY  levothyroxine (SYNTHROID) tablet 150 mcg  150 mcg Oral ACB  dilTIAZem (CARDIZEM) injection 25 mg  25 mg IntraVENous ONCE PRN  
 hydrALAZINE (APRESOLINE) 20 mg/mL injection 10 mg  10 mg IntraVENous Q6H PRN  
 bacitracin 500 unit/gram ointment   Topical TID  lisinopril (PRINIVIL, ZESTRIL) tablet 20 mg  20 mg Oral DAILY  
 
______________________________________________________________________ EXPECTED LENGTH OF STAY: - - - 
ACTUAL LENGTH OF STAY:          1 Dandre Real MD

## 2019-02-23 NOTE — PROGRESS NOTES
Problem: Pressure Injury - Risk of 
Goal: *Prevention of pressure injury Document Jayro Scale and appropriate interventions in the flowsheet. Outcome: Progressing Towards Goal 
Pressure Injury Interventions: 
Sensory Interventions: Assess changes in LOC, Check visual cues for pain, Discuss PT/OT consult with provider, Float heels, Maintain/enhance activity level, Monitor skin under medical devices, Minimize linen layers, Turn and reposition approx. every two hours (pillows and wedges if needed) Moisture Interventions: Check for incontinence Q2 hours and as needed Activity Interventions: PT/OT evaluation Mobility Interventions: Pressure redistribution bed/mattress (bed type), PT/OT evaluation Nutrition Interventions: Offer support with meals,snacks and hydration Friction and Shear Interventions: Lift sheet, Minimize layers

## 2019-02-23 NOTE — PROGRESS NOTES
Spiritual Care Partner Volunteer visited patient in Rm 407 on 2/23/19. Documented by: Chaplain Duncan MDiv, MACE 
[phone] PRAY ([id number])

## 2019-02-23 NOTE — PROGRESS NOTES
Physical Therapy 2.23.19 Order received, chart reviewed. CT of lumbar spine revealed mildly displaced fracture of the anterior inferior corner of L1 and a thin fracture extension extends to the superior endplate. Await ortho recs regarding POC prior to initiating mobility. F/u later vs tomorrow for PT evaluation. Thank you.  
 
Laly Gomez, PT, DPT

## 2019-02-23 NOTE — CONSULTS
81yo s/p fall with L1 fracture on CT, minimally displaced. Recommend brace when out of bed (\"off the shelf\" lumbar brace from spine floor is sufficient). He can follow up with me in 4 weeks as outpatient for xrays.   I would expect this fracture to take 8-10 weeks to heal.

## 2019-02-23 NOTE — PROGRESS NOTES
Problem: Falls - Risk of 
Goal: *Absence of Falls Document Lisa Fearing Fall Risk and appropriate interventions in the flowsheet. Fall Risk Interventions: 
 
History of Falls Interventions: Bed/chair exit alarm Bedside shift change report given to Edinson LYONS (oncoming nurse) by Robb Crews RN (offgoing nurse). Report included the following information SBAR, Kardex, Intake/Output, MAR, Accordion and Recent Results.

## 2019-02-23 NOTE — H&P
[address] HISTORY AND PHYSICAL Name:  Edgar Tse 
MR#:  [id number] :  1931 ACCOUNT #:  [de-identified] ADMIT DATE:  2019 CHIEF COMPLAINT:  Ground-level fall. HISTORY OF PRESENT ILLNESS:  An 72-year-old gentleman with a significant history of hypertension, hyperlipidemia, hyperparathyroidism, prostate cancer, , presented to the emergency room after a fall. He fell after a dizzy spell on a concrete floor on his back. He bled from his posterior scalp. He is not sure about loss of consciousness. Most of the history was from his wife and daughter. The patient is hard of hearing. In the ED, he was evaluated with a CAT scan of the head that was negative for an acute intracranial abnormality. He also underwent CAT scan of the cervical spine that showed no acute abnormality. Lumbar spine CT showed mildly displaced fracture of the anterior inferior corner of L1 with a thin fracture extending into the superior endplate and multilevel spondylosis. While he was admitted to the medical floor and while he was on a remote telemonitor, it was detected the patient developed atrial fibrillation. Heart rate was in 120s briefly, but was down to  60s. He did not require any rate control medicine. A stat EKG was done that did not capture atrial fibrillation or atrial flutter and his blood pressure was high and he was moved to Intermediate Care Unit. Due to new-onset atrial fibrillation/flutter without any clear precipitating factor, would like to rule out pulmonary embolism and an acute pulmonary disease. A CT angiogram of the chest was obtained, which was negative for blood clots.   The patient was moved from the medical to [facility] bed, as he may likely require frequent  blood pressure,Iv antihypertensives  The patient's blood pressure initially was 166/75, subsequently stayed in the 180s, came down to 168 and had a one time dose of clonidine 2 p.m. BP briefly was 85/62 but  Repeat one was 141/84, so the most recent blood pressure at this time of dictation is 140/84; since his heart rate is slowing down, the Lopressor that was ordered was discontinued. I have consulted Cardiology, who has already seen the patient. He had undergone echocardiogram, which is not reported yet. The patient reassessed on the unit. MEDICATIONS: 
Medication Documentation Review Audit Reviewed by Kenzie Tran RN (Registered Nurse) on 02/22/19 at 8261 Medication Sig Documenting Provider Last Dose Status Taking?  
aspirin delayed-release 81 mg tablet Take 81 mg by mouth daily. Provider, Historical Not Taking Unknown time Active No  
cholecalciferol (VITAMIN D3) 1,000 unit tablet Take 1,000 Units by mouth daily. Provider, Historical Not Taking Unknown time Active No  
levothyroxine (SYNTHROID) 125 mcg tablet Take 125 mcg by mouth daily. Provider, Historical 2/21/2019 2200 Active Yes  
lisinopril (PRINIVIL, ZESTRIL) 20 mg tablet Take 1 Tab by mouth daily. Must have made doctor appointment for refill. Scot Heard MD Not Taking Unknown time Active No  
potassium chloride (KLOR-CON M20) 20 mEq tablet Take 20 mEq by mouth daily. Provider, Historical 2/21/2019 pm Active Yes  
pravastatin (PRAVACHOL) 40 mg tablet Take 1 Tab by mouth daily. Must have made doctor appointment for refill. Scot Heard MD 2/21/2019 Unknown time Active Yes  
tamsulosin (FLOMAX) 0.4 mg capsule Take 1 Cap by mouth daily. Must have made doctor appointment for refill. Scot Heard MD 2/21/2019 2200 Active Yes ALLERGIES:  NO KNOWN ALLERGIES. SOCIAL HISTORY:  The patient walks with a walker. Lives with family. Never smoked. No alcohol or drugs. FAMILY HISTORY:  Positive for hypertension, coronary artery disease. REVIEW OF SYSTEMS:  Positive for dizziness, fall, laceration on the scalp. Negative for headache.   Negative for double vision, blurry vision, nausea, vomiting. No chest pain, pressure or palpitation. No diarrhea. No extremity deformity. PHYSICAL EXAMINATION: 
GENERAL:  Earlier when the patient was seen, the patient was hard of hearing, otherwise alert and oriented without any distress. HEENT:  Superficial laceration on the occipital scalp has stopped bleeding. No stitch per ED attending. LUNGS:  Clear air entry bilaterally. CARDIOVASCULAR:  S1 and S2 regular, well heard. No gallop or murmur. ABDOMEN:  Obese, soft, and nontender. EXTREMITIES:  No edema. CENTRAL NERVOUS SYSTEM:  Hard of hearing, alert, oriented. Nonfocal exam. 
 
ASSESSMENT AND PLAN: 
1. Fall, questionable loss of consciousness. Admit the patient to monitored unit. No evidence of stroke or acute cardiopulmonary issues. Pulmonary embolism is ruled out by now. Monitor the patient with a cardiac monitor. He does not have any history of syncope, presyncope, or seizure. Obtain echocardiogram.  Cardiology is consulted. 2.  Questionable new onset atrial flutter/fibrillation. Rate controlled. Discussed with Cardiology. No anticoagulation for now. Monitor. Lopressor was started, but discontinued as heart rate was dipping due to his blood pressure is now down. 3.  Uncontrolled hypertension likely due to pain and discomfort and stay in the  hospital.  Systolic blood pressure ranged in the 180s to 190s. He was administered 0.1 mg of Catapres around 2 p.m. and 25 mg of Lopressor at 5 p.m. He did not require any intravenous pain medicine and the blood pressure is now coming down out of proportion to what could have been expected from the Catapres or Lopressor. He will be continued with IV fluid support presently. His lisinopril will be on hold for now the blood pressure is low, may resume tomorrow. 4.  Scalp laceration is minimal and superficial wound care. His home medications will be continued to include levothyroxine. 5.  Code status, full. 6.  Prophylaxis, SCD. PLAN:  Plan of care discussed with the patient and his family. CONSULTATIONS:  Cardiology, Physical and Occupational therapy. Tong Snider MD 
 
 
[id number] 
D:  02/22/2019 20:31 T:  02/23/2019 0:05 JOB #:  [id number]

## 2019-02-23 NOTE — PROGRESS NOTES
Problem: Falls - Risk of 
Goal: *Absence of Falls Document Paula Kang Fall Risk and appropriate interventions in the flowsheet. Outcome: Progressing Towards Goal 
Fall Risk Interventions: 
Mobility Interventions: Bed/chair exit alarm Mentation Interventions: Door open when patient unattended, Bed/chair exit alarm Elimination Interventions: Bed/chair exit alarm History of Falls Interventions: Bed/chair exit alarm

## 2019-02-23 NOTE — CDMP QUERY
Pt admitted with fall and A fib /Pt noted to have AMS If possible, please document in the progress notes and d/c summary if you are evaluating and / or treating any of the following: Metabolic Encephalopathy Hypertensive Encephalopathy Anoxic Encephalopathy Encephalopathy due to medications or drugs (please specify) Toxic Metabolic Encephalopathy Wernickes Encephalopathy Other Encephalopathy Other, please specify Clinically unable to determine The medical record reflects the following: 
   Risk Factors: HTN, a fib Clinical Indicators: 2/22-nurses note Pt is oriented to name, disoriented to time place and situation. Per family baseline is a/ox's 4. Pt is in afib ranging 70's to 130's. Follows commands, hard of hearing, hearing aid left ear. C/o 5-6 lower back pain, denies h/a and chest pain or pressure Treatment: cardizem, high fall risk precautions Thank you, 
       Rodrick Garcia [address] [place] [address]

## 2019-02-23 NOTE — PROGRESS NOTES
Cardiology Progress Note 2/23/2019 12:02 PM 
 
Admit Date: 2/22/2019 Admit Diagnosis: Back pain [M54.9]; Fall [W19. [place]; Syncope [R55]; Atrial flutter ([facility] [I48.92]; Accelerated hypertension [I10] Subjective:  
 
Lisa Cross has hard hearing but no hearing aid available with him today His daughter helps with questions She said he has had falls before this admission AFIB flutter is new to her and him He has no complaints right now Past Medical History:  
Diagnosis Date  Cancer [facility])   
 prostate  DJD (degenerative joint disease)  Elevated cholesterol  GERD (gastroesophageal reflux disease)  Hyperparathyroidism, primary ([facility]  Hypertension  Obstructive sleep apnea   
 uses cpap  Thyroid disease HX of Graves Disease Past Surgical History:  
Procedure Laterality Date  HX HEENT    
 thyroidectomy  HX HEENT    
 tonsilectomy [address] [address]  HX ORTHOPAEDIC    
 right femoral neck fracture  HX ORTHOPAEDIC    
 bilateral ankle surgery  HX ORTHOPAEDIC  12/12/12 LEFT ANKLE REFUSION AND REMOVAL POSTERIOR SCREW  
 NM COLONOSCOPY FLX DX W/COLLJ SPEC WHEN PFRMD  12/11/2006 Dr Pedro Garcia  repeat 12/2011  NM PROSTATE BIOPSY, NEEDLE, SATURATION SAMPLING Social History Socioeconomic History  Marital status:  Spouse name: Not on file  Number of children: Not on file  Years of education: Not on file  Highest education level: Not on file Social Needs  Financial resource strain: Not on file  Food insecurity - worry: Not on file  Food insecurity - inability: Not on file  Transportation needs - medical: Not on file  Transportation needs - non-medical: Not on file Occupational History  Not on file Tobacco Use  Smoking status: Never Smoker  Smokeless tobacco: Never Used Substance and Sexual Activity  Alcohol use: Yes Comment: rare  Drug use: No  
 Sexual activity: Not on file Other Topics Concern  Not on file Social History Narrative  Not on file Visit Vitals /78 (BP 1 Location: Right arm, BP Patient Position: At rest) Pulse 70 Temp 99.2 °F (37.3 °C) Resp 23 Ht 6' 2\" (1.88 m) Wt 217 lb 13 oz (98.8 kg) SpO2 97% BMI 27.97 kg/m² Current Facility-Administered Medications Medication Dose Route Frequency  sodium chloride (NS) flush 5-40 mL  5-40 mL IntraVENous Q8H  
 sodium chloride (NS) flush 5-40 mL  5-40 mL IntraVENous PRN  
 sodium chloride (NS) flush 5-40 mL  5-40 mL IntraVENous Q8H  
 sodium chloride (NS) flush 5-40 mL  5-40 mL IntraVENous PRN  
 acetaminophen (TYLENOL) tablet 650 mg  650 mg Oral Q4H PRN  
 HYDROcodone-acetaminophen (NORCO) 5-325 mg per tablet 1 Tab  1 Tab Oral Q4H PRN  
 morphine injection 1 mg  1 mg IntraVENous Q4H PRN  
 naloxone (NARCAN) injection 0.4 mg  0.4 mg IntraVENous PRN  
 ondansetron (ZOFRAN ODT) tablet 4 mg  4 mg Oral Q4H PRN  
 bisacodyl (DULCOLAX) tablet 5 mg  5 mg Oral DAILY PRN  pravastatin (PRAVACHOL) tablet 40 mg  40 mg Oral DAILY  tamsulosin (FLOMAX) capsule 0.4 mg  0.4 mg Oral DAILY  levothyroxine (SYNTHROID) tablet 150 mcg  150 mcg Oral ACB  dilTIAZem (CARDIZEM) injection 25 mg  25 mg IntraVENous ONCE PRN  
 hydrALAZINE (APRESOLINE) 20 mg/mL injection 10 mg  10 mg IntraVENous Q6H PRN  
 bacitracin 500 unit/gram ointment   Topical TID  lisinopril (PRINIVIL, ZESTRIL) tablet 20 mg  20 mg Oral DAILY Objective:  
  
Physical Exam: 
Visit Vitals /78 (BP 1 Location: Right arm, BP Patient Position: At rest) Pulse 70 Temp 99.2 °F (37.3 °C) Resp 23 Ht 6' 2\" (1.88 m) Wt 217 lb 13 oz (98.8 kg) SpO2 97% BMI 27.97 kg/m² General Appearance:  Well developed, well nourished,alert and individual in no acute distress. Ears/Nose/Mouth/Throat:   Hearing (difficult) Neck: Supple. Chest:   Lungs clear to auscultation bilaterally. Cardiovascular:  irregular rhythm, 2/6 systolic LSB murmur. Abdomen:   Soft, non-tender Extremities: No edema bilaterally. Skin: Warm and dry. Data Review:  
Labs:   
Recent Results (from the past 24 hour(s)) EKG, 12 LEAD, INITIAL Collection Time: 02/22/19  2:41 PM  
Result Value Ref Range Ventricular Rate 81 BPM  
 Atrial Rate 81 BPM  
 P-R Interval 216 ms  
 QRS Duration 110 ms  
 Q-T Interval 382 ms QTC Calculation (Bezet) 443 ms Calculated P Axis -13 degrees Calculated R Axis 24 degrees Calculated T Axis 55 degrees Diagnosis Sinus rhythm with 1st degree AV block with frequent premature ventricular  
complexes Nonspecific ST and T wave abnormality Abnormal ECG When compared with ECG of 22-FEB-2019 09:15, 
premature supraventricular complexes are no longer present DE interval has increased Nonspecific T wave abnormality, worse in Inferior leads Confirmed by Henrik Vasquez MD, [facility] ([id number]) on 2/22/2019 4:55:19 PM 
  
ECHO ADULT COMPLETE Collection Time: 02/22/19  3:40 PM  
Result Value Ref Range LV E' Lateral Velocity 9.38 cm/s LV E' Septal Velocity 4.94 cm/s Ao Root D 4.49 cm Aortic Valve Systolic Peak Velocity 555.30 cm/s AoV VTI 56.57 cm AoV PG 33.2 mmHg LVIDd 4.75 4.2 - 5.9 cm  
 LVPWd 1.20 (A) 0.6 - 1.0 cm LVIDs 3.57 cm IVSd 1.17 (A) 0.6 - 1.0 cm Left Atrium to Aortic Root Ratio 1.03   
 RVIDd 5.69 cm Aortic Valve Systolic Mean Gradient 09.0 mmHg LV Mass .6 (A) 88 - 224 g LV Mass AL Index 111.8 49 - 115 g/m2 Left Atrium Major Axis 4.60 cm Triscuspid Valve Regurgitation Peak Gradient 23.6 mmHg  
 TR Max Velocity 242.64 cm/s  
EKG, 12 LEAD, INITIAL Collection Time: 02/22/19  6:21 PM  
Result Value Ref Range Ventricular Rate 60 BPM  
 Atrial Rate 60 BPM  
 P-R Interval 192 ms QRS Duration 104 ms Q-T Interval 430 ms QTC Calculation (Bezet) 430 ms Calculated P Axis -29 degrees Calculated R Axis 14 degrees Calculated T Axis 45 degrees Diagnosis ** Poor data quality, interpretation may be adversely affected Sinus rhythm with Possible premature atrial complexes with aberrant  
conduction When compared with ECG of 22-FEB-2019 14:41, 
premature ventricular complexes are no longer present 
aberrant conduction is now present Nonspecific T wave abnormality, improved in Inferior leads Nonspecific T wave abnormality, improved in Lateral leads PROTHROMBIN TIME + INR Collection Time: 02/23/19  2:28 AM  
Result Value Ref Range INR 1.1 0.9 - 1.1 Prothrombin time 10.9 9.0 - 11.1 sec METABOLIC PANEL, BASIC Collection Time: 02/23/19  2:28 AM  
Result Value Ref Range Sodium 138 136 - 145 mmol/L Potassium 4.2 3.5 - 5.1 mmol/L Chloride 110 (H) 97 - 108 mmol/L  
 CO2 20 (L) 21 - 32 mmol/L Anion gap 8 5 - 15 mmol/L Glucose 95 65 - 100 mg/dL BUN 18 6 - 20 MG/DL Creatinine 0.94 0.70 - 1.30 MG/DL  
 BUN/Creatinine ratio 19 12 - 20 GFR est AA >60 >60 ml/min/1.73m2 GFR est non-AA >60 >60 ml/min/1.73m2 Calcium 10.0 8.5 - 10.1 MG/DL  
GLUCOSE, POC Collection Time: 02/23/19  6:29 AM  
Result Value Ref Range Glucose (POC) 105 (H) 65 - 100 mg/dL [facility]: normal sinus rhythm PACs and PVCs Assessment:  
 
Principal Problem: 
  Fall (2/22/2019) Active Problems: 
  Back pain (2/22/2019) Atrial flutter ([address] (2/22/2019) Syncope (2/22/2019) Accelerated hypertension (2/22/2019) PACs PVCs Hard hearing Plan:  
 
Continue with current rate control medication. toprol Final report of Echo is pending. Prelim result is LVEF 45-50% with mild aortic sclerosis and AR, I have explained to him and daughter the echo Falls need work ups before NOAC can be used but also it was not clear he had atrial flutter or atrial tachycardia. If he goes home, follow up with Dr Delaney Degree is needed Argelia Bush M.D. [facility] Electrophysiology/Cardiology [facility] [address] [facility], [facility] [address] 
[phone]                                        [phone]

## 2019-02-23 NOTE — PROGRESS NOTES
Advance Care Planning Note Name: Raenette Boxer YOB: 1931 MRN: [id number] Admission Date: 2/22/2019  9:03 AM 
 
Date of discussion: 2/23/2019 Active Diagnoses: 
 
Hospital Problems  Date Reviewed: 4/21/2016 Codes Class Noted POA Back pain ICD-10-CM: M54.9 ICD-9-CM: 724.5  2/22/2019 Yes Atrial flutter ([address] ICD-10-CM: B22.88 
ICD-9-CM: 427.32  2/22/2019 Unknown Syncope ICD-10-CM: R55 
ICD-9-CM: 780.2  2/22/2019 Unknown Accelerated hypertension ICD-10-CM: I10 
ICD-9-CM: 401.0  2/22/2019 Unknown * (Principal) Fall ICD-10-CM: W19. Juan Diego Amina ICD-9-CM: E888.9  2/22/2019 Unknown These active diagnoses are of sufficient risk that focused discussion on advance care planning is indicated in order to allow the patient to thoughtfully consider personal goals of care, and if situations arise that prevent the ability to personally give input, to ensure appropriate representation of their personal desires for different levels and aggressiveness of care. Discussion:  
 
Persons present and participating in discussion: Roosevelt Negron MD, the daughter Discussion: Discussed the comorbidities as well as the events leading to the patient's admission. Discussed about the usual prognosis if somebody has to go through CPR/intubation. The daughter mentioned that \"he definitely did not want intubation. \" She and her sister will rethink about CPR and for now fine with Partial code. Time Spent:  
 
Total time spent face-to-face in education and discussion: 19 minutes.   
 
Clarita Soto MD 
2/23/2019 
10:05 AM

## 2019-02-23 NOTE — PROGRESS NOTES
14:45 TRANSFER - OUT REPORT: 
 
Verbal report given to ELOY Mills(name) on Daniel Watson  being transferred to [address] [place] [address](unit) for routine progression of care Report consisted of patients Situation, Background, Assessment and  
Recommendations(SBAR). Information from the following report(s) SBAR, Kardex, MAR, Recent Results and Cardiac Rhythm NSR was reviewed with the receiving nurse. Lines:  
Peripheral IV 02/22/19 Left Antecubital (Active) Site Assessment Clean, dry, & intact 2/23/2019 12:00 PM  
Phlebitis Assessment 0 2/23/2019 12:00 PM  
Infiltration Assessment 0 2/23/2019 12:00 PM  
Dressing Status Clean, dry, & intact 2/23/2019 12:00 PM  
Dressing Type Tape;Transparent 2/23/2019 12:00 PM  
Hub Color/Line Status Pink; Infusing 2/23/2019 12:00 PM  
Action Taken Open ports on tubing capped 2/23/2019 12:00 PM  
Alcohol Cap Used Yes 2/23/2019 12:00 PM  
  
 
Opportunity for questions and clarification was provided. Patient transported with: 
 [employer] Patient Vitals for the past 8 hrs: 
 Temp Pulse Resp BP SpO2  
02/23/19 1115 99.2 °F (37.3 °C) 70 23 142/78 97 % 02/23/19 0812  65  123/62  Problem: Falls - Risk of 
Goal: *Absence of Falls Document Savanna Robbins Fall Risk and appropriate interventions in the flowsheet. Outcome: Progressing Towards Goal 
Fall Risk Interventions: 
Mobility Interventions: Bed/chair exit alarm, Mechanical lift, OT consult for ADLs, Patient to call before getting OOB Mentation Interventions: Adequate sleep, hydration, pain control, Evaluate medications/consider consulting pharmacy, Eyeglasses and hearing aids, Familiar objects from home, Increase mobility, More frequent rounding, Reorient patient Elimination Interventions: Call light in reach, Patient to call for help with toileting needs, Toileting schedule/hourly rounds History of Falls Interventions: Consult care management for discharge planning, Evaluate medications/consider consulting pharmacy, Investigate reason for fall Problem: Pressure Injury - Risk of 
Goal: *Prevention of pressure injury Document Jayro Scale and appropriate interventions in the flowsheet. Outcome: Progressing Towards Goal 
Pressure Injury Interventions: 
Sensory Interventions: Assess changes in LOC, Check visual cues for pain, Discuss PT/OT consult with provider, Float heels, Maintain/enhance activity level, Monitor skin under medical devices, Minimize linen layers, Turn and reposition approx. every two hours (pillows and wedges if needed) Moisture Interventions: Apply protective barrier, creams and emollients, Check for incontinence Q2 hours and as needed, Maintain skin hydration (lotion/cream), Minimize layers, Moisture barrier, Limit adult briefs Activity Interventions: Assess need for specialty bed, Increase time out of bed, PT/OT evaluation Mobility Interventions: Assess need for specialty bed, PT/OT evaluation, Turn and reposition approx. every two hours(pillow and wedges), Float heels Nutrition Interventions: Document food/fluid/supplement intake, Discuss nutritional consult with provider Friction and Shear Interventions: Apply protective barrier, creams and emollients, Feet elevated on foot rest, HOB 30 degrees or less, Lift team/patient mobility team, Minimize layers

## 2019-02-24 NOTE — PROGRESS NOTES
Physical Therapy Note 2/24/19 Orders received, chart reviewed and patient evaluated by physical therapy. Recommend patient to discharge to Rehab pending progress with skilled acute care physical therapy. Recommend with nursing patient to complete as able in order to maintain strength, endurance and independence: OOB to chair 3x/day with Rw, gait belt, brace, and assist x1-2 pending vital stability. Thank you for your assistance. Full evaluation to follow.   
 
Lou Hyman, PT, DPT

## 2019-02-24 NOTE — PROGRESS NOTES
Hospitalist Progress Note Marjean Nissen, MD 
Answering service: [phone] from in house phone Cell: [phone] Date of Service:  2019 NAME:  Roderick Lyons :  3/12/1931 MRN:  [id number] Admission Summary: An 59-year-old gentleman with a significant history of hypertension, hyperlipidemia, hyperparathyroidism, prostate cancer, and pituitary gland disease, presented to the emergency room after a fall. He fell after a dizzy spell on a concrete floor on his back. He bled from his posterior scalp. He is not sure about loss of consciousness. Most of the history was from his wife and daughter. The patient is hard of hearing. In the ED, he was evaluated with a CAT scan of the head that was negative for an acute intracranial abnormality. He also underwent CAT scan of the cervical spine that showed no acute abnormality. Lumbar spine CT showed mildly displaced fracture of the anterior inferior corner of L1 with a thin fracture extending into the superior endplate and multilevel spondylosis. While he was admitted to the medical floor and while he was on a remote telemonitor, it was detected the patient developed atrial fibrillation. Heart rate was in 120s briefly, but was down to up to 60s. He did not require any rate control medicine. A stat EKG was done that did not capture atrial fibrillation or atrial flutter and his blood pressure was high and he was moved to Intermediate Care Unit. Due to new-onset atrial fibrillation/flutter without any clear precipitating factor, would like to rule out pulmonary embolism and an acute pulmonary disease. A CT angiogram of the chest was obtained, which was negative for blood clots. The patient was moved from the medical to [facility] bed, as he is anticipated to require recurrent blood pressure check.   For most of the day, the patient's blood pressure initially was 166/75, subsequently stayed in the 180s, came down to 168 and had a one time dose of clonidine 2 p.m. and his blood pressure has now come down briefly was 85/62 but correct one was 141/84, so the most recent blood pressure at this time of dictation is 140/84; since his heart rate is slowing down, the Lopressor that was ordered was discontinued. I have consulted Cardiology, who has already seen the patient. He had undergone echocardiogram, which is not reported yet. The patient reassessed on the unit. He has history of cervical spine surgery by Spine Surgery and lower back by Orthopedic Surgery. Interval history / Subjective:  
  F/u fall Just woke up, confused Back pain on movement Assessment & Plan:  
 
Fall, questionable loss of consciousness. 
-The patient falls frequently at home, reportedly likely sec to Orthostatic hypotension 
-CT head 2/22 unremarkable 
-Xray lumbar spine 2/22 Possible new compression deformities of the L1 and L2 vertebral bodies 
-CT lumbar spine 2/22 Mildly displaced fracture of the anterior inferior corner of L1. A thin fracture extension extends to the superior endplate as well 
-Echo EF 46-50% with no RWMA. Aortic valve sclerosis -PT/OT 
-Appreciate neurosurgery, recommended brace when out of bed. Outpatient follow up with Dr Gemma López Neurology Questionable new onset atrial flutter/fibrillation 
-?tachybrady syndrome 
-currently sinus  
-Appreciate Cardiology, may need pacemaker? tomorrow AMS 
-?acute hospital delirium 
-According to the daughter the patient does not have a diagnosis of Dementia. Does not forget except for occasional forgetfulness of day 
-CT head unremarkable 
-Appreciate Neurology, probably from concussion, no work up needed. Now probably close to baseline Uncontrolled hypertension -now improved 
-monitor Cholelithiasis 
-non tender 
-Outpatient follow up Scalp laceration is minimal 
-wound care. Hypothyroidism 
-on synthroid -TSH elevated, will increase the dose of synthroid History of prostate cancer Regular diet Code status: Partial CODE 
DVT prophylaxis: scd PTA: home Plan: Follow Cardiology Care Plan discussed with: Patient/Family Disposition: TBD Hospital Problems  Date Reviewed: 4/21/2016 Codes Class Noted POA Back pain ICD-10-CM: M54.9 ICD-9-CM: 724.5  2/22/2019 Yes Atrial flutter ([address] ICD-10-CM: P87.70 
ICD-9-CM: 427.32  2/22/2019 Unknown Syncope ICD-10-CM: R55 
ICD-9-CM: 780.2  2/22/2019 Unknown Accelerated hypertension ICD-10-CM: I10 
ICD-9-CM: 401.0  2/22/2019 Unknown * (Principal) Fall ICD-10-CM: W19. Carol Nelson ICD-9-CM: E888.9  2/22/2019 Unknown Review of Systems: A comprehensive review of systems was negative except for that written in the HPI. Vital Signs:  
 Last 24hrs VS reviewed since prior progress note. Most recent are: 
Visit Vitals /86 (BP 1 Location: Left arm, BP Patient Position: At rest) Pulse 70 Temp 97.7 °F (36.5 °C) Resp 15 Ht 6' 2\" (1.88 m) Wt 96.8 kg (213 lb 6.5 oz) SpO2 96% BMI 27.40 kg/m² Intake/Output Summary (Last 24 hours) at 2/24/2019 4452 Last data filed at 2/23/2019 1925 Gross per 24 hour Intake  Output 150 ml Net -150 ml Physical Examination:  
 
 
     
Constitutional:  No acute distress, cooperative, pleasant   
ENT:  Oral mucous moist, oropharynx benign. Neck supple, Resp:  CTA bilaterally. No wheezing/rhonchi/rales. No accessory muscle use CV:  Regular rhythm, normal rate, no murmurs, gallops, rubs GI:  Soft, non distended, non tender. normoactive bowel sounds, no hepatosplenomegaly Musculoskeletal:  No edema, warm, 2+ pulses throughout Neurologic:  Moves all extremities. AAOx0, CN II-XII reviewed Skin:  Good turgor, no rashes or ulcers Data Review: Review and/or order of clinical lab test 
 
 
Labs:  
 
Recent Labs  
  02/22/19 
9029 WBC 5.7 HGB 12.5 HCT 39.5  Recent Labs  
  02/23/19 
0228 02/22/19 
5599  137  
K 4.2 3.8 * 106 CO2 20* 27 BUN 18 23* CREA 0.94 1.20 GLU 95 101* CA 10.0 11.0* Recent Labs  
  02/22/19 
4889 SGOT 25 ALT 19  
* TBILI 1.3* TP 8.9* ALB 3.2*  
GLOB 5.7* Recent Labs  
  02/23/19 0228 INR 1.1 PTP 10.9 No results for input(s): FE, TIBC, PSAT, FERR in the last 72 hours. Lab Results Component Value Date/Time Folate 18.6 02/22/2019 09:18 AM  
  
No results for input(s): PH, PCO2, PO2 in the last 72 hours. Recent Labs  
  02/22/19 
2353 TROIQ <0.05 Lab Results Component Value Date/Time Cholesterol, total 143 03/22/2016 10:21 AM  
 HDL Cholesterol 53 03/22/2016 10:21 AM  
 LDL, calculated 78 03/22/2016 10:21 AM  
 Triglyceride 60 03/22/2016 10:21 AM  
 CHOL/HDL Ratio 2.7 06/23/2010 09:08 AM  
 
Lab Results Component Value Date/Time Glucose (POC) 105 (H) 02/23/2019 06:29 AM  
 Glucose (POC) 97 12/12/2012 10:06 AM  
 
Lab Results Component Value Date/Time Color DARK YELLOW 12/13/2018 01:18 PM  
 Appearance CLEAR 12/13/2018 01:18 PM  
 Specific gravity 1.021 12/13/2018 01:18 PM  
 pH (UA) 6.0 12/13/2018 01:18 PM  
 Protein 30 (A) 12/13/2018 01:18 PM  
 Glucose NEGATIVE  12/13/2018 01:18 PM  
 Ketone TRACE (A) 12/13/2018 01:18 PM  
 Bilirubin NEGATIVE  10/10/2018 09:54 AM  
 Urobilinogen >8.0 (H) 12/13/2018 01:18 PM  
 Nitrites NEGATIVE  12/13/2018 01:18 PM  
 Leukocyte Esterase NEGATIVE  12/13/2018 01:18 PM  
 Epithelial cells FEW 12/13/2018 01:18 PM  
 Bacteria NEGATIVE  12/13/2018 01:18 PM  
 WBC 0-4 12/13/2018 01:18 PM  
 RBC 0-5 12/13/2018 01:18 PM  
 
 
 
Medications Reviewed:  
 
Current Facility-Administered Medications Medication Dose Route Frequency  metoprolol succinate (TOPROL-XL) XL tablet 25 mg  25 mg Oral DAILY  sodium chloride (NS) flush 5-40 mL  5-40 mL IntraVENous Q8H  
 sodium chloride (NS) flush 5-40 mL  5-40 mL IntraVENous PRN  
 sodium chloride (NS) flush 5-40 mL  5-40 mL IntraVENous Q8H  
 sodium chloride (NS) flush 5-40 mL  5-40 mL IntraVENous PRN  
 acetaminophen (TYLENOL) tablet 650 mg  650 mg Oral Q4H PRN  
 HYDROcodone-acetaminophen (NORCO) 5-325 mg per tablet 1 Tab  1 Tab Oral Q4H PRN  
 morphine injection 1 mg  1 mg IntraVENous Q4H PRN  
 naloxone (NARCAN) injection 0.4 mg  0.4 mg IntraVENous PRN  
 ondansetron (ZOFRAN ODT) tablet 4 mg  4 mg Oral Q4H PRN  
 bisacodyl (DULCOLAX) tablet 5 mg  5 mg Oral DAILY PRN  pravastatin (PRAVACHOL) tablet 40 mg  40 mg Oral DAILY  tamsulosin (FLOMAX) capsule 0.4 mg  0.4 mg Oral DAILY  levothyroxine (SYNTHROID) tablet 150 mcg  150 mcg Oral ACB  hydrALAZINE (APRESOLINE) 20 mg/mL injection 10 mg  10 mg IntraVENous Q6H PRN  
 bacitracin 500 unit/gram ointment   Topical TID  lisinopril (PRINIVIL, ZESTRIL) tablet 20 mg  20 mg Oral DAILY  
 
______________________________________________________________________ EXPECTED LENGTH OF STAY: 2d 4h 
ACTUAL LENGTH OF STAY:          2 Temitope Anderson MD

## 2019-02-24 NOTE — PROGRESS NOTES
Problem: Falls - Risk of 
Goal: *Absence of Falls Document Valeria Sandoval Fall Risk and appropriate interventions in the flowsheet. Outcome: Progressing Towards Goal 
Fall Risk Interventions: 
Mobility Interventions: PT Consult for mobility concerns Mentation Interventions: Family/sitter at bedside Medication Interventions: Patient to call before getting OOB, Bed/chair exit alarm Elimination Interventions: Bed/chair exit alarm, Call light in reach History of Falls Interventions: Bed/chair exit alarm, Room close to nurse's station

## 2019-02-24 NOTE — PROGRESS NOTES
Problem: Mobility Impaired (Adult and Pediatric) Goal: *Acute Goals and Plan of Care (Insert Text) Physical Therapy Goals Initiated 2/24/2019 1. Patient will move from supine to sit and sit to supine  and scoot up and down in bed with supervision/set-up within 7 days. 2. Patient will perform sit to stand with supervision/set-up within 7 days. 3. Patient will ambulate with supervision/set-up for 100 feet with the least restrictive device within 7 days. 4. Patient will verbalize and demonstrate understanding of spinal precautions (No bending, lifting greater than 5 lbs, or twisting; log-roll technique; frequent repositioning as instructed) within 7 days. physical Therapy EVALUATION Patient: Katlyn Garcia (57 y.o. male) Date: 2/24/2019 Primary Diagnosis: Back pain [M54.9] Fall [W19. Victory Vivien Syncope [R55] Atrial flutter ([address] [I48.92] Accelerated hypertension [I10] Precautions:   Bed Alarm quick draw lumbar brace when OOB ASSESSMENT : 
Based on the objective data described below, the patient presents with impaired functional mobility as compared to baseline level 2* c/o pain, confusion (A&O x2), decreased functional strength, decreased tolerance to activity, decreased attention to task, c/o dizziness with mobility, impaired balance, and impaired gait following admission for GLF resulting in L1 compression fx. No surgical intervention planned by neuro - recommend quick draw brace. Prior to this admission, pt/family report that he lived in a basement apartment in his daughters home with family checking in/providing assist as needed, however he was mod I with BADLs and ambulation using RW. Provided education on spine precautions for protection, fit for abd obtained correct brace, and provided education on donning/doffing and use. He required up to mod A for supine<>sit via log roll sequencing and increased time.  Once sitting, pt c/o dizziness (SBP only dropped from 101 to 94) that minimally improved with duration and B LE AROM. Progressed to completing sit<>stands x2 reps from elevated bed surface - min A for lift of, stability through transition, and to obtain upright posture. Able to take several small side steps towards [facility] with assist for stability and RW management - definite falls risk at this time. Pt with continued c/o dizziness and note HR jump from 60s/70s at rest to 140s. Assisted back to bed and BP reassessed - SBP 150s. RN notified. At this time, he is below his baseline and a falls risk - do not feel that he would be safe to be alone from cog or physical standpoint. may benefit from short stay rehab pending progress. Will follow. Patient will benefit from skilled intervention to address the above impairments. Patients rehabilitation potential is considered to be Good Factors which may influence rehabilitation potential include:  
[]         None noted 
[]         Mental ability/status []         Medical condition 
[x]         Home/family situation and support systems 
[x]         Safety awareness [x]         Pain tolerance/management 
[]         Other: PLAN : 
Recommendations and Planned Interventions: 
[x]           Bed Mobility Training             [x]    Neuromuscular Re-Education 
[x]           Transfer Training                   []    Orthotic/Prosthetic Training 
[x]           Gait Training                         []    Modalities [x]           Therapeutic Exercises           []    Edema Management/Control 
[x]           Therapeutic Activities            [x]    Patient and Family Training/Education 
[]           Other (comment): Frequency/Duration: Patient will be followed by physical therapy  5 times a week to address goals. Discharge Recommendations: Rehab Further Equipment Recommendations for Discharge: TBD - SUBJECTIVE:  
Patient stated I'm dizzy, I just dont feel right.  OBJECTIVE DATA SUMMARY:  
 HISTORY:   
Past Medical History:  
Diagnosis Date  Cancer [facility])   
 prostate  DJD (degenerative joint disease)  Elevated cholesterol  GERD (gastroesophageal reflux disease)  Hyperparathyroidism, primary ([address]  Hypertension  Obstructive sleep apnea   
 uses cpap  Thyroid disease HX of Graves Disease Past Surgical History:  
Procedure Laterality Date  HX HEENT    
 thyroidectomy  HX HEENT    
 tonsilectomy [facility] [address]  HX ORTHOPAEDIC    
 right femoral neck fracture  HX ORTHOPAEDIC    
 bilateral ankle surgery  HX ORTHOPAEDIC  12/12/12 LEFT ANKLE REFUSION AND REMOVAL POSTERIOR SCREW  
 [place] COLONOSCOPY FLX DX W/COLLJ SPEC WHEN PFRMD  12/11/2006 Dr Darius Cervantes  repeat 12/2011  [place] PROSTATE BIOPSY, NEEDLE, SATURATION SAMPLING Prior Level of Function/Home Situation: lives in a basement apartment at his daughters house however is alone for periods during the day and was mod I with BADLs and ambulating using a RW Personal factors and/or comorbidities impacting plan of care: PMHx Home Situation Home Environment: Private residence # Steps to Enter: 0 One/Two Story Residence: Two story, live on 1st floor Living Alone: No 
Support Systems: Family member(s) Patient Expects to be Discharged to[de-identified] Private residence Current DME Used/Available at Home: Walker, rolling EXAMINATION/PRESENTATION/DECISION MAKING: Critical Behavior: 
Neurologic State: Alert Orientation Level: Oriented to place, Oriented to person, Disoriented to time Cognition: Follows commands Safety/Judgement: Awareness of environment Hearing: Auditory Auditory Impairment: Hard of hearing, left side, Hearing aid(s) Hearing Aids/Status: LeftSkin:  Intact where exposed Edema:  
Range Of Motion: 
AROM: Generally decreased, functional 
 PROM: Generally decreased, functional 
   
Strength:   
Strength: Generally decreased, functional 
  
  
  
  
Tone & Sensation:  
Tone: Normal 
 Sensation: Intact Coordination: 
Coordination: Generally decreased, functional 
Vision:  
Diplopia: No 
Functional Mobility: 
Bed Mobility: 
Supine to Sit: Moderate assistance Sit to Supine: Moderate assistance Transfers: 
Sit to Stand: Minimum assistance Stand to Sit: Minimum assistance Balance:  
Sitting: Impaired Sitting - Static: Good (unsupported) Sitting - Dynamic: Fair (occasional) Standing: Impaired; With support Standing - Static: Fair;Constant support Standing - Dynamic : FairAmbulation/Gait Training:Distance (ft): 2 Feet (ft) Assistive Device: Gait belt;Walker, rolling Ambulation - Level of Assistance: Minimal assistance Gait Description (WDL): Exceptions to [facility] Gait Abnormalities: Decreased step clearance; Antalgic Base of Support: Widened Speed/Allison: Shuffled; Slow Step Length: Left shortened;Right shortened Functional Measure: 
Tinetti test: 
 
Sitting Balance: 1 Arises: 0 Attempts to Rise: 0 Immediate Standing Balance: 0 Standing Balance: 0 Nudged: 0 Eyes Closed: 0 Turn 360 Degrees - Continuous/Discontinuous: 0 Turn 360 Degrees - Steady/Unsteady: 0 Sitting Down: 0 Balance Score: 1 Indication of Gait: 0 
R Step Length/Height: 0 
L Step Length/Height: 0 
R Foot Clearance: 0 
L Foot Clearance: 0 Step Symmetry: 0 Step Continuity: 0 Path: 0 Trunk: 0 Walking Time: 0 Gait Score: 0 Total Score: 1 Tinetti Tool Score Risk of Falls 
<19 = High Fall Risk 19-24 = Moderate Fall Risk 25-28 = Low Fall Risk Tinetti ME. Performance-Oriented Assessment of Mobility Problems in Elderly Patients. Garcia 66; [id number]. (Scoring Description: PT Bulletin Feb. 10, 1993) Older adults: Robyn Chau et al, 2009; n = [address] elderly evaluated with ABC, LIONEL, ADL, and IADL) · Mean LIONEL score for males aged 69-68 years = 26.21(3.40) · Mean LIONEL score for females age 69-68 years = 25.16(4.30) · Mean LIONEL score for males over 80 years = 23.29(6.02) · Mean LIONEL score for females over 80 years = 17.20(8.32) Physical Therapy Evaluation Charge Determination History Examination Presentation Decision-Making HIGH Complexity :3+ comorbidities / personal factors will impact the outcome/ POC  MEDIUM Complexity : 3 Standardized tests and measures addressing body structure, function, activity limitation and / or participation in recreation  MEDIUM Complexity : Evolving with changing characteristics  HIGH Complexity : FOTO score of 1- 25 Based on the above components, the patient evaluation is determined to be of the following complexity level: MEDIUM Pain: 
Pain Scale 1: Numeric (0 - 10) Pain Intensity 1: 0 Activity Tolerance:  
Dizziness with activity - SBP lows but not technically orthostatic (100s to 90s), HR up to 140s Please refer to the flowsheet for vital signs taken during this treatment. After treatment:  
[]         Patient left in no apparent distress sitting up in chair 
[x]         Patient left in no apparent distress in bed 
[x]         Call bell left within reach [x]         Nursing notified 
[x]         Caregiver present [x]         Bed alarm activated COMMUNICATION/EDUCATION:  
The patients plan of care was discussed with: Registered Nurse and Physician. [x]         Fall prevention education was provided and the patient/caregiver indicated understanding. [x]         Patient/family have participated as able in goal setting and plan of care. [x]         Patient/family agree to work toward stated goals and plan of care. []         Patient understands intent and goals of therapy, but is neutral about his/her participation. []         Patient is unable to participate in goal setting and plan of care. Thank you for this referral. 
Mary Ureña, PT, DPT Time Calculation: 54 mins

## 2019-02-24 NOTE — PROGRESS NOTES
Cardiology Progress Note 2/24/2019 12:02 PM 
 
Admit Date: 2/22/2019 Admit Diagnosis: Back pain [M54.9]; Fall [W19. Victory Vivien; Syncope [R55]; Atrial flutter ([facility] [address] [I48.92]; Accelerated hypertension [I10] Subjective:  
 
Katlyn Garcia has hard hearing but he has hearing aid available with him today His second daughter helps with questions He lives with her sister whom I met yesterday She said he has had falls before this admission and this time he suffered L1 fracture for which neurosurgeon recommended brace and conservative treatment AFIB flutter is new to her and him (AFL Fib with RVR 2/22/2019) He got up with physical therapist before I came in and she told me that he was dizzy and orthostatic hypotension was noted This resolved when he got back in bed Estela Ojeda, physical therapist documented and discussed with me and his nurse, his daughter: \"Once sitting, pt c/o dizziness (SBP only dropped from 101 to 94) that minimally improved with duration and B LE AROM. Progressed to completing sit<>stands x2 reps from elevated bed surface - min A for lift of, stability through transition, and to obtain upright posture. Able to take several small side steps towards [address] with assist for stability and RW management - definite falls risk at this time. Pt with continued c/o dizziness and note HR jump from 60s/70s at rest to 140s. Assisted back to bed and BP reassessed - SBP 150s\" Echo with LVEF 45-50% mild AR Past Medical History:  
Diagnosis Date  Cancer [facility])   
 prostate  DJD (degenerative joint disease)  Elevated cholesterol  GERD (gastroesophageal reflux disease)  Hyperparathyroidism, primary ([facility] [address]  Hypertension  Obstructive sleep apnea   
 uses cpap  Thyroid disease HX of Graves Disease Past Surgical History:  
Procedure Laterality Date  HX HEENT    
 thyroidectomy  HX HEENT    
 tonsilectomy [facility] [address]  HX ORTHOPAEDIC    
 right femoral neck fracture  HX ORTHOPAEDIC    
 bilateral ankle surgery  HX ORTHOPAEDIC  12/12/12 LEFT ANKLE REFUSION AND REMOVAL POSTERIOR SCREW  
 [place] COLONOSCOPY FLX DX W/COLLJ SPEC WHEN PFRMD  12/11/2006 Dr Roosevelt Thomas  repeat 12/2011  [place] PROSTATE BIOPSY, NEEDLE, SATURATION SAMPLING Social History Socioeconomic History  Marital status:  Spouse name: Not on file  Number of children: Not on file  Years of education: Not on file  Highest education level: Not on file Social Needs  Financial resource strain: Not on file  Food insecurity - worry: Not on file  Food insecurity - inability: Not on file  Transportation needs - medical: Not on file  Transportation needs - non-medical: Not on file Occupational History  Not on file Tobacco Use  Smoking status: Never Smoker  Smokeless tobacco: Never Used Substance and Sexual Activity  Alcohol use: Yes Comment: rare  Drug use: No  
 Sexual activity: Not on file Other Topics Concern  Not on file Social History Narrative  Not on file Visit Vitals /86 (BP 1 Location: Left arm, BP Patient Position: At rest) Pulse 70 Temp 97.7 °F (36.5 °C) Resp 15 Ht 6' 2\" (1.88 m) Wt 213 lb 6.5 oz (96.8 kg) SpO2 96% BMI 27.40 kg/m² Current Facility-Administered Medications Medication Dose Route Frequency  sodium chloride (NS) flush 5-40 mL  5-40 mL IntraVENous Q8H  
 sodium chloride (NS) flush 5-40 mL  5-40 mL IntraVENous PRN  
 sodium chloride (NS) flush 5-40 mL  5-40 mL IntraVENous Q8H  
 sodium chloride (NS) flush 5-40 mL  5-40 mL IntraVENous PRN  
 acetaminophen (TYLENOL) tablet 650 mg  650 mg Oral Q4H PRN  
 HYDROcodone-acetaminophen (NORCO) 5-325 mg per tablet 1 Tab  1 Tab Oral Q4H PRN  
 morphine injection 1 mg  1 mg IntraVENous Q4H PRN  
 naloxone (NARCAN) injection 0.4 mg  0.4 mg IntraVENous PRN  
  ondansetron (ZOFRAN ODT) tablet 4 mg  4 mg Oral Q4H PRN  
 bisacodyl (DULCOLAX) tablet 5 mg  5 mg Oral DAILY PRN  pravastatin (PRAVACHOL) tablet 40 mg  40 mg Oral DAILY  tamsulosin (FLOMAX) capsule 0.4 mg  0.4 mg Oral DAILY  levothyroxine (SYNTHROID) tablet 150 mcg  150 mcg Oral ACB  hydrALAZINE (APRESOLINE) 20 mg/mL injection 10 mg  10 mg IntraVENous Q6H PRN  
 bacitracin 500 unit/gram ointment   Topical TID Objective:  
  
Physical Exam: 
Visit Vitals /86 (BP 1 Location: Left arm, BP Patient Position: At rest) Pulse 70 Temp 97.7 °F (36.5 °C) Resp 15 Ht 6' 2\" (1.88 m) Wt 213 lb 6.5 oz (96.8 kg) SpO2 96% BMI 27.40 kg/m² General Appearance:  Well developed, well nourished,alert and individual in no acute distress. Ears/Nose/Mouth/Throat:   Hearing (difficult) Neck: Supple. Chest:   Lungs clear to auscultation bilaterally. Cardiovascular:  irregular rhythm, 2/6 LSB murmur. Abdomen:   Soft, non-tender Extremities: No edema bilaterally. Skin: Warm and dry. Data Review:  
Labs:   
Recent Results (from the past 24 hour(s)) TSH 3RD GENERATION Collection Time: 02/24/19  6:39 AM  
Result Value Ref Range TSH 6.67 (H) 0.36 - 3.74 uIU/mL Telemetry: normal sinus rhythm PACs and PVCs Assessment:  
 
Principal Problem: 
  Fall (2/22/2019) Active Problems: 
  Back pain (2/22/2019) Atrial flutter ([address] (2/22/2019) Syncope (2/22/2019) Accelerated hypertension (2/22/2019) PACs PVCs Hard hearing Plan:  
Orthostatic hypotension with low dose lisinopril and toprol and supine hypertension LVEF 45-50% with mild aortic sclerosis and AR, I have explained to him and daughter the echo result Falls so no NOAC can be used His atrial flutter or atrial tachycardia will likely recur and cannot use rate control medication due to orthostatic hypotension.  This may be the reason he has been falling. He will not be able to tolerate medications I discussed with his daughter and she wants quality of life and is concerned about skin infection with transvenous pacemaker and av node ablation but comfortable with leadless pacemaker and av node ablation. She will discuss with her other sister but she wants me to go ahead and schedule for tomorrow. His mildly low LVEF could be due to unknown tachycardia and because of his dementia and fall risk, hygiene care, she and I agree that biventricular pacer is not an option. Swetha Vega M.D. [facility] Electrophysiology/Cardiology [address] [place] [address] and Vascular [facility], [address] [place] [address] 
[phone]                                        [phone]

## 2019-02-24 NOTE — CONSULTS
Yfn Garcia Name:  Hero Winter 
MR#:  [id number] :  [id number] ACCOUNT #:  [de-identified] DATE OF SERVICE:  2019 REASON FOR CONSULTATION:  Lumbar fracture. HISTORY OF PRESENT ILLNESS:  This is an 71-year-old gentleman who lives with his daughter. He stays in her basement. She says that she has cameras there when she is not at home to keep an eye on him. Yesterday, he had a fall. He fell backwards and struck his head. She is not sure if there was any loss of consciousness. He did not have a scalp laceration. He was brought to the emergency room for further evaluation. Head CT was unremarkable. He also had a lumbar spine CT because he was complaining of some back pain. This showed a small anterior inferior L1 fracture without any significant malalignment. There also were multiple levels of spondylosis. He was noted to have some cardiac arrhythmias in the emergency room and was sent to a telemetry unit for further evaluation. He also had a CT angiogram to rule out pulmonary embolism. He was noted to have hypertension and this was treated as well. Cardiology was consulted. I was consulted for his lumbar fracture. Since his admission, his daughters have noticed that he has been very confused at times and state this is unlike him. PAST MEDICAL HISTORY: 
1. Hypertension. 2.  Hyperlipidemia. 3.  Hyperparathyroidism. 4.  Prostate cancer. 5.  Pituitary gland disease. PAST SURGICAL HISTORY: 
1. Thyroidectomy. 2.  Tonsillectomy. 3.  Right femur neck fracture. 4.  Bilateral ankle surgery. MEDICATIONS PRIOR TO ADMISSION: 
1. Aspirin 81 mg q. day. 2.  Vitamin D3 1000 units q. day. 3.  Synthroid 125 mcg q. day. 4.  Prinivil 20 mg q. day. 5.  Potassium chloride 20 mEq q. day. 6.  Pravachol 40 mg q. day. 7.  Flomax 0.4 mg q. day. ALLERGIES:  NO KNOWN DRUG ALLERGIES. SOCIAL HISTORY:  He lives with his family. He uses a walker. No history of tobacco or alcohol use. FAMILY HISTORY:  Positive for hypertension, coronary artery disease. REVIEW OF SYSTEMS:  Unobtainable from the patient. He is not a reliable historian at this time. He is confused. He thinks he is at his granddaughter's home and keeps trying to get out of bed during our examination. PHYSICAL EXAMINATION: 
GENERAL:  This is a well-developed, well-nourished gentleman. He is very lethargic, but does open his eyes and will answer questions, although he is not participating actively in the conversation. He is able to tell me his name. He does not know where he is. He does not know the date. He is having some difficulty recognizing his family members. He has a conjugate gaze, symmetric face, moving all extremities symmetrically. Currently not complaining of any back pain. VITAL SIGNS:  Temperature 98.3, blood pressure 104/67, pulse 54. IMAGING STUDIES:  CT scan of the lumbar spine that shows an L1 fracture, minimally displaced in the anterior portion of the vertebral body, does not extend through the posterior cortex. There is no evidence of posterior structure displacement, malalignment, or fracture. This is an isolated anterior column injury. No evidence of neuro compromise. ASSESSMENT AND PLAN:  This is an 80-year-old gentleman who has a fracture on the anterior inferior portion of L1 without any significant malalignment. I think this will heal with a brace. I do not recommend any further imaging unless he complains of increased pain. He can follow up with me in four weeks as an outpatient for standing x-rays to rule out any collapse or displacement. Regarding his confusion, I discussed with his daughters he may have a post-concussive syndrome. If this persists he may benefit from post-conscussive treatment (rehab) as an outpatient. Thank you for this consultation. MD TG Elise/[email or web address]/ 
D:  02/23/2019 18:59 T:  02/24/2019 0:29 
JOB #:  [id number]

## 2019-02-24 NOTE — PROGRESS NOTES
Problem: Pressure Injury - Risk of 
Goal: *Prevention of pressure injury Document Jayro Scale and appropriate interventions in the flowsheet. Outcome: Progressing Towards Goal 
Pressure Injury Interventions: 
Sensory Interventions: Assess changes in LOC Moisture Interventions: Absorbent underpads Activity Interventions: PT/OT evaluation Mobility Interventions: Pressure redistribution bed/mattress (bed type) Nutrition Interventions: Offer support with meals,snacks and hydration Friction and Shear Interventions: Lift sheet, Minimize layers

## 2019-02-24 NOTE — CONSULTS
NEUROLOGY 
2/24/2019 Consulted by: Leidy Major MD   
 
 
Patient ID: Daniel Watson [id number] 
61 y.o. 
3/12/1931 Chief Complaint Patient presents with  Fall  Dizziness HPI 
26-year-old gentleman with hypertension hyperlipidemia who is here because he fell February 22. He was dizzy and fell backwards hitting his head. No loss of consciousness. Head CT with chronic changes. Neurology consulted for concern of mental status changes while being in the hospital.  His daughter is at the bedside. No history of dementia. She tells me today he is back to his baseline. He denies headache. He does have an L1 fracture being watched. No pain unless he standing. Review of Systems Constitutional: Positive for malaise/fatigue. Eyes: Negative for double vision. Musculoskeletal: Positive for back pain. Neurological: Positive for dizziness. Negative for headaches. Psychiatric/Behavioral:  
     Confusion All other systems reviewed and are negative. Past Medical History:  
Diagnosis Date  Cancer [facility])   
 prostate  DJD (degenerative joint disease)  Elevated cholesterol  GERD (gastroesophageal reflux disease)  Hyperparathyroidism, primary ([facility] [address]  Hypertension  Obstructive sleep apnea   
 uses cpap  Thyroid disease HX of Graves Disease Family History Problem Relation Age of Onset  Hypertension Mother  Heart Disease Father CAD  Heart Disease Brother Social History Socioeconomic History  Marital status:  Spouse name: Not on file  Number of children: Not on file  Years of education: Not on file  Highest education level: Not on file Social Needs  Financial resource strain: Not on file  Food insecurity - worry: Not on file  Food insecurity - inability: Not on file  Transportation needs - medical: Not on file  Transportation needs - non-medical: Not on file Occupational History  Not on file Tobacco Use  Smoking status: Never Smoker  Smokeless tobacco: Never Used Substance and Sexual Activity  Alcohol use: Yes Comment: rare  Drug use: No  
 Sexual activity: Not on file Other Topics Concern  Not on file Social History Narrative  Not on file Current Facility-Administered Medications Medication Dose Route Frequency  sodium chloride (NS) flush 5-40 mL  5-40 mL IntraVENous Q8H  
 sodium chloride (NS) flush 5-40 mL  5-40 mL IntraVENous PRN  
 [START ON 2/25/2019] ceFAZolin (ANCEF) 2 g/20 mL in sterile water IV syringe  2 g IntraVENous ONCE  
 sodium chloride (NS) flush 5-40 mL  5-40 mL IntraVENous Q8H  
 sodium chloride (NS) flush 5-40 mL  5-40 mL IntraVENous PRN  
 sodium chloride (NS) flush 5-40 mL  5-40 mL IntraVENous Q8H  
 sodium chloride (NS) flush 5-40 mL  5-40 mL IntraVENous PRN  
 acetaminophen (TYLENOL) tablet 650 mg  650 mg Oral Q4H PRN  
 HYDROcodone-acetaminophen (NORCO) 5-325 mg per tablet 1 Tab  1 Tab Oral Q4H PRN  
 morphine injection 1 mg  1 mg IntraVENous Q4H PRN  
 naloxone (NARCAN) injection 0.4 mg  0.4 mg IntraVENous PRN  
 ondansetron (ZOFRAN ODT) tablet 4 mg  4 mg Oral Q4H PRN  
 bisacodyl (DULCOLAX) tablet 5 mg  5 mg Oral DAILY PRN  pravastatin (PRAVACHOL) tablet 40 mg  40 mg Oral DAILY  tamsulosin (FLOMAX) capsule 0.4 mg  0.4 mg Oral DAILY  levothyroxine (SYNTHROID) tablet 150 mcg  150 mcg Oral ACB  hydrALAZINE (APRESOLINE) 20 mg/mL injection 10 mg  10 mg IntraVENous Q6H PRN  
 bacitracin 500 unit/gram ointment   Topical TID No Known Allergies Visit Vitals /67 (BP 1 Location: Left arm, BP Patient Position: At rest) Pulse 61 Temp 98.2 °F (36.8 °C) Resp 16 Ht 6' 2\" (1.88 m) Wt 96.8 kg (213 lb 6.5 oz) SpO2 95% BMI 27.40 kg/m² Physical Exam  
Constitutional: He appears well-developed and well-nourished. Cardiovascular: Normal rate.   
Pulmonary/Chest: Effort normal.  
 Skin: Skin is warm and dry. Vitals reviewed. Neurologic Exam  
 
Mental Status Elderly gentleman supine. Awake and alert and appropriate. He follows commands. Pupils are equal, EOMI Face is symmetric tongue is midline Very poor dentition Speech is clear but slow Upper extremities 5/5 Lower extremities not tested secondary to L1 fracture Sensation grossly intact throughout Finger to nose symmetric slow Gait deferred due to condition Lab Results Component Value Date/Time WBC 5.7 02/22/2019 09:18 AM  
 HGB 12.5 02/22/2019 09:18 AM  
 Hemoglobin (POC) 11.6 (L) 12/12/2012 10:06 AM  
 HCT 39.5 02/22/2019 09:18 AM  
 Hematocrit (POC) 34 (L) 12/12/2012 10:06 AM  
 PLATELET 262 85/88/6999 09:18 AM  
 .4 (H) 02/22/2019 09:18 AM  
 
Lab Results Component Value Date/Time Hemoglobin A1c 6.0 (H) 08/14/2009 04:35 AM  
 Hemoglobin A1c 6.1 (H) 08/13/2009 05:15 AM  
 Glucose 95 02/23/2019 02:28 AM  
 Glucose (POC) 105 (H) 02/23/2019 06:29 AM  
 LDL, calculated 78 03/22/2016 10:21 AM  
 Creatinine (POC) 0.9 12/12/2012 10:06 AM  
 Creatinine 0.94 02/23/2019 02:28 AM  
  
Lab Results Component Value Date/Time Cholesterol, total 143 03/22/2016 10:21 AM  
 HDL Cholesterol 53 03/22/2016 10:21 AM  
 LDL, calculated 78 03/22/2016 10:21 AM  
 Triglyceride 60 03/22/2016 10:21 AM  
 CHOL/HDL Ratio 2.7 06/23/2010 09:08 AM  
 
Lab Results Component Value Date/Time ALT (SGPT) 19 02/22/2019 09:18 AM  
 AST (SGOT) 25 02/22/2019 09:18 AM  
 Alk. phosphatase 137 (H) 02/22/2019 09:18 AM  
 Bilirubin, direct 0.2 08/13/2009 05:15 AM  
 Bilirubin, total 1.3 (H) 02/22/2019 09:18 AM  
 Albumin 3.2 (L) 02/22/2019 09:18 AM  
 Protein, total 8.9 (H) 02/22/2019 09:18 AM  
 INR 1.1 02/23/2019 02:28 AM  
 Prothrombin time 10.9 02/23/2019 02:28 AM  
 PLATELET 066 67/84/8349 09:18 AM  
  
 
CT Results (maximum last 3): Results from [facility]  EDDA Encounter encounter on 02/22/19 CTA CHEST W OR W WO CONT Narrative History: Atrial fibrillation. CTA of the chest was performed. 80 mL Isovue-370 were injected and scanning was 
performed during the arterial phase of contrast administration. Post processing 
was performed. 3D reconstructions were performed. CT dose reduction was 
achieved through use of a standardized protocol tailored for this examination 
and automatic exposure control for dose modulation. There are no intraluminal filling defects to suggest pulmonary embolism. The 
heart, pericardium, and great vessels appear unremarkable. The chest wall and 
axilla appear unremarkable. The lungs are clear. The main pulmonary artery is 
enlarged and measures 3.1 cm. There are calculi within the gallbladder. Impression IMPRESSION:  
 
1. No evidence for pulmonary embolism. 2. Cholelithiasis. CT SPINE LUMB WO CONT Narrative EXAM:  CT LUMBAR SPINE WITHOUT  CONTRAST INDICATION: fall, back pain. COMPARISON: Radiographs 2/20/2018. CONTRAST:  None. TECHNIQUE: Multislice helical CT of the lumbar spine was performed without 
intravenous contrast administration. Coronal and sagittal reconstructions were 
generated. CT dose reduction was achieved through use of a standardized 
protocol tailored for this examination and automatic exposure control for dose 
modulation. FINDINGS: 
 
The bones are osteopenic. There is mild levoconvex scoliosis centered at L2. There is a fracture of the anterior inferior corner of the L1 vertebral body. There is slight downward displacement of the anterior inferior corner by 4 mm. There is a fracture extension that extends upwards to the L1 superior endplate. This does not extend into the posterior vertebral body or posterior elements. There is no significant loss of height. There is no retropulsion of bony 
fragments. Vertebral body heights are maintained. No additional fracture. No 
subluxation. Lower thoracic spine: The spinal canal and neural foramina are widely patent. L1-L2: Mild disc space narrowing. No significant spinal stenosis or neural 
foraminal narrowing. L2-L3: Partial fusion across the endplates which is likely degenerative. Mild 
broad-based disc osteophyte complex causing mild spinal stenosis. There is no 
left neural foraminal narrowing. There is mild right neural foraminal narrowing. L3-L4: Mild disc space narrowing. Mild broad-based disc bulge with thickening of 
ligamentum flavum causing moderate spinal stenosis. There is moderate right and 
mild left neural foraminal narrowing. [place] L4-L5: Vacuum phenomenon. Mild broad-based disc bulge and thickening of 
ligamentum flavum causing moderate spinal stenosis. There is moderate bilateral 
neural foraminal narrowing. L5-S1: Mild disc space narrowing. Mild broad-based disc bulge without 
significant spinal stenosis. There is severe left and mild right neural 
foraminal narrowing. [place] Impression IMPRESSION: 
1. Mildly displaced fracture of the anterior inferior corner of L1. A thin 
fracture extension extends to the superior endplate as well. 2. Multilevel spondylosis MRI Results (maximum last 3): No results found for this or any previous visit. VAS/US/Carotid Doppler Results (maximum last 3): No results found for this or any previous visit. PET Results (maximum last 3): No results found for this or any previous visit. Assessment and Plan 70-year-old gentleman who had a fall possibly with alteration of consciousness. No history of dementia per family. Suspect that he might of had some mental status changes due to being concussed with some delirium superimposed from being in the hospital.  Family says he is pretty much back to his baseline. No headache. I would continue to watch.   He might have more episodes of confusion the longer he stays in the hospital.  Be careful with excessive sedating pain control. No further workup from neurology. Call if needed. I  spoke with his daughter. 2 [facility],  
NEUROLOGIST Diplomate ABPN 
2/24/2019

## 2019-02-25 PROBLEM — Z95.0 PACEMAKER: Status: ACTIVE | Noted: 2019-01-01

## 2019-02-25 PROBLEM — I97.190 COMPLETE AV BLOCK DUE TO AV NODAL ABLATION (HCC): Status: ACTIVE | Noted: 2019-01-01

## 2019-02-25 PROBLEM — I44.2 COMPLETE AV BLOCK DUE TO AV NODAL ABLATION (HCC): Status: ACTIVE | Noted: 2019-01-01

## 2019-02-25 NOTE — PROGRESS NOTES
Transfer to [facility] from Procedure Area Verbal report given to [facility] RN on Carina Slider being transferred to Cardiac Cath Lab  for routine post - op   Patient is post Leadless pacemaker and AV node ablation procedure. Patient stable upon transfer to . Report consisted of patients Situation, Background, Assessment and  
Recommendations(SBAR). Information from the following report(s) Procedure Summary, Intake/Output, MAR and Cardiac Rhythm Paced was reviewed with the receiving nurse. Opportunity for questions and clarification was provided. Patient medicated during procedure with orders obtained and verified by Dr. Shireen Cobos. Refer to patient PROCEDURE REPORT for vital signs, assessment, status, and response during procedure.

## 2019-02-25 NOTE — PROGRESS NOTES
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 
 patient seems to be in agreement as well No oac candidate given frequent falls Start asa Subjective:  
Cardiac ROS: Patient denies any exertional chest pain, dyspnea, palpitations, syncope, orthopnea, edema or paroxysmal nocturnal dyspnea. Review of Systems:  
Pertinent items are noted in the History of Present Illness. Objective: No intake/output data recorded. 1901 -  0700 In: -  
Out: 100 [facility] Telemetry: normal sinus rhythm Physical Exam: 
Visit Vitals /86 (BP 1 Location: Right arm, BP Patient Position: At rest;Supine) Pulse 66 Temp 97.5 °F (36.4 °C) Resp 16 Ht 6' 2\" (1.88 m) Wt 95.8 kg (211 lb 3.2 oz) SpO2 94% BMI 27.12 kg/m² Neck: no JVD Heart: regular rate and rhythm Lungs: diminished breath sounds R anterior, L anterior Abdomen: soft, non-tender. Bowel sounds normal. No masses,  no organomegaly Extremities: no edema Additional comments: None Care Plan discussed with: 
  Comments Patient Family RN Care Manager Consultant:     
 
Data Review: No results for input(s): TNIPOC in the last 72 hours. No lab exists for component: ITNL Recent Labs  
  19 
3843 TROIQ <0.05 Recent Labs  
  198 19 
2953  137  
K 4.2 3.8 * 106 CO2 20* 27 BUN 18 23* CREA 0.94 1.20 GLU 95 101* ALB  --  3.2* WBC  --  5.7 HGB  --  12.5 HCT  --  39.5 PLT  --  222 Recent Labs  
  19 INR 1.1 PTP 10.9 Medications reviewed Current Facility-Administered Medications Medication Dose Route Frequency  sodium chloride (NS) flush 5-40 mL  5-40 mL IntraVENous Q8H  
 sodium chloride (NS) flush 5-40 mL  5-40 mL IntraVENous PRN  
 ceFAZolin (ANCEF) 2 g/20 mL in sterile water IV syringe  2 g IntraVENous ONCE  
 sodium chloride (NS) flush 5-40 mL  5-40 mL IntraVENous Q8H  
 sodium chloride (NS) flush 5-40 mL  5-40 mL IntraVENous PRN  
 sodium chloride (NS) flush 5-40 mL  5-40 mL IntraVENous Q8H  
 sodium chloride (NS) flush 5-40 mL  5-40 mL IntraVENous PRN  
 acetaminophen (TYLENOL) tablet 650 mg  650 mg Oral Q4H PRN  
 HYDROcodone-acetaminophen (NORCO) 5-325 mg per tablet 1 Tab  1 Tab Oral Q4H PRN  
 morphine injection 1 mg  1 mg IntraVENous Q4H PRN  
 naloxone (NARCAN) injection 0.4 mg  0.4 mg IntraVENous PRN  
 ondansetron (ZOFRAN ODT) tablet 4 mg  4 mg Oral Q4H PRN  
 bisacodyl (DULCOLAX) tablet 5 mg  5 mg Oral DAILY PRN  pravastatin (PRAVACHOL) tablet 40 mg  40 mg Oral DAILY  tamsulosin (FLOMAX) capsule 0.4 mg  0.4 mg Oral DAILY  levothyroxine (SYNTHROID) tablet 150 mcg  150 mcg Oral ACB  hydrALAZINE (APRESOLINE) 20 mg/mL injection 10 mg  10 mg IntraVENous Q6H PRN  
 bacitracin 500 unit/gram ointment   Topical TID Data Reviewed: current meds, labs,recent radiology, intake/output/weight and problem list reviewed Curt Byrd MD

## 2019-02-25 NOTE — PROCEDURES
Cardiac Procedure Note Patient: Delroy Walton  MRN: [id number]  SSN: [id number] YOB: 1931 Age: 80 y.o. Sex: male Date of Procedure: 2/25/2019 Pre-procedure Diagnosis: paroxysmal atrial flutter, fibrillation with rapid ventricular rate and hypotension, dizziness, falls with injury Post-procedure Diagnosis: same Procedure: 1. Implant leadless pacemaker 2. Ablation of atrioventricular node :  Dr. Creig Galeazzi, MD 
 
Assistant(s):  None Anesthesia: Moderate Sedation by anesthesia Estimated Blood Loss: Less than 10 mL Specimens Removed: None Findings: complete av block post ablation Right groin has contrast extravasation but no bleeding noted BP was transiently low CBC sent Venogram of the right femoral and iliac vein many times did not show leaking Right groin soft and need to lay flat 3 hours so no ultrasound for now Complications: None Implants:  [employer] micra leadless pacer Signed by:  Creig Galeazzi, MD  2/25/2019  1:07 PM

## 2019-02-25 NOTE — PROGRESS NOTES
Cardiac Cath Lab Procedure Area Arrival Note: 
 
Elkin Louis arrived to Cardiac Cath Lab, Procedure Area. Patient identifiers verified with NAME and DATE OF BIRTH. Procedure verified with patient. Consent forms verified. Allergies verified. Patient informed of procedure and plan of care. Questions answered with review. Patient voiced understanding of procedure and plan of care. Patient on cardiac monitor, non-invasive blood pressure, SPO2 monitor. On room air. Placed on   O2 @ 3 pm via nasal cannula. IV of NS on pump at 25 ml/hr. Patient status doing well without problems. Patient is A&Ox 4. Patient reports no pain. Patient medicated during procedure with orders obtained and verified by Dr. Khai Dean. Refer to patients Cardiac Cath Lab PROCEDURE REPORT for vital signs, assessment, status, and response during procedure, printed at end of case. Printed report on chart or scanned into chart.

## 2019-02-25 NOTE — ANESTHESIA POSTPROCEDURE EVALUATION
Post-Anesthesia Evaluation and Assessment Patient: Namita Thompson MRN: [id number]  SSN: [id number] YOB: 1931  Age: 80 y.o. Sex: male I have evaluated the patient and they are stable and ready for discharge from the PACU. Cardiovascular Function/Vital Signs Visit Vitals /78 (BP 1 Location: Right arm, BP Patient Position: At rest) Pulse 91 Temp 36.6 °C (97.9 °F) Resp 19 Ht 6' 2\" (1.88 m) Wt 95.8 kg (211 lb 3.2 oz) SpO2 99% BMI 27.12 kg/m² Patient is status post * No anesthesia type entered * anesthesia for Procedure(s): 
INSERT OR REPLACE TRANSCATH PPM LEADLESS ABLATION AV NODE. Nausea/Vomiting: None Postoperative hydration reviewed and adequate. Pain: 
Pain Scale 1: Numeric (0 - 10) (02/25/19 1301) Pain Intensity 1: 0 (02/25/19 1301) Managed Neurological Status:  
Neuro Neurologic State: Alert (02/25/19 0756) Orientation Level: Oriented to time;Oriented to situation;Oriented to person (02/25/19 0756) Cognition: Appropriate safety awareness (02/25/19 0756) Speech: Clear (02/25/19 0756) At baseline Mental Status, Level of Consciousness: Alert and  oriented to person, place, and time Pulmonary Status:  
O2 Device: Nasal cannula (02/25/19 1304) Adequate oxygenation and airway patent Complications related to anesthesia: None Post-anesthesia assessment completed. No concerns Signed By: Michelle Serrano MD   
 February 25, 2019 Procedure(s): 
INSERT OR REPLACE TRANSCATH PPM LEADLESS ABLATION AV NODE. 
 
<BSHSIANPOST> Visit Vitals /78 (BP 1 Location: Right arm, BP Patient Position: At rest) Pulse 91 Temp 36.6 °C (97.9 °F) Resp 19 Ht 6' 2\" (1.88 m) Wt 95.8 kg (211 lb 3.2 oz) SpO2 99% BMI 27.12 kg/m²

## 2019-02-25 NOTE — PROGRESS NOTES
TRANSFER - OUT REPORT: 
 
Verbal report given to Angélica(name) on Umair Seeds  being transferred to Cath lab(unit) for routine progression of care Report consisted of patients Situation, Background, Assessment and  
Recommendations(SBAR). Information from the following report(s) SBAR and MAR was reviewed with the receiving nurse. Lines:  
Peripheral IV 02/22/19 Left Antecubital (Active) Site Assessment Clean, dry, & intact 2/25/2019  4:55 AM  
Phlebitis Assessment 0 2/25/2019  4:55 AM  
Infiltration Assessment 0 2/25/2019  4:55 AM  
Dressing Status Clean, dry, & intact 2/25/2019  4:55 AM  
Dressing Type Transparent 2/25/2019  4:55 AM  
Hub Color/Line Status Capped 2/25/2019  4:55 AM  
Action Taken Open ports on tubing capped 2/25/2019  4:55 AM  
Alcohol Cap Used Yes 2/25/2019  4:55 AM  
  
 
Opportunity for questions and clarification was provided. Patient transported with: 
 Minutizer

## 2019-02-25 NOTE — PROGRESS NOTES
NUTRITION COMPLETE ASSESSMENT 
 
RECOMMENDATIONS:  
1. Continue current diet, Supplements (specify) 2. Please document po intake in flow sheets 3. Weekly weights Interventions/Plan:  
Food/Nutrient Delivery:           RD to add supplements Assessment:  
Reason for Assessment:  
[x]BPA/MST Referral  
 
Diet:  Cardiac, Consistent Carb 2000 kcal 
Supplements: None Nutritionally Significant Medications: [x] Reviewed & Includes: Synthroid Meal Intake: No data found. Pre-Hospitalization: 
Usual Appetite: Fair Current Hospitalization:  
Fluid Restriction:  none Appetite: Fair to good PO Ability: Independent Average po intake:50-75% Average supplements intake:  n/a Subjective: 
Pt [place]; mostly spoke with family Objective: 
Pt seen for MST. Pt admitted with fall, back pain, Afib. PMHx: hypertension, hyperlipidemia, hyperparathyroidism, prostate cancer. Reviewed chart, spoke with family. Per family pt with decreased appetite PTA; pt has lost ~20# over the past 2 months. Currently pt consuming 50-75% meals; family also bringing in some food. Drinks Ensure at home. Will add Ensure BID (likes chocolate) which provides 700 kcal, 40g protein meeting 34% caloric, 42% protein needs if 100% consumed. Patient meets criteria for Moderate Protein Calorie Malnutrition as evidenced by:  
ASPEN Malnutrition Criteria Acute Illness, Chronic Illness, or Social/Enviornmental: Acute illness Energy Intake: <75% est energy req for > 7 days Weight Loss: Greater than 7.5% x 3 mos ASPEN Malnutrition Score - Acute Illness: 7 Acute Illness - Malnutrition Diagnosis: Moderate malnutrition. RD to follow po intake, wt status. Estimated Nutrition Needs:  
Kcals/day: 2044 Kcals/day(7663-8074 kcal/day (MSJ x 1.2-1.3)) Protein: 96 g(96-105g/day (1-1.1g/kg)) Fluid: 2100 ml(1mL/kcal) Based On: [place] 1898 Weight Used: Actual wt Pt expected to meet estimated nutrient needs:  []   Yes     []  No [x] Unable to predict at this time Nutrition Diagnosis:  
1. Unintended weight loss related to fair to poor po intake PTA as evidenced by pt with ~20# wt loss over past 2 months Goals: Pt will consume at least 50% meals, supplements over next 5-7 days Monitoring & Evaluation: - Total energy intake, Liquid meal replacement - Weight/weight change -   
 
Previous Nutrition Goals Met:   N/A Previous Recommendations:    N/A Education & Discharge Needs: 
 [x] None Identified 
 [] Identified and addressed  
 [] Participated in care plan, discharge planning, and/or interdisciplinary rounds Cultural, Sikhism and ethnic food preferences identified: None Skin Integrity: [x]Intact  []Other Edema: [x]None []Other Last BM: 2/23/2019 Food Allergies: [x]None []Other Diet Restrictions: Cultural/Hindu Preference(s): None Anthropometrics:   
Weight Loss Metrics 2/25/2019 12/13/2018 4/21/2016 4/21/2016 3/29/2016 9/25/2015 5/22/2015 Today's Wt 211 lb 3.2 oz 230 lb 237 lb 239 lb 240 lb 8 oz 248 lb 249 lb BMI 27.12 kg/m2 29.53 kg/m2 30.42 kg/m2 32.41 kg/m2 32.61 kg/m2 33.63 kg/m2 33.76 kg/m2 Weight Source: Bed Height: 6' 2\" (188 cm), Body mass index is 27.12 kg/m². ,   
 ,   
 
Labs:   
Lab Results Component Value Date/Time Sodium 138 02/23/2019 02:28 AM  
 Potassium 4.2 02/23/2019 02:28 AM  
 Chloride 110 (H) 02/23/2019 02:28 AM  
 CO2 20 (L) 02/23/2019 02:28 AM  
 Glucose 95 02/23/2019 02:28 AM  
 BUN 18 02/23/2019 02:28 AM  
 Creatinine 0.94 02/23/2019 02:28 AM  
 Calcium 10.0 02/23/2019 02:28 AM  
 Magnesium 2.5 (H) 04/21/2016 07:06 PM  
 Phosphorus 2.1 (L) 04/21/2016 07:06 PM  
 Albumin 3.2 (L) 02/22/2019 09:18 AM  
 
Lab Results Component Value Date/Time  Hemoglobin A1c 6.0 (H) 08/14/2009 04:35 AM  
 
 
 
Luisa Wu RD

## 2019-02-25 NOTE — PROGRESS NOTES
Hospitalist Progress Note Martha Garcia MD 
Answering service: [phone] from in house phone Cell: [phone] Date of Service:  2019 NAME:  Lisa Cross :  3/12/1931 MRN:  [id number] Admission Summary: An 80-year-old gentleman with a significant history of hypertension, hyperlipidemia, hyperparathyroidism, prostate cancer, and pituitary gland disease, presented to the emergency room after a fall. He fell after a dizzy spell on a concrete floor on his back. He bled from his posterior scalp. He is not sure about loss of consciousness. Most of the history was from his wife and daughter. The patient is hard of hearing. In the ED, he was evaluated with a CAT scan of the head that was negative for an acute intracranial abnormality. He also underwent CAT scan of the cervical spine that showed no acute abnormality. Lumbar spine CT showed mildly displaced fracture of the anterior inferior corner of L1 with a thin fracture extending into the superior endplate and multilevel spondylosis. While he was admitted to the medical floor and while he was on a remote telemonitor, it was detected the patient developed atrial fibrillation. Heart rate was in 120s briefly, but was down to up to 60s. He did not require any rate control medicine. A stat EKG was done that did not capture atrial fibrillation or atrial flutter and his blood pressure was high and he was moved to Intermediate Care Unit. Due to new-onset atrial fibrillation/flutter without any clear precipitating factor, would like to rule out pulmonary embolism and an acute pulmonary disease. A CT angiogram of the chest was obtained, which was negative for blood clots. The patient was moved from the medical to [facility] bed, as he is anticipated to require recurrent blood pressure check.   For most of the day, the patient's blood pressure initially was 166/75, subsequently stayed in the 180s, came down to 168 and had a one time dose of clonidine 2 p.m. and his blood pressure has now come down briefly was 85/62 but correct one was 141/84, so the most recent blood pressure at this time of dictation is 140/84; since his heart rate is slowing down, the Lopressor that was ordered was discontinued. I have consulted Cardiology, who has already seen the patient. He had undergone echocardiogram, which is not reported yet. The patient reassessed on the unit. He has history of cervical spine surgery by Spine Surgery and lower back by Orthopedic Surgery. Interval history / Subjective:  
  F/u fall Just woke up, confused Back pain on movement Assessment & Plan:  
 
Fall, questionable loss of consciousness. 
-The patient falls frequently at home, reportedly likely sec to Orthostatic hypotension 
-CT head 2/22 unremarkable 
-Xray lumbar spine 2/22 Possible new compression deformities of the L1 and L2 vertebral bodies 
-CT lumbar spine 2/22 Mildly displaced fracture of the anterior inferior corner of L1. A thin fracture extension extends to the superior endplate as well 
-Echo EF 46-50% with no RWMA. Aortic valve sclerosis -PT/OT 
-Appreciate neurosurgery, recommended brace when out of bed. Outpatient follow up with Dr Nicholas Jarrell Neurology Questionable new onset atrial flutter/fibrillation 
-?tachybrady syndrome 
-currently sinus  
-Appreciate Cardiology, may need pacemaker? today AMS 
-?acute hospital delirium 
-According to the daughter the patient does not have a diagnosis of Dementia. Does not forget except for occasional forgetfulness of day 
-CT head unremarkable 
-Appreciate Neurology, probably from concussion, no work up needed. Now probably close to baseline Uncontrolled hypertension -now improved 
-monitor Cholelithiasis 
-non tender 
-Outpatient follow up Scalp laceration is minimal 
-wound care. Hypothyroidism 
-on synthroid -TSH elevated, will increase the dose of synthroid History of prostate cancer Regular diet Code status: Partial CODE 
DVT prophylaxis: scd PTA: home Plan: Follow Cardiology Care Plan discussed with: Patient/Family Disposition: TBD Hospital Problems  Date Reviewed: 4/21/2016 Codes Class Noted POA Back pain ICD-10-CM: M54.9 ICD-9-CM: 724.5  2/22/2019 Yes Atrial flutter ([address] ICD-10-CM: H22.67 
ICD-9-CM: 427.32  2/22/2019 Unknown Syncope ICD-10-CM: R55 
ICD-9-CM: 780.2  2/22/2019 Unknown Accelerated hypertension ICD-10-CM: I10 
ICD-9-CM: 401.0  2/22/2019 Unknown * (Principal) Fall ICD-10-CM: W19. Jania Arango ICD-9-CM: E888.9  2/22/2019 Unknown Review of Systems: A comprehensive review of systems was negative except for that written in the HPI. Vital Signs:  
 Last 24hrs VS reviewed since prior progress note. Most recent are: 
Visit Vitals /86 (BP 1 Location: Right arm, BP Patient Position: At rest;Supine) Pulse 66 Temp 97.5 °F (36.4 °C) Resp 16 Ht 6' 2\" (1.88 m) Wt 95.8 kg (211 lb 3.2 oz) SpO2 94% BMI 27.12 kg/m² Intake/Output Summary (Last 24 hours) at 2/25/2019 7382 Last data filed at 2/25/2019 5460 Gross per 24 hour Intake  Output 540 ml Net -540 ml Physical Examination:  
 
 
     
Constitutional:  No acute distress, cooperative, pleasant   
ENT:  Oral mucous moist, oropharynx benign. Neck supple, Resp:  CTA bilaterally. No wheezing/rhonchi/rales. No accessory muscle use CV:  Regular rhythm, normal rate, no murmurs, gallops, rubs GI:  Soft, non distended, non tender. normoactive bowel sounds, no hepatosplenomegaly Musculoskeletal:  No edema, warm, 2+ pulses throughout Neurologic:  Moves all extremities. AAOx0, CN II-XII reviewed Skin:  Good turgor, no rashes or ulcers Data Review: Review and/or order of clinical lab test 
 
 
Labs:  
 
Recent Labs  
  02/22/19 
3784 WBC 5.7 HGB 12.5 HCT 39.5  Recent Labs  
  02/23/19 
0228 02/22/19 
2534  137  
K 4.2 3.8 * 106 CO2 20* 27 BUN 18 23* CREA 0.94 1.20 GLU 95 101* CA 10.0 11.0* Recent Labs  
  02/22/19 
1269 SGOT 25 ALT 19  
* TBILI 1.3* TP 8.9* ALB 3.2*  
GLOB 5.7* Recent Labs  
  02/23/19 
0228 INR 1.1 PTP 10.9 No results for input(s): FE, TIBC, PSAT, FERR in the last 72 hours. Lab Results Component Value Date/Time Folate 18.6 02/22/2019 09:18 AM  
  
No results for input(s): PH, PCO2, PO2 in the last 72 hours. Recent Labs  
  02/22/19 
1021 TROIQ <0.05 Lab Results Component Value Date/Time Cholesterol, total 143 03/22/2016 10:21 AM  
 HDL Cholesterol 53 03/22/2016 10:21 AM  
 LDL, calculated 78 03/22/2016 10:21 AM  
 Triglyceride 60 03/22/2016 10:21 AM  
 CHOL/HDL Ratio 2.7 06/23/2010 09:08 AM  
 
Lab Results Component Value Date/Time Glucose (POC) 105 (H) 02/23/2019 06:29 AM  
 Glucose (POC) 97 12/12/2012 10:06 AM  
 
Lab Results Component Value Date/Time Color DARK YELLOW 12/13/2018 01:18 PM  
 Appearance CLEAR 12/13/2018 01:18 PM  
 Specific gravity 1.021 12/13/2018 01:18 PM  
 pH (UA) 6.0 12/13/2018 01:18 PM  
 Protein 30 (A) 12/13/2018 01:18 PM  
 Glucose NEGATIVE  12/13/2018 01:18 PM  
 Ketone TRACE (A) 12/13/2018 01:18 PM  
 Bilirubin NEGATIVE  10/10/2018 09:54 AM  
 Urobilinogen >8.0 (H) 12/13/2018 01:18 PM  
 Nitrites NEGATIVE  12/13/2018 01:18 PM  
 Leukocyte Esterase NEGATIVE  12/13/2018 01:18 PM  
 Epithelial cells FEW 12/13/2018 01:18 PM  
 Bacteria NEGATIVE  12/13/2018 01:18 PM  
 WBC 0-4 12/13/2018 01:18 PM  
 RBC 0-5 12/13/2018 01:18 PM  
 
 
 
Medications Reviewed:  
 
Current Facility-Administered Medications Medication Dose Route Frequency  sodium chloride (NS) flush 5-40 mL  5-40 mL IntraVENous Q8H  
  sodium chloride (NS) flush 5-40 mL  5-40 mL IntraVENous PRN  
 ceFAZolin (ANCEF) 2 g/20 mL in sterile water IV syringe  2 g IntraVENous ONCE  
 sodium chloride (NS) flush 5-40 mL  5-40 mL IntraVENous Q8H  
 sodium chloride (NS) flush 5-40 mL  5-40 mL IntraVENous PRN  
 sodium chloride (NS) flush 5-40 mL  5-40 mL IntraVENous Q8H  
 sodium chloride (NS) flush 5-40 mL  5-40 mL IntraVENous PRN  
 acetaminophen (TYLENOL) tablet 650 mg  650 mg Oral Q4H PRN  
 HYDROcodone-acetaminophen (NORCO) 5-325 mg per tablet 1 Tab  1 Tab Oral Q4H PRN  
 morphine injection 1 mg  1 mg IntraVENous Q4H PRN  
 naloxone (NARCAN) injection 0.4 mg  0.4 mg IntraVENous PRN  
 ondansetron (ZOFRAN ODT) tablet 4 mg  4 mg Oral Q4H PRN  
 bisacodyl (DULCOLAX) tablet 5 mg  5 mg Oral DAILY PRN  pravastatin (PRAVACHOL) tablet 40 mg  40 mg Oral DAILY  tamsulosin (FLOMAX) capsule 0.4 mg  0.4 mg Oral DAILY  levothyroxine (SYNTHROID) tablet 150 mcg  150 mcg Oral ACB  hydrALAZINE (APRESOLINE) 20 mg/mL injection 10 mg  10 mg IntraVENous Q6H PRN  
 bacitracin 500 unit/gram ointment   Topical TID  
 
______________________________________________________________________ EXPECTED LENGTH OF STAY: 2d 4h 
ACTUAL LENGTH OF STAY:          3 Haley Chakraborty MD

## 2019-02-25 NOTE — PROGRESS NOTES
Problem: Falls - Risk of 
Goal: *Absence of Falls Document Kayla Sutherland Fall Risk and appropriate interventions in the flowsheet. Outcome: Progressing Towards Goal 
Fall Risk Interventions: 
Mobility Interventions: Assess mobility with egress test 
 
Mentation Interventions: Adequate sleep, hydration, pain control Medication Interventions: Patient to call before getting OOB Elimination Interventions: Bed/chair exit alarm History of Falls Interventions: Bed/chair exit alarm Problem: Pressure Injury - Risk of 
Goal: *Prevention of pressure injury Document Jayro Scale and appropriate interventions in the flowsheet. Outcome: Progressing Towards Goal 
Pressure Injury Interventions: 
Sensory Interventions: Assess changes in LOC Moisture Interventions: Absorbent underpads Activity Interventions: PT/OT evaluation Mobility Interventions: Assess need for specialty bed Nutrition Interventions: Document food/fluid/supplement intake Friction and Shear Interventions: Lift sheet

## 2019-02-25 NOTE — ANESTHESIA PREPROCEDURE EVALUATION
Anesthetic History No history of anesthetic complications Review of Systems / Medical History Patient summary reviewed, nursing notes reviewed and pertinent labs reviewed Pulmonary Within defined limits Neuro/Psych Within defined limits Cardiovascular Within defined limits Hypertension Dysrhythmias : atrial fibrillation Exercise tolerance: >4 METS 
  
GI/Hepatic/Renal 
  
GERD Endo/Other Hypothyroidism: well controlled Arthritis Other Findings Comments: hypertension, hyperlipidemia, hyperparathyroidism, prostate cancer, and pituitary gland disease Physical Exam 
 
Airway Mallampati: II 
TM Distance: > 6 cm Neck ROM: normal range of motion Mouth opening: Normal 
 
 Cardiovascular Murmur: Grade 2, Mitral area Dental 
 
Dentition: Poor dentition and Upper partial plate Pulmonary Breath sounds clear to auscultation Abdominal 
GI exam deferred Other Findings Anesthetic Plan ASA: 3 Anesthesia type: general and MAC Induction: Intravenous Anesthetic plan and risks discussed with: Patient

## 2019-02-25 NOTE — PROGRESS NOTES
TRANSFER - IN REPORT: 
 
Verbal report received from Joelle(name) on Christiano Baird  being received from Cath lab(unit) for routine progression of care Report consisted of patients Situation, Background, Assessment and  
Recommendations(SBAR). Information from the following report(s) SBAR and Cardiac Rhythm Paced was reviewed with the receiving nurse. Opportunity for questions and clarification was provided. Assessment completed upon patients arrival to unit and care assumed.

## 2019-02-25 NOTE — PROGRESS NOTES
TRANSFER - OUT REPORT: 
 
Verbal report given to ELOY Garza(name) on Adrianna Lewis  being transferred to 3(unit) for routine progression of care Report consisted of patients Situation, Background, Assessment and  
Recommendations(SBAR). Information from the following report(s) Procedure Summary, Intake/Output, MAR and Recent Results was reviewed with the receiving nurse. Lines:  
Peripheral IV 02/22/19 Left Antecubital (Active) Site Assessment Clean, dry, & intact 2/25/2019  4:55 AM  
Phlebitis Assessment 0 2/25/2019  4:55 AM  
Infiltration Assessment 0 2/25/2019  4:55 AM  
Dressing Status Clean, dry, & intact 2/25/2019  4:55 AM  
Dressing Type Transparent 2/25/2019  4:55 AM  
Hub Color/Line Status Capped 2/25/2019  4:55 AM  
Action Taken Open ports on tubing capped 2/25/2019  4:55 AM  
Alcohol Cap Used Yes 2/25/2019  4:55 AM  
  
 
Opportunity for questions and clarification was provided. Patient transported with: 
 Monitor Registered Nurse

## 2019-02-25 NOTE — PROGRESS NOTES
Cardiac Electrophysiology Progress Note 2/25/2019 12:02 PM 
 
Admit Date: 2/22/2019 Admit Diagnosis: Back pain [M54.9]; Fall [W19. Sammy Gourd; Syncope [R55]; Atrial flutter ([facility] [address] [I48.92]; Accelerated hypertension [I10] Subjective:  
 
Sharmin Richter was admitted on 02/22/2019 after ground level fall, was pleasantly confused thereafter. Paroxysmal atrial flutter with RVR was noted. Per family, this is new. Troponin WNL. TSH elevated. 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 No OAC due to frequent falls. Orthostatic hypotension noted when up with PT over the weekend, resolved once back in bed. HR jumped from 60s-70s at rest to 140s. Orthostatic hypotension could be contributing to falls. Mildly decreased LVEF. Unable to tolerate increased medications. Echo (02/22/2019): LVEF 46-50%, no RWMA. Mild AS, mild AR. Previous: 
Echo (06/17/2014): LVEF 55%, no RWMA, grade 1 diastolic dysfunction. Trivial MR. Trivial AR. Past Medical History:  
Diagnosis Date  Cancer [facility])   
 prostate  DJD (degenerative joint disease)  Elevated cholesterol  GERD (gastroesophageal reflux disease)  Hyperparathyroidism, primary ([facility] [address]  Hypertension  Obstructive sleep apnea   
 uses cpap  Thyroid disease HX of Graves Disease Past Surgical History:  
Procedure Laterality Date  HX HEENT    
 thyroidectomy  HX HEENT    
 tonsilectomy [address] [facility] [address]  HX ORTHOPAEDIC    
 right femoral neck fracture  HX ORTHOPAEDIC    
 bilateral ankle surgery  HX ORTHOPAEDIC  12/12/12 LEFT ANKLE REFUSION AND REMOVAL POSTERIOR SCREW  
 GA COLONOSCOPY FLX DX W/COLLJ SPEC WHEN PFRMD  12/11/2006 Dr Gilson Johnson  repeat 12/2011  GA PROSTATE BIOPSY, NEEDLE, SATURATION SAMPLING Social History Socioeconomic History  Marital status:  Spouse name: Not on file  Number of children: Not on file  Years of education: Not on file  Highest education level: Not on file Social Needs  Financial resource strain: Not on file  Food insecurity - worry: Not on file  Food insecurity - inability: Not on file  Transportation needs - medical: Not on file  Transportation needs - non-medical: Not on file Occupational History  Not on file Tobacco Use  Smoking status: Never Smoker  Smokeless tobacco: Never Used Substance and Sexual Activity  Alcohol use: Yes Comment: rare  Drug use: No  
 Sexual activity: Not on file Other Topics Concern  Not on file Social History Narrative  Not on file Visit Vitals /71 (BP 1 Location: Right arm, BP Patient Position: At rest) Pulse 90 Temp 97.9 °F (36.6 °C) Resp 23 Ht 6' 2\" (1.88 m) Wt 211 lb 3.2 oz (95.8 kg) SpO2 98% BMI 27.12 kg/m² Current Facility-Administered Medications Medication Dose Route Frequency  aspirin delayed-release tablet 81 mg  81 mg Oral DAILY  sodium chloride (NS) flush 5-40 mL  5-40 mL IntraVENous Q8H  
 sodium chloride (NS) flush 5-40 mL  5-40 mL IntraVENous PRN  
 ceFAZolin (ANCEF) 2 g/20 mL in sterile water IV syringe  2 g IntraVENous Q8H  
 [START ON 2/26/2019] cephALEXin (KEFLEX) capsule 250 mg  250 mg Oral TID  sodium chloride (NS) flush 5-40 mL  5-40 mL IntraVENous Q8H  
 sodium chloride (NS) flush 5-40 mL  5-40 mL IntraVENous PRN  
 acetaminophen (TYLENOL) tablet 650 mg  650 mg Oral Q4H PRN  
 HYDROcodone-acetaminophen (NORCO) 5-325 mg per tablet 1 Tab  1 Tab Oral Q4H PRN  
 morphine injection 1 mg  1 mg IntraVENous Q4H PRN  
 naloxone (NARCAN) injection 0.4 mg  0.4 mg IntraVENous PRN  
 ondansetron (ZOFRAN ODT) tablet 4 mg  4 mg Oral Q4H PRN  
 bisacodyl (DULCOLAX) tablet 5 mg  5 mg Oral DAILY PRN  pravastatin (PRAVACHOL) tablet 40 mg  40 mg Oral DAILY  tamsulosin (FLOMAX) capsule 0.4 mg  0.4 mg Oral DAILY  levothyroxine (SYNTHROID) tablet 150 mcg  150 mcg Oral ACB  hydrALAZINE (APRESOLINE) 20 mg/mL injection 10 mg  10 mg IntraVENous Q6H PRN  
 bacitracin 500 unit/gram ointment   Topical TID Objective:  
  
Physical Exam: 
Visit Vitals /71 (BP 1 Location: Right arm, BP Patient Position: At rest) Pulse 90 Temp 97.9 °F (36.6 °C) Resp 23 Ht 6' 2\" (1.88 m) Wt 211 lb 3.2 oz (95.8 kg) SpO2 98% BMI 27.12 kg/m² General Appearance:  Well developed, well nourished,alert and individual in no acute distress. Ears/Nose/Mouth/Throat:   Hearing (difficult) Neck: Supple. Chest:   Lungs clear to auscultation bilaterally. Cardiovascular:  irregular rhythm, 2/6 LSB murmur. Abdomen:   Soft, non-tender Extremities: No edema bilaterally. Skin: Warm and dry. Data Review:  
Labs:   
Recent Results (from the past 24 hour(s)) TYPE & SCREEN Collection Time: 02/25/19 11:57 AM  
Result Value Ref Range Crossmatch Expiration 02/28/2019 ABO/Rh(D) A POSITIVE Antibody screen NEG   
CBC W/O DIFF Collection Time: 02/25/19 12:05 PM  
Result Value Ref Range WBC 5.3 4.1 - 11.1 K/uL  
 RBC 3.44 (L) 4.10 - 5.70 M/uL  
 HGB 11.1 (L) 12.1 - 17.0 g/dL HCT 35.8 (L) 36.6 - 50.3 % .1 (H) 80.0 - 99.0 FL  
 MCH 32.3 26.0 - 34.0 PG  
 MCHC 31.0 30.0 - 36.5 g/dL  
 RDW 14.5 11.5 - 14.5 % PLATELET 570 242 - 076 K/uL MPV 10.9 8.9 - 12.9 FL  
 NRBC 0.0 0  WBC ABSOLUTE NRBC 0.00 0.00 - 0.01 K/uL Telemetry: normal sinus rhythm PACs and PVCs Assessment:  
 
Principal Problem: 
  Fall (2/22/2019) Active Problems: 
  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 Tomi Garcia has AF/AFL with RVR, a new diagnosis for him. Difficult to rate control r/t hypotension. Orthostatic hypotension with low dose lisinopril & Toprol XL, supine hypertension. LVEF mildly reduced. Poor rate control. Frequent falls, unable to use [address] [place] [address] Road. Cardioversion is not a good option since unable to coagulate. Risks/benefits of AV node ablation & leadless pacemaker reviewed with patient & family member. They agreed to proceed. Addendum from EP attending: 
 I have seen, examined patient, and discussed with nurse practitioner, registered nurse, reviewed, updated note and agree with the assessment and plan I have talked to him and his daughters this am. He was dizzy with low BP 
PAF/flutter RVR, had transient sinus bradycardia but not severe, frequent PVC and PAC Vital signs as above Exam shows irregular rhythm Lungs clear Assessment and Plan: 
He does not tolerate rate control medication, dizzy with orthostatic hypotension and this may have contributed to his traumatic fall No anticoagulation I recommended and family agreed with pacemaker and av node ablation and they prefer leadless pacemaker, concerned about pocket infection with him Ramo Iqbal M.D. [facility] Electrophysiology/Cardiology [address] [facility] [place] [address], [place] [address], [facility] [address], [address] [place] [address] 
[phone]

## 2019-02-25 NOTE — PROGRESS NOTES
Occupational Therapy 9190 -  
[phone] Orders acknowledged and chart reviewed. Patient being transferred GERALDO to cath lab for PPM placement. Will f/u POD #1 for OT evaluation. Thank you. Ld Mcdonald MS, OTR/L

## 2019-02-25 NOTE — PROGRESS NOTES
Physical Therapy Pt currently off the floor for pacemaker. Will f/u as able and appropriate. Thank you, Hakeem Muniz, PT, DPT

## 2019-02-25 NOTE — PROGRESS NOTES
Bedside shift change report given to Yas Beatty (oncoming nurse) by Saji Medrano (offgoing nurse). Report included the following information SBAR, Kardex, Procedure Summary, Intake/Output, MAR, Recent Results and Med Rec Status.

## 2019-02-26 NOTE — PROGRESS NOTES
Problem: Mobility Impaired (Adult and Pediatric) Goal: *Acute Goals and Plan of Care (Insert Text) Physical Therapy Goals Revisited 2/26/2019, goals remain appropriate, carry over and add 5. Patient/family will verbalize adhere to leadless pacer precautions, per a literature search it was revealed as follows: no lifting greater than 10 pounds, no excessive exertion, and no swimming and use of baths for 7 days. Physical Therapy Goals Initiated 2/24/2019 1. Patient will move from supine to sit and sit to supine  and scoot up and down in bed with supervision/set-up within 7 days. 2. Patient will perform sit to stand with supervision/set-up within 7 days. 3. Patient will ambulate with supervision/set-up for 100 feet with the least restrictive device within 7 days. 4. Patient will verbalize and demonstrate understanding of spinal precautions (No bending, lifting greater than 5 lbs, or twisting; log-roll technique; frequent repositioning as instructed) within 7 days. physical Therapy REEVALUATION Patient: Gm Petty (51 y.o. male) Date: 2/26/2019 Primary Diagnosis: Back pain [M54.9] Fall [W19. Ana Maria Favor Syncope [R55] Atrial flutter ([address] [I48.92] Accelerated hypertension [I10] Procedure(s) (LRB): 
INSERT OR REPLACE TRANSCATH PPM LEADLESS (N/A) ABLATION AV NODE (N/A) 1 Day Post-Op Precautions:   Fall, Bed Alarm(leadless pacer precatuions) Chart, physical therapy assessment, plan of care and goals were reviewed. ASSESSMENT : 
Based on the objective data described below, the patient presents with low BP at rest and orthostatic hypotension with progression of activity (and symptomatic) requiring return to bed to manage. Did attempt to leave pt in bed to chair position post session but even that BP was low, so moved his bed back to supine position, discussed with his nurse. Overall for mobility pt required min to mod assist X 2 and did not do any amb.  He was total assist for management of quick draw brace. His daughter was present throughout session. Disposition depending on progress but pt's daughter expressed likelihood that pt will decline rehab. If he is discharged to home he will needs hands on assist 24/7 and not to be left alone on his level of the home, his apartment in the basement.  
 
 
 
 
 
            
      Vitals:  
  [phone] 02/26/19 0954 02/26/19 1002 02/26/19 1004 02/26/19 1008 BP: 109/70 99/68 86/55 123/64 (!) 66/31 Comment: BP did not read (!) 80/55 BP 1 Location: right arm Right arm Right arm Right arm Right arm Right arm Left arm BP Patient Position: Supine pre activity Sitting EOB sitting post stand Supine; Post activity Comment: modified chair position Comment: bed to near chair position Comment: bed to near chair position Pulse: 90 90 102 65 70 (!) 58 92 Resp:                
Temp:                
SpO2: on room air 95%              
             
 
 
Patient will benefit from skilled intervention to address the above impairments. Patients rehabilitation potential is considered to be Good Factors which may influence rehabilitation potential include:  
[]           None noted 
[x]           Mental ability/status [x]           Medical condition 
[x]           Home/family situation and support systems 
[]           Safety awareness 
[]           Pain tolerance/management 
[]           Other: PLAN : 
Recommendations and Planned Interventions: 
[x]             Bed Mobility Training             []      Neuromuscular Re-Education [x]             Transfer Training                   []      Orthotic/Prosthetic Training 
[x]             Gait Training                         []      Modalities [x]             Therapeutic Exercises           []      Edema Management/Control [x]             Therapeutic Activities            [x]      Patient and Family Training/Education []             Other (comment): 
 Frequency/Duration: Patient will be followed by physical therapy 5 times a week to address goals. Discharge Recommendations: Rehab, Home Health and To Be Determined Further Equipment Recommendations for Discharge: none if rehab, but if discharged to home I recommend a bedside commode. SUBJECTIVE:  
Patient stated I feel dizzy, after sit to stand OBJECTIVE DATA SUMMARY:  
Chart checked, pt cleared by nursing. Quick draw brace donned in sitting, total assist 
Past Medical History:  
Diagnosis Date  Cancer [facility])   
 prostate  DJD (degenerative joint disease)  Elevated cholesterol  GERD (gastroesophageal reflux disease)  Hyperparathyroidism, primary ([facility] [address]  Hypertension  Obstructive sleep apnea   
 uses cpap  Thyroid disease HX of Graves Disease Past Surgical History:  
Procedure Laterality Date  HX HEENT    
 thyroidectomy  HX HEENT    
 tonsilectomy [address] [address]  HX ORTHOPAEDIC    
 right femoral neck fracture  HX ORTHOPAEDIC    
 bilateral ankle surgery  HX ORTHOPAEDIC  12/12/12 LEFT ANKLE REFUSION AND REMOVAL POSTERIOR SCREW  
 WA COLONOSCOPY FLX DX W/COLLJ SPEC WHEN PFRMD  12/11/2006 Dr Rashard Mccarthy  repeat 12/2011  WA ICAR CATHETER ABLATION ATRIOVENTR NODE FUNCTION N/A 2/25/2019 ABLATION AV NODE performed by Jose Angel Somers MD at [address] [address] CATH LAB  WA PROSTATE BIOPSY, NEEDLE, SATURATION SAMPLING    
 [place] TRANSCATH INSERT OR REPLACE LEADLESS PM VENTR N/A 2/25/2019 INSERT OR REPLACE TRANSCATH PPM LEADLESS performed by Jose Angel Somers MD at [address] [address], [address] CATH LAB Hospital course since last seen and reason for reevaluation: leadless pacer insertion on 2/25/2019Critical Behavior: 
Neurologic State: Alert Orientation Level: Oriented to person, Oriented to place, Disoriented to situation, Disoriented to time Cognition: Appropriate for age attention/concentration, Follows commands, Impaired decision making Safety/Judgement: Awareness of environment, Decreased awareness of need for assistance, Decreased awareness of need for safety, Fall prevention Skin:  Refer to MD and nursing notes Strength:   
  
  
  
 decreased functional, has fused ankles Tone & Sensation:  
  
  
  
  
  
  
  
  
  
   
Range Of Motion: 
  
  
 functional 
  
  
  
  
  
Coordination: 
  
Functional Mobility: 
Bed Mobility: 
  
Supine to Sit: Moderate assistance;Assist x2 Sit to Supine: Moderate assistance;Assist x2 Transfers: 
Sit to Stand: Assist x2; Moderate assistance Stand to Sit: Assist x2;Minimum assistance Balance:  
Sitting: Impaired; Without support Sitting - Static: Fair (occasional) Sitting - Dynamic: Fair (occasional) Standing: Impaired; With support Standing - Static: Constant support;Poor Standing - Dynamic : PoorAmbulation/Gait Training:  
  
  
  
  
  
  
  
  
  
  
  
  
  
  
  
   
Therapeutic Exercises:  
 
 
Functional Measure: 
Tinetti test: 
 
Sitting Balance: 1 Arises: 0 Attempts to Rise: 0 Immediate Standing Balance: 0 Standing Balance: 0 Nudged: 0 Eyes Closed: 0 Turn 360 Degrees - Continuous/Discontinuous: 0 Turn 360 Degrees - Steady/Unsteady: 0 Sitting Down: 0 Balance Score: 1 Indication of Gait: 0 
R Step Length/Height: 0 
L Step Length/Height: 0 
R Foot Clearance: 0 
L Foot Clearance: 0 Step Symmetry: 0 Step Continuity: 0 Path: 0 Trunk: 0 Walking Time: 0 Gait Score: 0 Total Score: 1 Tinetti Tool Score Risk of Falls 
<19 = High Fall Risk 19-24 = Moderate Fall Risk 25-28 = Low Fall Risk Tinetti ME. Performance-Oriented Assessment of Mobility Problems in Elderly Patients. [address] 66; [id number]. (Scoring Description: PT Bulletin Feb. 10, 1993) Older adults: Courtney Patrick et al, 2009; n = [address] [employer] elderly evaluated with ABC, LIONEL, ADL, and IADL) · Mean LIONEL score for males aged 69-68 years = 26.21(3.40) · Mean LIONEL score for females age 69-68 years = 25.16(4.30) · Mean LIONEL score for males over 80 years = 23.29(6.02) · Mean LIONEL score for females over 80 years = 17.20(8.32) Pain: 
Pain Scale 1: Numeric (0 - 10) Pain Intensity 1: 0 Activity Tolerance:  
See assessment above Please refer to the flowsheet for vital signs taken during this treatment. After treatment:  
[]  Patient left in no apparent distress sitting up in chair 
[x]  Patient left in no apparent distress in bed 
[x]  Call bell left within reach [x]  Nursing notified 
[x]  Caregiver present 
[]  Bed alarm activated COMMUNICATION/EDUCATION:  
The patients plan of care was discussed with: Occupational Therapist and Registered Nurse. [x]  Fall prevention education was provided and the patient/caregiver indicated understanding. [x]  Patient/family have participated as able in goal setting and plan of care. [x]  Patient/family agree to work toward stated goals and plan of care. []  Patient understands intent and goals of therapy, but is neutral about his/her participation. []  Patient is unable to participate in goal setting and plan of care. Thank you for this referral. 
Maryuri Andrade Time Calculation: 56 mins

## 2019-02-26 NOTE — PROGRESS NOTES
Received call from nurse re:low BP and patient being lethargy I came to reassess patient. He is sleepy. but arousable,follows commands. Exam in non focal.He was able to sit up in the bed and started drinking the milk shake his dtr brought him and became wide awake and alert. Charu Casanova Denied headache,sob or any complaints O Visit Vitals BP (!) 87/79 (BP 1 Location: Right arm, BP Patient Position: At rest) Pulse 91 Temp 98.3 °F (36.8 °C) Resp 18 Ht 6' 2\" (1.88 m) Wt 94.5 kg (208 lb 5.4 oz) SpO2 95% BMI 26.75 kg/m² Pulse,full CNS 
-Initially sleep,awoken when he became alert and awake 
-Power 5/5 A/P Hypotension,h/o orthostatic hypotension. Signs of clinical dehydration. Random cortisol 11.9 
-Saline bolus. Maintenance 100 cc/hr. I and O 
-Albumin x4 doses  
-Started on midodrine. random cortisol 11. Discussed with patient,his wife and daughter. Answered their questions to their apparent staisfaction,no concerns expressed.  
Kody Garza MD

## 2019-02-26 NOTE — PROGRESS NOTES
Care Management Interventions PCP Verified by CM: Yes 
Palliative Care Criteria Met (RRAT>21 & CHF Dx)?: No 
Mode of Transport at Discharge: Other (see comment)(His daughter provides transportation) Transition of Care Consult (CM Consult): Discharge Planning MyChart Signup: No 
Discharge Durable Medical Equipment: No 
Health Maintenance Reviewed: Yes Physical Therapy Consult: Yes Occupational Therapy Consult: Yes Speech Therapy Consult: No 
Current Support Network: Lives with Caregiver Confirm Follow Up Transport: Family Plan discussed with Pt/Family/Caregiver: Yes [employer] Information Provided?: No 
Discharge Location Discharge Placement: Unable to determine at this time Reason for Admission:   Fall , and pacemaker placed RRAT Score:     14 Do you (patient/family) have any concerns for transition/discharge? Discharge placement Plan for utilizing home health: Therapy recommending IPR vs SNF. Likelihood of readmission?   low Transition of Care Plan:       
Chart reviwed for transitions of care, and discussed in rounds. CM met with patient and his daughter, Harsha Henry at bedside to explain role and offer support. Harsha Henry confirmed demographics, and patient lives with her. He gets his prescriptions filled at [facility] in [place]. Therapy is recommending IPR vs. SNF pending patient progress. CM to provide family with rehab choice when patient is ambulating with therapy to determine discharge disposition.  
Vlad Vasquez [facility]

## 2019-02-26 NOTE — PROGRESS NOTES
Bedside and Verbal shift change report given to [facility] (oncoming nurse) by Virgie Brown (offgoing nurse). Report included the following information SBAR and MAR.

## 2019-02-26 NOTE — PROGRESS NOTES
Dr. Charmayne Angst plans noted I will continue to follow from a distance and remain available as needed

## 2019-02-26 NOTE — PROGRESS NOTES
PT re eval performed, note to follow. Mary Gomez, PT Vitals:  
 [phone] 02/26/19 0954 02/26/19 1002 02/26/19 1004 02/26/19 1008 BP: 109/70 99/68 86/55 123/64 (!) 66/31 Comment: BP did not read (!) 80/55 BP 1 Location: right arm Right arm Right arm Right arm Right arm Right arm Left arm BP Patient Position: Supine pre activity Sitting EOB sitting post stand Supine; Post activity Comment: modified chair position Comment: bed to near chair position Comment: bed to near chair position Pulse: 90 90 102 65 70 (!) 58 92 Resp:         
Temp:         
SpO2: on room air 95%

## 2019-02-26 NOTE — PROGRESS NOTES
Bedside shift change report given to Hannah Rivas (oncoming nurse) by Edwin Castrejon (offgoing nurse). Report included the following information SBAR, Intake/Output, MAR, Accordion, Med Rec Status, Alarm Parameters  and Quality Measures.

## 2019-02-26 NOTE — PROGRESS NOTES
Hospitalist Progress Note Tami Frye MD 
Answering service: [phone] or [phone] from in house phone Date of Service:  2019 NAME:  Roderick Lyons :  3/12/1931 MRN:  [id number] Admission Summary: An 59-year-old gentleman with a significant history of hypertension, hyperlipidemia, hyperparathyroidism, prostate cancer, and pituitary gland disease, presented to the emergency room after a fall. He fell after a dizzy spell on a concrete floor on his back. He bled from his posterior scalp. He is not sure about loss of consciousness. Most of the history was from his wife and daughter. The patient is hard of hearing. In the ED, he was evaluated with a CAT scan of the head that was negative for an acute intracranial abnormality. He also underwent CAT scan of the cervical spine that showed no acute abnormality. Lumbar spine CT showed mildly displaced fracture of the anterior inferior corner of L1 with a thin fracture extending into the superior endplate and multilevel spondylosis. While he was admitted to the medical floor and while he was on a remote telemonitor, it was detected the patient developed atrial fibrillation. Heart rate was in 120s briefly, but was down to up to 60s. He did not require any rate control medicine. A stat EKG was done that did not capture atrial fibrillation or atrial flutter and his blood pressure was high and he was moved to Intermediate Care Unit. Due to new-onset atrial fibrillation/flutter without any clear precipitating factor, would like to rule out pulmonary embolism and an acute pulmonary disease. A CT angiogram of the chest was obtained, which was negative for blood clots. The patient was moved from the medical to [facility] bed, as he is anticipated to require recurrent blood pressure check.   For most of the day, the patient's blood pressure initially was 166/75, subsequently stayed in the 180s, came down to 168 and had a one time dose of clonidine 2 p.m. and his blood pressure has now come down briefly was 85/62 but correct one was 141/84, so the most recent blood pressure at this time of dictation is 140/84; since his heart rate is slowing down, the Lopressor that was ordered was discontinued. I have consulted Cardiology, who has already seen the patient. He had undergone echocardiogram, which is not reported yet. The patient reassessed on the unit. He has history of cervical spine surgery by Spine Surgery and lower back by Orthopedic Surgery. Interval history / Subjective:  
  F/u fall Daughter in the room. Patient hypotensive. Denies dizziness or chest pressure. Assessment & Plan: Hypotension/orthostatic. He has chronic h/o orthostatic hypotension. Fabio Sharif looks volume depleted as well which might have contributed to his hypotension. -Afebrile,not septic and infection is not suspected 
-Bolus saline. Start low dose midodrine. Check cortisol. 
-Discussed with pt/daughter/nurse. Questionable new onset atrial flutter/fibrillation and tachybrady syndrome,unable to tolerate rate control medications due to hypotension 
-S/P PPM  On 2/25 Fall, questionable loss of consciousness. 
-The patient falls frequently at home, reportedly likely sec to Orthostatic hypotension 
-CT head 2/22 unremarkable 
-Xray lumbar spine 2/22 Possible new compression deformities of the L1 and L2 vertebral bodies 
-CT lumbar spine 2/22 Mildly displaced fracture of the anterior inferior corner of L1. A thin fracture extension extends to the superior endplate as well 
-Echo EF 46-50% with no RWMA. Aortic valve sclerosis -PT/OT 
-Appreciate neurosurgery, recommended brace when out of bed. Outpatient follow up with Dr Sherie Gorman Neurology [facility] 
-?acute hospital delirium 
-According to the daughter the patient does not have a diagnosis of Dementia. Does not forget except for occasional forgetfulness of day 
-CT head unremarkable 
-Appreciate Neurology, probably from concussion, no work up needed. Now probably close to baseline Uncontrolled hypertension -now hypotensive Cholelithiasis 
-non tender 
-Outpatient follow up Scalp laceration is minimal 
-wound care. Hypothyroidism 
-on synthroid -TSH elevated, synthroid increased 125 to 150 mcg. Recheck TSH in 4-6 weeks. History of prostate cancer Regular diet Code status: Partial CODE 
DVT prophylaxis: scd PTA: home Plan: Follow Cardiology Care Plan discussed with: Patient/Family Disposition: TBD Hospital Problems  Date Reviewed: 2/25/2019 Codes Class Noted POA Pacemaker ICD-10-CM: Z95.0 ICD-9-CM: V45.01  2/25/2019 Unknown Overview Signed 2/25/2019  1:06 PM by Kira Amador MD  
  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 
ICD-9-CM: 427.32  2/22/2019 Unknown Syncope ICD-10-CM: R55 
ICD-9-CM: 780.2  2/22/2019 Unknown Accelerated hypertension ICD-10-CM: I10 
ICD-9-CM: 401.0  2/22/2019 Unknown * (Principal) Fall ICD-10-CM: W19. Irene Elm ICD-9-CM: E888.9  2/22/2019 Unknown Review of Systems: A comprehensive review of systems was negative except for that written in the HPI. Vital Signs:  
 Last 24hrs VS reviewed since prior progress note. Most recent are: 
Visit Vitals /59 (BP 1 Location: Left arm, BP Patient Position: At rest) Pulse 95 Temp 98 °F (36.7 °C) Resp 16 Ht 6' 2\" (1.88 m) Wt 94.5 kg (208 lb 5.4 oz) SpO2 95% BMI 26.75 kg/m² Intake/Output Summary (Last 24 hours) at 2/26/2019 0825 Last data filed at 2/26/2019 9677 Gross per 24 hour Intake 740 ml  
 Output 300 ml Net 440 ml Physical Examination:  
 
 
     
Constitutional:  Alert and awake.hard of hearing. ENT:  Oral mucous moist, oropharynx benign. Neck supple, Resp:  CTA bilaterally. No wheezing/rhonchi/rales. No accessory muscle use CV:  Regular rhythm, normal rate, no murmurs, gallops, rubs GI:  Soft, non distended, non tender. normoactive bowel sounds, no hepatosplenomegaly Musculoskeletal:  No edema, warm, 2+ pulses throughout Neurologic:  Moves all extremities. AAOx0, CN II-XII reviewed Skin:  Good turgor, no rashes or ulcers Data Review:  
 Review and/or order of clinical lab test 
 
 
Labs:  
 
Recent Labs  
  02/26/19 
0518 02/25/19 
1205 WBC 7.6 5.3 HGB 11.5* 11.1*  
HCT 35.8* 35.8*  241 No results for input(s): NA, K, CL, CO2, BUN, CREA, GLU, CA, MG, PHOS, URICA in the last 72 hours. No results for input(s): SGOT, GPT, ALT, AP, TBIL, TBILI, TP, ALB, GLOB, GGT, AML, LPSE in the last 72 hours. No lab exists for component: AMYP, HLPSE No results for input(s): INR, PTP, APTT in the last 72 hours. No lab exists for component: INREXT, INREXT No results for input(s): FE, TIBC, PSAT, FERR in the last 72 hours. Lab Results Component Value Date/Time Folate 18.6 02/22/2019 09:18 AM  
  
No results for input(s): PH, PCO2, PO2 in the last 72 hours. No results for input(s): CPK, CKNDX, TROIQ in the last 72 hours. No lab exists for component: CPKMB Lab Results Component Value Date/Time Cholesterol, total 143 03/22/2016 10:21 AM  
 HDL Cholesterol 53 03/22/2016 10:21 AM  
 LDL, calculated 78 03/22/2016 10:21 AM  
 Triglyceride 60 03/22/2016 10:21 AM  
 CHOL/HDL Ratio 2.7 06/23/2010 09:08 AM  
 
Lab Results Component Value Date/Time Glucose (POC) 105 (H) 02/23/2019 06:29 AM  
 Glucose (POC) 97 12/12/2012 10:06 AM  
 
Lab Results Component Value Date/Time  Color DARK YELLOW 12/13/2018 01:18 PM  
 Appearance CLEAR 12/13/2018 01:18 PM  
 Specific gravity 1.021 12/13/2018 01:18 PM  
 pH (UA) 6.0 12/13/2018 01:18 PM  
 Protein 30 (A) 12/13/2018 01:18 PM  
 Glucose NEGATIVE  12/13/2018 01:18 PM  
 Ketone TRACE (A) 12/13/2018 01:18 PM  
 Bilirubin NEGATIVE  10/10/2018 09:54 AM  
 Urobilinogen >8.0 (H) 12/13/2018 01:18 PM  
 Nitrites NEGATIVE  12/13/2018 01:18 PM  
 Leukocyte Esterase NEGATIVE  12/13/2018 01:18 PM  
 Epithelial cells FEW 12/13/2018 01:18 PM  
 Bacteria NEGATIVE  12/13/2018 01:18 PM  
 WBC 0-4 12/13/2018 01:18 PM  
 RBC 0-5 12/13/2018 01:18 PM  
 
 
 
Medications Reviewed:  
 
Current Facility-Administered Medications Medication Dose Route Frequency  aspirin delayed-release tablet 81 mg  81 mg Oral DAILY  sodium chloride (NS) flush 5-40 mL  5-40 mL IntraVENous Q8H  
 sodium chloride (NS) flush 5-40 mL  5-40 mL IntraVENous PRN  
 cephALEXin (KEFLEX) capsule 250 mg  250 mg Oral TID  sodium chloride (NS) flush 5-40 mL  5-40 mL IntraVENous Q8H  
 sodium chloride (NS) flush 5-40 mL  5-40 mL IntraVENous PRN  
 acetaminophen (TYLENOL) tablet 650 mg  650 mg Oral Q4H PRN  
 HYDROcodone-acetaminophen (NORCO) 5-325 mg per tablet 1 Tab  1 Tab Oral Q4H PRN  
 morphine injection 1 mg  1 mg IntraVENous Q4H PRN  
 naloxone (NARCAN) injection 0.4 mg  0.4 mg IntraVENous PRN  
 ondansetron (ZOFRAN ODT) tablet 4 mg  4 mg Oral Q4H PRN  
 bisacodyl (DULCOLAX) tablet 5 mg  5 mg Oral DAILY PRN  pravastatin (PRAVACHOL) tablet 40 mg  40 mg Oral DAILY  tamsulosin (FLOMAX) capsule 0.4 mg  0.4 mg Oral DAILY  levothyroxine (SYNTHROID) tablet 150 mcg  150 mcg Oral ACB  hydrALAZINE (APRESOLINE) 20 mg/mL injection 10 mg  10 mg IntraVENous Q6H PRN  
 bacitracin 500 unit/gram ointment   Topical TID  
 
______________________________________________________________________ EXPECTED LENGTH OF STAY: 2d 4h 
ACTUAL LENGTH OF STAY:          4 Akosua Braswell MD

## 2019-02-26 NOTE — PROGRESS NOTES
Problem: Self Care Deficits Care Plan (Adult) Goal: *Acute Goals and Plan of Care (Insert Text) Occupational Therapy Goals Initiated 2/26/2019 1. Patient will perform grooming sitting EOB with supervision/set-up within 7 day(s). 2.  Patient will perform upper body ADLs with supervision/set-up within 7 day(s). 3.  Patient will perform lower body ADLs with minimal assistance/contact guard assist within 7 day(s). 4.  Patient will perform toilet transfers with minimal assistance/contact guard assist within 7 day(s). 5.  Patient will perform all aspects of toileting with minimal assistance/contact guard assist within 7 day(s). 6.  Patient will participate in upper extremity therapeutic exercise/activities with supervision/set-up for 5 minutes within 7 day(s). 7.  Patient will utilize energy conservation techniques during functional activities with verbal cues within 7 day(s). Occupational Therapy EVALUATION Patient: Raenette Boxer (20 y.o. male) Date: 2/26/2019 Primary Diagnosis: Back pain [M54.9] Fall [W19. العراقي Roughen Syncope [R55] Atrial flutter ([address] [I48.92] Accelerated hypertension [I10] Procedure(s) (LRB): 
INSERT OR REPLACE TRANSCATH PPM LEADLESS (N/A) ABLATION AV NODE (N/A) 1 Day Post-Op Precautions:  Fall, Bed Alarm(leadless pacer precautions) ASSESSMENT : 
Based on the objective data described below, the patient presents with Minimum assistance upper body ADLs, Moderate Assistance - Total assistance lower body ADLs, and Moderate Assistance with additional time and Assist x2 for functional mobility. Patient with noted L1 fx and insertion of leadless PPM POD 1. Patient functioning well below baseline and is not safe to return home at this time 2* level of assist and high fall risk. The following are barriers to independence while in acute care:  
- Cognitive and/or behavioral: processing, initiation, sequencing, safety awareness and insight into deficits - Medical condition: ROM, strength, functional reach, functional endurance, sitting balance, standing balance, orthostatic hypotension symptomatic, precautions and medical history   
- Other:    
 
Patient will benefit from skilled acute intervention to address the above impairments. Patients rehabilitation potential is considered to be Fair Discharge recommendations: Rehab at inpatient facility: patient can tolerate 3 hours of therapy vs. Rehab at skilled nursing facility (SNF): patient is working towards tolerating 3 hours of therapy Barriers to discharging home, in addition to above listed impairments: family availability to assist history of falls level of physical assist required to maintain patient safety. Equipment recommendations for successful discharge (if) home: TBD by rehab PLAN : 
Recommendations and Planned Interventions: self care training, functional mobility training, therapeutic exercise, balance training, therapeutic activities, endurance activities, patient education, home safety training and family training/education Frequency/Duration: Patient will be followed by occupational therapy 5 times a week to address goals. SUBJECTIVE:  
Patient stated I don't hurt at all.  OBJECTIVE DATA SUMMARY:  
HISTORY:  
Past Medical History:  
Diagnosis Date  [facility])   
 prostate  DJD (degenerative joint disease)  Elevated cholesterol  GERD (gastroesophageal reflux disease)  Hyperparathyroidism, primary ([facility] [address]  Hypertension  Obstructive sleep apnea   
 uses cpap  Thyroid disease HX of Graves Disease Past Surgical History:  
Procedure Laterality Date  HX HEENT    
 thyroidectomy  HX HEENT    
 tonsilectomy [address]  HX ORTHOPAEDIC    
 right femoral neck fracture  HX ORTHOPAEDIC    
 bilateral ankle surgery  HX ORTHOPAEDIC  12/12/12  LEFT ANKLE REFUSION AND REMOVAL POSTERIOR SCREW  
  NM COLONOSCOPY FLX DX W/COLLJ SPEC WHEN PFRMD  12/11/2006 Dr Terry Howard  repeat 12/2011  NM ICAR CATHETER ABLATION ATRIOVENTR NODE FUNCTION N/A 2/25/2019 ABLATION AV NODE performed by Yas Ruiz MD at [address], [facility] Dr CATH LAB  NM PROSTATE BIOPSY, NEEDLE, SATURATION SAMPLING    
 NM TRANSCATH INSERT OR REPLACE LEADLESS PM VENTR N/A 2/25/2019 INSERT OR REPLACE TRANSCATH PPM LEADLESS performed by Yas Ruiz MD at [address], [facility] Dr CATH LAB Prior Level of Function/Environment/Context: Patient lives in lower level of daughter's home and was mostly mod I/supervision with ADLs PTA. Patient reports needing assist with bathing and lower body ADLs at baseline. Patient's daughter reports patient has been able to be left for short periods of time. Daughter very supportive and receptive to information, good advocate for patient. Home Situation Home Environment: Private residence # Steps to Enter: 0 One/Two Story Residence: Two story, live on 1st floor Living Alone: No 
Support Systems: Family member(s), Child(lexi) Patient Expects to be Discharged to[de-identified] Private residence Current DME Used/Available at Home: Safety frame toliet, Shower chair, Walker, rolling Tub or Shower Type: Shower Hand dominance: RightEXAMINATION OF PERFORMANCE DEFICITS: 
Cognitive/Behavioral Status: 
Neurologic State: Alert Orientation Level: Oriented to person;Oriented to place; Disoriented to situation;Disoriented to time Cognition: Appropriate for age attention/concentration; Follows commands; Impaired decision making Perception: Cues to maintain midline in sitting Perseveration: No perseveration noted Safety/Judgement: Awareness of environment;Decreased awareness of need for assistance;Decreased awareness of need for safety; Fall prevention Skin: Appears grossly intact Edema: none noted in BUEs Hearing: Auditory Auditory Impairment: Hard of hearing, left side, Hearing aid(s) Hearing Aids/Status: Left Vision/Perceptual:   
Tracking: Able to track stimulus in all quadrants w/o difficulty Diplopia: No   
Acuity: Impaired near vision; Impaired far vision Corrective Lenses: Reading glasses Range of Motion: In [id number] [facility] [address] [address] AROM: Generally decreased, functional 
PROM: Generally decreased, functional 
Strength: [place] [address] [facility] [address] [facility] [address] [address] Strength: Generally decreased, functional 
Coordination: 
Coordination: Generally decreased, functional 
Fine Motor Skills-Upper: Left Intact; Right Intact Gross Motor Skills-Upper: Left Intact; Right Intact Tone & Sensation: In [address] [place] [address] Road Tone: Normal 
Sensation: Intact Balance: 
Sitting: Impaired; Without support Sitting - Static: Fair (occasional) Sitting - Dynamic: Fair (occasional) Standing: Impaired; With support Standing - Static: Constant support;Poor Standing - Dynamic : Poor Functional Mobility and Transfers for ADLs:Bed Mobility: 
Rolling: Moderate assistance; Additional time Supine to Sit: Moderate assistance;Assist x2 Sit to Supine: Moderate assistance;Assist x2 Transfers: 
Sit to Stand: Assist x2; Moderate assistance Stand to Sit: Assist x2;Minimum assistance Bed to Chair: (NT 2* orthostatics) Toilet Transfer : (NT 2* orthostatics) ADL Assessment: 
Feeding: Minimum assistance(Infer to open containers and s/u tray) Oral Facial Hygiene/Grooming: Minimum assistance(Infer 2* FM/GM coordination and functional reach) Bathing: Moderate assistance(Infer min A upper and max A lower 2* balance, func reach) Upper Body Dressing: Minimum assistance(infer 2* func reach, BUE ROM) Lower Body Dressing: Total assistance Toileting: Maximum assistance(Infer per obs of func mob, balance, strength, func reach) ADL Intervention and task modifications: 
Feeding Container Management: Total assistance (dependent) Cutting Food: Maximum assistance Utensil Management: Supervision/set-up Food to Mouth: Supervision/set-up Drink to Mouth: Supervision/set-up Cues: Physical assistance;Verbal cues provided Lower Body Dressing Assistance Socks: Total assistance (dependent) Leg Crossed Method Used: No 
Position Performed: Long sitting on bed Cues: Don;Physical assistance Cognitive Retraining Safety/Judgement: Awareness of environment;Decreased awareness of need for assistance;Decreased awareness of need for safety; Fall prevention Functional Measure: 
Barthel Index: 
 
Bathin Bladder: 5 Bowels: 5 Groomin Dressin Feedin Mobility: 0 Stairs: 0 Toilet Use: 0 Transfer (Bed to Chair and Back): 0 Total: 15/100 Indicating ~80% impairment in basic ADL ability. The Barthel ADL Index: Guidelines 1. The index should be used as a record of what a patient does, not as a record of what a patient could do. 2. The main aim is to establish degree of independence from any help, physical or verbal, however minor and for whatever reason. 3. The need for supervision renders the patient not independent. 4. A patient's performance should be established using the best available evidence. Asking the patient, friends/relatives and nurses are the usual sources, but direct observation and common sense are also important. However direct testing is not needed. 5. Usually the patient's performance over the preceding 24-48 hours is important, but occasionally longer periods will be relevant. 6. Middle categories imply that the patient supplies over 50 per cent of the effort. 7. Use of aids to be independent is allowed. Marcos Beach., Barthel, DJenaeW. (1537). Functional evaluation: the Barthel Index. [facility] (14)2. GEORGIANA Laguna, Jen Reno., Charis Apley., Liz Davis, [address] [place] [address] (). Measuring the change indisability after inpatient rehabilitation; comparison of the responsiveness of the Barthel Index and Functional Dulce Measure. Journal of Neurology, Neurosurgery, and Psychiatry, 66(4), 720-847. MARTIN Perez, NAVEEN Martinez, & Juliana Palafox M.A. (2004.) Assessment of post-stroke quality of life in cost-effectiveness studies: The usefulness of the Barthel Index and the EuroQoL-5D. [facility], 13, [phone] Occupational Therapy Evaluation Charge Determination History Examination Decision-Making LOW Complexity : Brief history review  MEDIUM Complexity : 3-5 performance deficits relating to physical, cognitive , or psychosocial skils that result in activity limitations and / or participation restrictions HIGH Complexity : Patient presents with comorbidities that affect occupational performance. Signifigant modification of tasks or assistance (eg, physical or verbal) with assessment (s) is necessary to enable patient to complete evaluation Based on the above components, the patient evaluation is determined to be of the following complexity level: LOW Pain: 
Pre treatment: 0 /10 During treatment: 0/10 Post treatment:  0/10 Location: none Description:none Aggravating factors: Activity Tolerance:  
Fair and signs and symptoms of orthostatic hypotension Please refer to the flowsheet for vital signs taken during this treatment. After treatment patient left:  
Supine in bed Bed alarm/tab alert on Call light within reach RN notified Family at bedside Side rails x 3  
COMMUNICATION/EDUCATION:  
The patients plan of care was discussed with: Physical Therapist and Registered Nurse. Home safety education was provided and the patient/caregiver indicated understanding. and Patient/family have participated as able in goal setting and plan of care. This patients plan of care is appropriate for delegation to [facility]. Thank you for this referral. 
Jonna Tracy OT Time Calculation: 56 mins

## 2019-02-26 NOTE — PROGRESS NOTES
Orders received, chart reviewed and patient evaluated by occupational therapy. Recommend patient to discharge to rehab (IPR vs. SNF) pending progression with skilled acute occupational therapy. Recommend with nursing patient to complete as able in order to maintain strength, endurance and independence: bed to chair position for meals, ADLs with supervision/setup and mobilizing self in bed in prep for toileting with 2 assist. Thank you for your assistance. Full evaluation to follow.

## 2019-02-26 NOTE — PROGRESS NOTES
Problem: Falls - Risk of 
Goal: *Absence of Falls Document Maryjovita Carballoil Fall Risk and appropriate interventions in the flowsheet. Outcome: Progressing Towards Goal 
Fall Risk Interventions: 
Mobility Interventions: Bed/chair exit alarm Mentation Interventions: Adequate sleep, hydration, pain control Medication Interventions: Bed/chair exit alarm Elimination Interventions: Call light in reach History of Falls Interventions: Bed/chair exit alarm Problem: Pressure Injury - Risk of 
Goal: *Prevention of pressure injury Document Jayro Scale and appropriate interventions in the flowsheet. Outcome: Progressing Towards Goal 
Pressure Injury Interventions: 
Sensory Interventions: Assess changes in LOC Moisture Interventions: Absorbent underpads Activity Interventions: Increase time out of bed Mobility Interventions: HOB 30 degrees or less Nutrition Interventions: Document food/fluid/supplement intake Friction and Shear Interventions: HOB 30 degrees or less

## 2019-02-26 NOTE — CDMP QUERY
#2 
 
 
Patient admitted with falls  noted to have moderate malnutrition with normal BMI  by nutritional consult. If possible, please document in progress notes and d/c summary if you are evaluating and/or treating any of the following: 
 
=> Moderate protein-calorie malnutrition 
=> Mild protein-calorie malnutrition 
=> Other explanation of clinical findings 
=> Clinically Undetermined (no explanation for clinical findings) The medical record reflects the following: 
   Risk Factors: weight loss Clinical Indicators: Objective: 
Pt seen for MST. Pt admitted with fall, back pain, Afib. PMHx: hypertension, hyperlipidemia, hyperparathyroidism, prostate cancer. Reviewed chart, spoke with family. Per family pt with decreased appetite PTA; pt has lost ~20# over the past 2 months. Currently pt consuming 50-75% meals; family also bringing in some food. Drinks Ensure at home.  
  
Will add Ensure BID (likes chocolate) which provides 700 kcal, 40g protein meeting 34% caloric, 42% protein needs if 100% consumed.   
  
Patient meets criteria for Moderate Protein Calorie Malnutrition as evidenced by:  
ASPEN Malnutrition Criteria Acute Illness, Chronic Illness, or Social/Enviornmental: Acute illness Energy Intake: <75% est energy req for > 7 days Weight Loss: Greater than 7.5% x 3 mos ASPEN Malnutrition Score - Acute Illness: 7 Acute Illness - Malnutrition Diagnosis: Moderate malnutrition. Treatment: nutritional consult, ensure Thank you, 
       Katarina Sueippo [facility], [address] [facility]

## 2019-02-26 NOTE — PROGRESS NOTES
Cardiac Electrophysiology Wound Check Note Wound Check Patient is seen for wound check, is s/p AV node ablation & leadless pacemaker on 02/25/2019 for symptomatic dizziness with hypotension & RVR during AF/AFL, unable to coagulate due to frequent falls. Device check this morning shows proper function. Hgb checked this morning due to contrast extravasation during procedure (but no bleeding or leak on venogram), 11.5, up slightly from yesterday afternoon. No fever, drainage. Physical Exam  
Constitutional: well-developed and well-nourished. Skin: Right femoral access site without drainage or hematoma. Suture easily removed. ASSESSMENT and PLAN  
 
addendum I was present with NP today and have reviewed pacer check No bleeding in groin Mr. Laura Pantoja is s/p AV node ablation & leadless pacemaker on 02/25/2019, remained stable overnight. Device check today shows proper function. Keflex 250 mg po tid x 5 days for post surgical infection prophylaxis. Activity restrictions & wound care instructions detailed in discharge instructions. Follow up in EP clinic for wound & device check as noted below. LRL currently 90 bpm, anticipate decrease to 70 bpm in the future. Future Appointments Date Time Provider Quiana Wray 3/1/2019  8:45 AM PACEMAKER3, [address] [facility]  
3/1/2019  9:00 AM Bhavik Hdez  [address] [facility], [facility] 
02/26/19 Kelsey Tipton M.D. [facility] Electrophysiology/Cardiology [address] [facility] [place] [address], [address] [place] [address] [id number] [place] [address] 
[phone]                                        [phone]

## 2019-02-26 NOTE — PROGRESS NOTES
Pt more lethargic, BP low, MD in to see patient, 500 ml bolus NS ordered and given. Pt's lower extremities mottled.

## 2019-02-27 NOTE — PROGRESS NOTES
Therapy recommending rehab vs home health, cm will continue to follow.   
 
Tiffanie Alberts [facility]

## 2019-02-27 NOTE — PROGRESS NOTES
Physical Therapy: PT attempted to see patient this am.  Daughter present and notes that patient recently got back to bed from being up in chair earlier am, but encouraged patient to get up with therapy for further assessment to aide in disposition planning. Daughter again expressed concern over patient declining recommendation for rehab at d/c based on PT evaluation. Discussed with patient and encouraged him to work with therapy to further assess his abilities/limitations with gait and activity tolerance. Patient laughed, pulled up his covers and went back to sleep. Will try to see patient later today as able and appropriate.  
 
Any Baez, MS, PT

## 2019-02-27 NOTE — PROGRESS NOTES
Problem: Mobility Impaired (Adult and Pediatric) Goal: *Acute Goals and Plan of Care (Insert Text) Physical Therapy Goals Revisited 2/26/2019, goals remain appropriate, carry over and add 5. Patient/family will verbalize adhere to leadless pacer precautions, per a literature search it was revealed as follows: no lifting greater than 10 pounds, no excessive exertion, and no swimming and use of baths for 7 days. Physical Therapy Goals Initiated 2/24/2019 1. Patient will move from supine to sit and sit to supine  and scoot up and down in bed with supervision/set-up within 7 days. 2. Patient will perform sit to stand with supervision/set-up within 7 days. 3. Patient will ambulate with supervision/set-up for 100 feet with the least restrictive device within 7 days. 4. Patient will verbalize and demonstrate understanding of spinal precautions (No bending, lifting greater than 5 lbs, or twisting; log-roll technique; frequent repositioning as instructed) within 7 days. physical Therapy TREATMENT Patient: Lobito Acosta (53 y.o. male) Date: 2/27/2019 Diagnosis: Back pain [M54.9] Fall [W19. Tong Asp Syncope [R55] Atrial flutter ([address] [I48.92] Accelerated hypertension [I10] Fall Procedure(s) (LRB): 
INSERT OR REPLACE TRANSCATH PPM LEADLESS (N/A) ABLATION AV NODE (N/A) 2 Days Post-Op Precautions: Fall Chart, physical therapy assessment, plan of care and goals were reviewed. ASSESSMENT: 
Patient agreeable to session after much education and coaxing by PT and daughter, still stalling with attempts at using urinal in sidelying in bed. Overall MOD A to come to sitting EOB, MAX A to don LSO brace, MIN to MOD A x 2 to come to standing with initial L sided lean, able to correct with tactile cues and visual feedback from his mirror in room.   Gait training to and from doorway with RW with cues to widen stance (feet rubbing against each other in swing), and position himself evenly within the walker. Increased trunk sway as fatigued. Patient incontinent of bowel during gait, brought to [facility], completed repetitive sit to stands but MOD A for hygiene along with MIN to MOD A for trunk support in standing. Left up in chair with alarm and lunch tray set up. Discussed again with patient and daughter the strong recommendation for patient to d/c to rehab prior to home. At this time he requires 2 person assistance for safe mobility and has poor insight into his deficits. Would be a high fall risk at home and does not have sufficient assistance required at this time. Progression toward goals: 
[x]    Improving appropriately and progressing toward goals 
[]    Improving slowly and progressing toward goals 
[]    Not making progress toward goals and plan of care will be adjusted PLAN: 
Patient continues to benefit from skilled intervention to address the above impairments. Continue treatment per established plan of care. Discharge Recommendations:  Inpatient Rehab Further Equipment Recommendations for Discharge:  TBD SUBJECTIVE:  
Patient stated I've got to get to a bathroom (repeatedly stating though we were walking straight to [facility].  OBJECTIVE DATA SUMMARY:  
Critical Behavior: 
Neurologic State: Alert Orientation Level: Oriented X4 Cognition: Decreased attention/concentration, Follows commands Safety/Judgement: Awareness of environment, Decreased awareness of need for assistance, Decreased awareness of need for safety, Fall prevention Functional Mobility Training: 
Bed Mobility: 
  
Supine to Sit: Moderate assistance;Assist x2 Transfers: 
Sit to Stand: Minimum assistance;Assist x2 Stand to Sit: Minimum assistance;Assist x2 Balance: 
Sitting: Impaired Sitting - Static: Good (unsupported) Sitting - Dynamic: Fair (occasional) Standing: Impaired Standing - Static: Constant support; Fair 
Standing - Dynamic : PoorAmbulation/Gait Training: Distance (ft): 30 Feet (ft) Assistive Device: Gait belt;Walker, rolling Ambulation - Level of Assistance: Minimal assistance;Assist x2 Gait Abnormalities: Decreased step clearance; Path deviations;Trunk sway increased(near scissoring) Base of Support: Center of gravity altered;Narrowed Speed/Allison: Slow;Shuffled Step Length: Right shortened;Left shortened Pain: 
Pain Scale 1: Numeric (0 - 10) Pain Intensity 1: 0 Activity Tolerance: Low - 30' gait with 2 person MIN/MOD A Please refer to the flowsheet for vital signs taken during this treatment. After treatment:  
[x]    Patient left in no apparent distress sitting up in chair 
[]    Patient left in no apparent distress in bed 
[x]    Call bell left within reach [x]    Nursing notified 
[x]    Caregiver present [x]    Bed alarm activated COMMUNICATION/COLLABORATION:  
The patients plan of care was discussed with: Registered Nurse Clarissa Latif, PT Time Calculation: 31 mins

## 2019-02-27 NOTE — PROGRESS NOTES
Problem: Falls - Risk of 
Goal: *Absence of Falls Document Orvel Buffy Fall Risk and appropriate interventions in the flowsheet. Outcome: Progressing Towards Goal 
Fall Risk Interventions: 
Mobility Interventions: Communicate number of staff needed for ambulation/transfer Mentation Interventions: Bed/chair exit alarm Medication Interventions: Evaluate medications/consider consulting pharmacy, Bed/chair exit alarm Elimination Interventions: Call light in reach, Urinal in reach, Toileting schedule/hourly rounds History of Falls Interventions: Bed/chair exit alarm, Door open when patient unattended, Consult care management for discharge planning Problem: Pressure Injury - Risk of 
Goal: *Prevention of pressure injury Document Jayro Scale and appropriate interventions in the flowsheet. Outcome: Progressing Towards Goal 
Pressure Injury Interventions: 
Sensory Interventions: Assess changes in LOC Moisture Interventions: Absorbent underpads, Apply protective barrier, creams and emollients, Assess need for specialty bed Activity Interventions: PT/OT evaluation Mobility Interventions: PT/OT evaluation, HOB 30 degrees or less, Turn and reposition approx. every two hours(pillow and wedges) Nutrition Interventions: Document food/fluid/supplement intake Friction and Shear Interventions: HOB 30 degrees or less

## 2019-02-27 NOTE — PROGRESS NOTES
The patient was very lethargic and difficult to arouse. Oriented to name only. Follows some commands to move extremities. Dr. Elizabeth Cota was notified and into see the patient. Bolus  cc X1 given. . RL started at 100 cc per hour. Albumin 50 cc given IV. The patient became more alert and responsive. Able to follow commands to DURAN.

## 2019-02-27 NOTE — PROGRESS NOTES
Problem: Falls - Risk of 
Goal: *Absence of Falls Document Hamp [place] Fall Risk and appropriate interventions in the flowsheet. Outcome: Progressing Towards Goal 
Fall Risk Interventions: 
Mobility Interventions: Communicate number of staff needed for ambulation/transfer, Patient to call before getting OOB, Utilize walker, cane, or other assistive device Mentation Interventions: Bed/chair exit alarm, Door open when patient unattended, More frequent rounding Medication Interventions: Patient to call before getting OOB, Teach patient to arise slowly Elimination Interventions: Patient to call for help with toileting needs, Bed/chair exit alarm, Call light in reach, Toileting schedule/hourly rounds, Urinal in reach History of Falls Interventions: Bed/chair exit alarm, Door open when patient unattended Problem: Pressure Injury - Risk of 
Goal: *Prevention of pressure injury Document Jayro Scale and appropriate interventions in the flowsheet. Outcome: Progressing Towards Goal 
Pressure Injury Interventions: 
Sensory Interventions: Keep linens dry and wrinkle-free, Minimize linen layers, Pressure redistribution bed/mattress (bed type), Turn and reposition approx. every two hours (pillows and wedges if needed) Moisture Interventions: Offer toileting Q_hr, Check for incontinence Q2 hours and as needed Activity Interventions: Increase time out of bed, Pressure redistribution bed/mattress(bed type) Mobility Interventions: Pressure redistribution bed/mattress (bed type) Nutrition Interventions: Document food/fluid/supplement intake Friction and Shear Interventions: Lift sheet, Transferring/repositioning devices

## 2019-02-27 NOTE — PROGRESS NOTES
Per PT notes they are recommending rehab for the patient. CM met with his sister, Sorin Gaona at bedside to provide her list of SNF/IPR. He has been to [facility] in the past. Referral sent.  
 
Adriano Nicole [facility]

## 2019-02-28 NOTE — PROGRESS NOTES
Problem: Mobility Impaired (Adult and Pediatric) Goal: *Acute Goals and Plan of Care (Insert Text) Physical Therapy Goals Revisited 2/26/2019, goals remain appropriate, carry over and add 5. Patient/family will verbalize adhere to leadless pacer precautions, per a literature search it was revealed as follows: no lifting greater than 10 pounds, no excessive exertion, and no swimming and use of baths for 7 days. Physical Therapy Goals Initiated 2/24/2019 1. Patient will move from supine to sit and sit to supine  and scoot up and down in bed with supervision/set-up within 7 days. 2. Patient will perform sit to stand with supervision/set-up within 7 days. 3. Patient will ambulate with supervision/set-up for 100 feet with the least restrictive device within 7 days. 4. Patient will verbalize and demonstrate understanding of spinal precautions (No bending, lifting greater than 5 lbs, or twisting; log-roll technique; frequent repositioning as instructed) within 7 days. physical Therapy TREATMENT Patient: Diana King (51 y.o. male) Date: 2/28/2019 Diagnosis: Back pain [M54.9] Fall [W19. Hildegard Covert Syncope [R55] Atrial flutter ([address] [I48.92] Accelerated hypertension [I10] Fall Procedure(s) (LRB): 
INSERT OR REPLACE TRANSCATH PPM LEADLESS (N/A) ABLATION AV NODE (N/A) 3 Days Post-Op Precautions: Fall Chart, physical therapy assessment, plan of care and goals were reviewed. ASSESSMENT: 
The patient presents with min Ax2 for sit to stand. Gait  80 feet ambulated, and with brace/splint(LSO) , gait belt and rolling walker device. The following are barriers to independence while in acute care:  
-Cognitive and/or behavioral: processing, safety awareness, insight into deficits and insight into abilities -Medical condition: strength, standing balance, cardiopulmonary tolerance, precautions and medical history   
-Other: Prior level of function: mod I with rolling walker. Ambulating outside to get mail and newspaper PLAN: 
Patient continues to benefit from skilled intervention to address the above impairments. Continue treatment per established plan of care. Recommendations and/or planned interventions for staff: 
Gait with rolling walker and LSO. Discharge recommendations: Rehab at inpatient facility: patient can tolerate 3 hours of therapy Patient's barriers to discharging home, in addition to above impairments: family availability to assist history of falls total assist driving to follow up medical appointment(s)/groceries/obtain medication entry and exit into the home, patient will require physical assist from medical transport/ambulance family availability for education/training to then follow up at home level of physical assist required to maintain patient safety. Equipment recommendations for successful discharge (if) home: SUBJECTIVE:  
Patient stated I feel better.  OBJECTIVE DATA SUMMARY:  
Critical Behavior: 
Neurologic State: Alert, Confused Orientation Level: Oriented X4 Cognition: Follows commands Safety/Judgement: Awareness of environment, Decreased awareness of need for assistance, Decreased awareness of need for safety, Fall prevention Functional Mobility Training: 
Bed Mobility: 
  
  
  
 in chair on arrival 
  
  
Transfers: 
Sit to Stand: Minimum assistance;Assist x2 Stand to Sit: Minimum assistance;Assist x2 Balance: 
Standing: Impaired Standing - Static: Fair Standing - Dynamic : FairAmbulation/Gait Training: 
Distance (ft): 80 Feet (ft) Assistive Device: Brace/Splint;Gait belt;Walker, rolling Ambulation - Level of Assistance: Moderate assistance(+ IV assist) Gait Abnormalities: Decreased step clearance Base of Support: Center of gravity altered Speed/Allison: Slow Step Length: Left shortened;Right shortened Stairs: 
  
  
   
 
Neuro Re-Education: 
 
Therapeutic Exercises:  
 
Pain: No complaints Activity Tolerance: No complaints Please refer to the flowsheet for vital signs taken during this treatment. After treatment patient left:  
Up in chair Caregiver at bedside Call light within reach RN notified COMMUNICATION/COLLABORATION:  
The patients plan of care was discussed with: Registered Nurse Peter Guallpa PTA Time Calculation: 15 mins

## 2019-02-28 NOTE — PROGRESS NOTES
Problem: Falls - Risk of 
Goal: *Absence of Falls Document Lynn Deng Fall Risk and appropriate interventions in the flowsheet. Outcome: Progressing Towards Goal 
Fall Risk Interventions: 
Mobility Interventions: Assess mobility with egress test 
 
Mentation Interventions: Update white board Medication Interventions: Patient to call before getting OOB Elimination Interventions: Call light in reach History of Falls Interventions: Bed/chair exit alarm Problem: Pressure Injury - Risk of 
Goal: *Prevention of pressure injury Document Jayro Scale and appropriate interventions in the flowsheet. Outcome: Progressing Towards Goal 
Pressure Injury Interventions: 
Sensory Interventions: Assess changes in LOC Moisture Interventions: Absorbent underpads Activity Interventions: Increase time out of bed Mobility Interventions: HOB 30 degrees or less Nutrition Interventions: Document food/fluid/supplement intake Friction and Shear Interventions: Apply protective barrier, creams and emollients

## 2019-02-28 NOTE — PROGRESS NOTES
CM contacted [facility] in regards to acceptance of patient for rehab, they will contact me back once MD has accepted.  
 
Dawn Wood, [facility]

## 2019-02-28 NOTE — PROGRESS NOTES
Hospitalist Progress Note Chante Avila MD 
Answering service: [phone] OR [phone] from in house phone Date of Service:  2019 NAME:  Namita Thompson :  3/12/1931 MRN:  [id number] Admission Summary: An 80-year-old gentleman with a significant history of hypertension, hyperlipidemia, hyperparathyroidism, prostate cancer, and pituitary gland disease, presented to the emergency room after a fall. He fell after a dizzy spell on a concrete floor on his back. He bled from his posterior scalp. He is not sure about loss of consciousness. Most of the history was from his wife and daughter. The patient is hard of hearing. In the ED, he was evaluated with a CAT scan of the head that was negative for an acute intracranial abnormality. He also underwent CAT scan of the cervical spine that showed no acute abnormality. Lumbar spine CT showed mildly displaced fracture of the anterior inferior corner of L1 with a thin fracture extending into the superior endplate and multilevel spondylosis. While he was admitted to the medical floor and while he was on a remote telemonitor, it was detected the patient developed atrial fibrillation. Heart rate was in 120s briefly, but was down to up to 60s. He did not require any rate control medicine. A stat EKG was done that did not capture atrial fibrillation or atrial flutter and his blood pressure was high and he was moved to Intermediate Care Unit. Due to new-onset atrial fibrillation/flutter without any clear precipitating factor, would like to rule out pulmonary embolism and an acute pulmonary disease. A CT angiogram of the chest was obtained, which was negative for blood clots. The patient was moved from the medical to [facility] bed, as he is anticipated to require recurrent blood pressure check.   For most of the day, the patient's blood pressure initially was 166/75, subsequently stayed in the 180s, came down to 168 and had a one time dose of clonidine 2 p.m. and his blood pressure has now come down briefly was 85/62 but correct one was 141/84, so the most recent blood pressure at this time of dictation is 140/84; since his heart rate is slowing down, the Lopressor that was ordered was discontinued. I have consulted Cardiology, who has already seen the patient. He had undergone echocardiogram, which is not reported yet. The patient reassessed on the unit. He has history of cervical spine surgery by Spine Surgery and lower back by Orthopedic Surgery. Interval history / Subjective:  
  F/u fall Alert and awake,doing much better . Daughter in the room,updated. She inquired about rehab on discharge. Assessment & Plan: Hypotension/orthostatic. He has chronic h/o orthostatic hypotension. Heidi Chaney looks volume depleted as well which might have contributed to his hypotension. -Afebrile,not septic and infection is not suspected 
-Bolus saline. Start low dose midodrine. Random cortisol normal 
-IV fluids 
-BP better today New onset atrial flutter/fibrillation and tachybrady syndrome,unable to tolerate rate control medications due to hypotension 
-S/P PPM  On 2/25 AMS/metabolic ebcephalopathy 
 
-According to the daughter the patient does not have a diagnosis of Dementia. Does not forget except for occasional forgetfulness of day 
-CT head unremarkable 
-Appreciate Neurology, probably from concussion, no work up needed. Now probably close to baseline Fall, questionable loss of consciousness. 
-The patient falls frequently at home, reportedly likely sec to Orthostatic hypotension 
-CT head 2/22 unremarkable 
-Xray lumbar spine 2/22 Possible new compression deformities of the L1 and L2 vertebral bodies 
-CT lumbar spine 2/22 Mildly displaced fracture of the anterior inferior corner of L1. A thin fracture extension extends to the superior endplate as well -Echo EF 46-50% with no RWMA. Aortic valve sclerosis -PT/OT 
-Appreciate neurosurgery, recommended brace when out of bed. Outpatient follow up with Dr Xiomara Gibbons Neurology Moderate protein caloric malnutrition 
-dietary consulted for management and recommendations. Uncontrolled hypertension -now hypotensive Cholelithiasis 
-non tender 
-Outpatient follow up Scalp laceration is minimal 
-wound care. Hypothyroidism 
-on synthroid -TSH elevated, synthroid increased 125 to 150 mcg. Recheck TSH in 4-6 weeks. History of prostate cancer Regular diet Code status: Partial CODE 
DVT prophylaxis: scd PTA: home Plan: Follow Cardiology Care Plan discussed with: Patient/Family Disposition: may need rehab Hospital Problems  Date Reviewed: 2/25/2019 Codes Class Noted POA Pacemaker ICD-10-CM: Z95.0 ICD-9-CM: V45.01  2/25/2019 Unknown Overview Signed 2/25/2019  1:06 PM by Ramon Arriola MD  
  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 
ICD-9-CM: 427.32  2/22/2019 Unknown Syncope ICD-10-CM: R55 
ICD-9-CM: 780.2  2/22/2019 Unknown Accelerated hypertension ICD-10-CM: I10 
ICD-9-CM: 401.0  2/22/2019 Unknown * (Principal) Fall ICD-10-CM: W19. Darbye Del ICD-9-CM: E888.9  2/22/2019 Unknown Review of Systems: A comprehensive review of systems was negative except for that written in the HPI. Vital Signs:  
 Last 24hrs VS reviewed since prior progress note. Most recent are: 
Visit Vitals /74 (BP 1 Location: Left arm, BP Patient Position: At rest) Pulse 90 Temp 98.7 °F (37.1 °C) Resp 18 Ht 6' 2\" (1.88 m) Wt 99.4 kg (219 lb 2.2 oz) SpO2 97% BMI 28.14 kg/m² Intake/Output Summary (Last 24 hours) at 2/27/2019 7624 Last data filed at 2/27/2019 7650 Gross per 24 hour Intake 840 ml Output  Net 840 ml Physical Examination:  
 
 
     
Constitutional:  Alert and awake.hard of hearing. ENT:  Oral mucous moist, oropharynx benign. Neck supple, Resp:  CTA bilaterally. No wheezing/rhonchi/rales. No accessory muscle use CV:  Regular rhythm, normal rate, no murmurs, gallops, rubs GI:  Soft, non distended, non tender. normoactive bowel sounds, no hepatosplenomegaly Musculoskeletal:  No edema, warm, 2+ pulses throughout Neurologic:  Moves all extremities. AAOx0, CN II-XII reviewed Skin:  Good turgor, no rashes or ulcers Data Review:  
 Review and/or order of clinical lab test 
 
 
Labs:  
 
Recent Labs  
  02/27/19 0448 02/26/19 
0518 WBC 5.8 7.6 HGB 10.8* 11.5* HCT 34.2* 35.8*  230 Recent Labs  
  02/27/19 0448   
K 3.5 * CO2 23 BUN 22* CREA 1.02  
GLU 88  
CA 9.7 Recent Labs  
  02/27/19 0448 SGOT 24 ALT 8*  
 TBILI 1.1* TP 7.4 ALB 2.6*  
GLOB 4.8* No results for input(s): INR, PTP, APTT in the last 72 hours. No lab exists for component: INREXT, INREXT No results for input(s): FE, TIBC, PSAT, FERR in the last 72 hours. Lab Results Component Value Date/Time Folate 18.6 02/22/2019 09:18 AM  
  
No results for input(s): PH, PCO2, PO2 in the last 72 hours. No results for input(s): CPK, CKNDX, TROIQ in the last 72 hours. No lab exists for component: CPKMB Lab Results Component Value Date/Time Cholesterol, total 143 03/22/2016 10:21 AM  
 HDL Cholesterol 53 03/22/2016 10:21 AM  
 LDL, calculated 78 03/22/2016 10:21 AM  
 Triglyceride 60 03/22/2016 10:21 AM  
 CHOL/HDL Ratio 2.7 06/23/2010 09:08 AM  
 
Lab Results Component Value Date/Time  Glucose (POC) 119 (H) 02/26/2019 04:34 PM  
 Glucose (POC) 105 (H) 02/23/2019 06:29 AM  
 Glucose (POC) 97 12/12/2012 10:06 AM  
 
Lab Results Component Value Date/Time Color DARK YELLOW 12/13/2018 01:18 PM  
 Appearance CLEAR 12/13/2018 01:18 PM  
 Specific gravity 1.021 12/13/2018 01:18 PM  
 pH (UA) 6.0 12/13/2018 01:18 PM  
 Protein 30 (A) 12/13/2018 01:18 PM  
 Glucose NEGATIVE  12/13/2018 01:18 PM  
 Ketone TRACE (A) 12/13/2018 01:18 PM  
 Bilirubin NEGATIVE  10/10/2018 09:54 AM  
 Urobilinogen >8.0 (H) 12/13/2018 01:18 PM  
 Nitrites NEGATIVE  12/13/2018 01:18 PM  
 Leukocyte Esterase NEGATIVE  12/13/2018 01:18 PM  
 Epithelial cells FEW 12/13/2018 01:18 PM  
 Bacteria NEGATIVE  12/13/2018 01:18 PM  
 WBC 0-4 12/13/2018 01:18 PM  
 RBC 0-5 12/13/2018 01:18 PM  
 
 
 
Medications Reviewed:  
 
Current Facility-Administered Medications Medication Dose Route Frequency  midodrine (PROAMITINE) tablet 2.5 mg  2.5 mg Oral TID WITH MEALS  lactated Ringers infusion  100 mL/hr IntraVENous CONTINUOUS  
 aspirin delayed-release tablet 81 mg  81 mg Oral DAILY  sodium chloride (NS) flush 5-40 mL  5-40 mL IntraVENous Q8H  
 sodium chloride (NS) flush 5-40 mL  5-40 mL IntraVENous PRN  
 cephALEXin (KEFLEX) capsule 250 mg  250 mg Oral TID  sodium chloride (NS) flush 5-40 mL  5-40 mL IntraVENous Q8H  
 sodium chloride (NS) flush 5-40 mL  5-40 mL IntraVENous PRN  
 acetaminophen (TYLENOL) tablet 650 mg  650 mg Oral Q4H PRN  
 HYDROcodone-acetaminophen (NORCO) 5-325 mg per tablet 1 Tab  1 Tab Oral Q4H PRN  
 morphine injection 1 mg  1 mg IntraVENous Q4H PRN  
 naloxone (NARCAN) injection 0.4 mg  0.4 mg IntraVENous PRN  
 ondansetron (ZOFRAN ODT) tablet 4 mg  4 mg Oral Q4H PRN  
 bisacodyl (DULCOLAX) tablet 5 mg  5 mg Oral DAILY PRN  pravastatin (PRAVACHOL) tablet 40 mg  40 mg Oral DAILY  tamsulosin (FLOMAX) capsule 0.4 mg  0.4 mg Oral DAILY  levothyroxine (SYNTHROID) tablet 150 mcg  150 mcg Oral ACB  hydrALAZINE (APRESOLINE) 20 mg/mL injection 10 mg  10 mg IntraVENous Q6H PRN  
 bacitracin 500 unit/gram ointment   Topical TID  
 
______________________________________________________________________ EXPECTED LENGTH OF STAY: 2d 4h 
ACTUAL LENGTH OF STAY:          5 Tonio Estrella MD

## 2019-02-28 NOTE — PROGRESS NOTES
Hospitalist Progress Note Lanie Mcmullen MD 
Answering service: [phone] OR 2017 from in house phone Date of Service:  2019 NAME:  Juan Watkins :  3/12/1931 MRN:  [id number] Admission Summary: An 25-year-old gentleman with a significant history of hypertension, hyperlipidemia, hyperparathyroidism, prostate cancer, and pituitary gland disease, presented to the emergency room after a fall. He fell after a dizzy spell on a concrete floor on his back. He bled from his posterior scalp. He is not sure about loss of consciousness. Most of the history was from his wife and daughter. The patient is hard of hearing. In the ED, he was evaluated with a CAT scan of the head that was negative for an acute intracranial abnormality. He also underwent CAT scan of the cervical spine that showed no acute abnormality. Lumbar spine CT showed mildly displaced fracture of the anterior inferior corner of L1 with a thin fracture extending into the superior endplate and multilevel spondylosis. While he was admitted to the medical floor and while he was on a remote telemonitor, it was detected the patient developed atrial fibrillation. Heart rate was in 120s briefly, but was down to up to 60s. He did not require any rate control medicine. A stat EKG was done that did not capture atrial fibrillation or atrial flutter and his blood pressure was high and he was moved to Intermediate Care Unit. Due to new-onset atrial fibrillation/flutter without any clear precipitating factor, would like to rule out pulmonary embolism and an acute pulmonary disease. A CT angiogram of the chest was obtained, which was negative for blood clots. The patient was moved from the medical to [facility] bed, as he is anticipated to require recurrent blood pressure check.   For most of the day, the patient's blood pressure initially was 166/75, subsequently stayed in the 180s, came down to 168 and had a one time dose of clonidine 2 p.m. and his blood pressure has now come down briefly was 85/62 but correct one was 141/84, so the most recent blood pressure at this time of dictation is 140/84; since his heart rate is slowing down, the Lopressor that was ordered was discontinued. I have consulted Cardiology, who has already seen the patient. He had undergone echocardiogram, which is not reported yet. The patient reassessed on the unit. He has history of cervical spine surgery by Spine Surgery and lower back by Orthopedic Surgery. Interval history / Subjective:  
  F/u fall Alert and awake,very alert. Up in chair. Daughter in the room. Assessment & Plan: Hypotension/orthostatic. He has chronic h/o orthostatic hypotension. -Afebrile,not septic and infection is not suspected 
-VOLODYMYR much improved with iv fluids and midodrine. New onset atrial flutter/fibrillation and tachybrady syndrome,unable to tolerate rate control medications due to hypotension 
-S/P PPM  On 2/25 AMS 
-?acute hospital delirium 
-According to the daughter the patient does not have a diagnosis of Dementia. Does not forget except for occasional forgetfulness of day 
-CT head unremarkable 
-Appreciate Neurology, probably from concussion, no work up needed. Now probably close to baseline 
-I think he needs out patient neurocognitive testing once he recovers from this acute event. Fall, questionable loss of consciousness. 
-The patient falls frequently at home, reportedly likely sec to Orthostatic hypotension 
-CT head 2/22 unremarkable 
-Xray lumbar spine 2/22 Possible new compression deformities of the L1 and L2 vertebral bodies 
-CT lumbar spine 2/22 Mildly displaced fracture of the anterior inferior corner of L1. A thin fracture extension extends to the superior endplate as well 
-Echo EF 46-50% with no RWMA. Aortic valve sclerosis -PT/OT -Appreciate neurosurgery, recommended brace when out of bed. Outpatient follow up with Dr Mahi Bailey Neurology Accelerated Hypertension on admission with ensuing hypotension subsequently. 
-Now antihypertensive as he is hypotensive requiring vasopressor and iv fluids. Cholelithiasis 
-non tender 
-Outpatient follow up Scalp laceration is minimal 
-wound care. Hypothyroidism 
-on synthroid -TSH elevated, synthroid increased 125 to 150 mcg. Recheck TSH in 4-6 weeks. History of prostate cancer Regular diet Code status: Partial CODE 
DVT prophylaxis: Lovenox,scd PTA: home Plan: Follow Cardiology Care Plan discussed with: Patient/Family Disposition: may need rehab Hospital Problems  Date Reviewed: 2/25/2019 Codes Class Noted POA Pacemaker ICD-10-CM: Z95.0 ICD-9-CM: V45.01  2/25/2019 Unknown Overview Signed 2/25/2019  1:06 PM by Tiana Boyer MD  
  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 
ICD-9-CM: 427.32  2/22/2019 Unknown Syncope ICD-10-CM: R55 
ICD-9-CM: 780.2  2/22/2019 Unknown Accelerated hypertension ICD-10-CM: I10 
ICD-9-CM: 401.0  2/22/2019 Unknown * (Principal) Fall ICD-10-CM: W19. Markell White ICD-9-CM: E888.9  2/22/2019 Unknown Review of Systems: A comprehensive review of systems was negative except for that written in the HPI. Vital Signs:  
 Last 24hrs VS reviewed since prior progress note. Most recent are: 
Visit Vitals /70 (BP 1 Location: Left arm, BP Patient Position: Sitting) Pulse 80 Temp 98.2 °F (36.8 °C) Resp 18 Ht 6' 2\" (1.88 m) Wt 98.3 kg (216 lb 11.4 oz) SpO2 96% BMI 27.82 kg/m² Intake/Output Summary (Last 24 hours) at 2/28/2019 2081 Last data filed at 2/28/2019 7945 Gross per 24 hour Intake  Output 300 ml Net -300 ml Physical Examination:  
 
 
     
Constitutional:  Alert and awake.hard of hearing. ENT:  Oral mucous moist, oropharynx benign. Neck supple, Resp:  CTA bilaterally. No wheezing/rhonchi/rales. No accessory muscle use CV:  Regular rhythm, normal rate, no murmurs, gallops, rubs GI:  Soft, non distended, non tender. normoactive bowel sounds, no hepatosplenomegaly Musculoskeletal:  No edema, warm, 2+ pulses throughout Neurologic:  Moves all extremities. AAOx3, CN II-XII reviewed Skin:  Good turgor, no rashes or ulcers Data Review:  
 Review and/or order of clinical lab test 
 
 
Labs:  
 
Recent Labs  
  02/27/19 0448 02/26/19 0518 WBC 5.8 7.6 HGB 10.8* 11.5* HCT 34.2* 35.8*  230 Recent Labs  
  02/27/19 0448   
K 3.5 * CO2 23 BUN 22* CREA 1.02  
GLU 88  
CA 9.7 Recent Labs  
  02/27/19 0448 SGOT 24 ALT 8*  
 TBILI 1.1* TP 7.4 ALB 2.6*  
GLOB 4.8* No results for input(s): INR, PTP, APTT in the last 72 hours. No lab exists for component: INREXT, INREXT No results for input(s): FE, TIBC, PSAT, FERR in the last 72 hours. Lab Results Component Value Date/Time Folate 18.6 02/22/2019 09:18 AM  
  
No results for input(s): PH, PCO2, PO2 in the last 72 hours. No results for input(s): CPK, CKNDX, TROIQ in the last 72 hours. No lab exists for component: CPKMB Lab Results Component Value Date/Time Cholesterol, total 143 03/22/2016 10:21 AM  
 HDL Cholesterol 53 03/22/2016 10:21 AM  
 LDL, calculated 78 03/22/2016 10:21 AM  
 Triglyceride 60 03/22/2016 10:21 AM  
 CHOL/HDL Ratio 2.7 06/23/2010 09:08 AM  
 
Lab Results Component Value Date/Time Glucose (POC) 119 (H) 02/26/2019 04:34 PM  
 Glucose (POC) 105 (H) 02/23/2019 06:29 AM  
 Glucose (POC) 97 12/12/2012 10:06 AM  
 
Lab Results Component Value Date/Time Color DARK YELLOW 12/13/2018 01:18 PM  
 Appearance CLEAR 12/13/2018 01:18 PM  
 Specific gravity 1.021 12/13/2018 01:18 PM  
 pH (UA) 6.0 12/13/2018 01:18 PM  
 Protein 30 (A) 12/13/2018 01:18 PM  
 Glucose NEGATIVE  12/13/2018 01:18 PM  
 Ketone TRACE (A) 12/13/2018 01:18 PM  
 Bilirubin NEGATIVE  10/10/2018 09:54 AM  
 Urobilinogen >8.0 (H) 12/13/2018 01:18 PM  
 Nitrites NEGATIVE  12/13/2018 01:18 PM  
 Leukocyte Esterase NEGATIVE  12/13/2018 01:18 PM  
 Epithelial cells FEW 12/13/2018 01:18 PM  
 Bacteria NEGATIVE  12/13/2018 01:18 PM  
 WBC 0-4 12/13/2018 01:18 PM  
 RBC 0-5 12/13/2018 01:18 PM  
 
 
 
Medications Reviewed:  
 
Current Facility-Administered Medications Medication Dose Route Frequency  midodrine (PROAMITINE) tablet 2.5 mg  2.5 mg Oral TID WITH MEALS  lactated Ringers infusion  100 mL/hr IntraVENous CONTINUOUS  
 aspirin delayed-release tablet 81 mg  81 mg Oral DAILY  sodium chloride (NS) flush 5-40 mL  5-40 mL IntraVENous Q8H  
 sodium chloride (NS) flush 5-40 mL  5-40 mL IntraVENous PRN  
 cephALEXin (KEFLEX) capsule 250 mg  250 mg Oral TID  sodium chloride (NS) flush 5-40 mL  5-40 mL IntraVENous Q8H  
 sodium chloride (NS) flush 5-40 mL  5-40 mL IntraVENous PRN  
 acetaminophen (TYLENOL) tablet 650 mg  650 mg Oral Q4H PRN  
 HYDROcodone-acetaminophen (NORCO) 5-325 mg per tablet 1 Tab  1 Tab Oral Q4H PRN  
 morphine injection 1 mg  1 mg IntraVENous Q4H PRN  
 naloxone (NARCAN) injection 0.4 mg  0.4 mg IntraVENous PRN  
 ondansetron (ZOFRAN ODT) tablet 4 mg  4 mg Oral Q4H PRN  
 bisacodyl (DULCOLAX) tablet 5 mg  5 mg Oral DAILY PRN  pravastatin (PRAVACHOL) tablet 40 mg  40 mg Oral DAILY  tamsulosin (FLOMAX) capsule 0.4 mg  0.4 mg Oral DAILY  levothyroxine (SYNTHROID) tablet 150 mcg  150 mcg Oral ACB  hydrALAZINE (APRESOLINE) 20 mg/mL injection 10 mg  10 mg IntraVENous Q6H PRN  
 bacitracin 500 unit/gram ointment   Topical TID ______________________________________________________________________ EXPECTED LENGTH OF STAY: 2d 4h 
ACTUAL LENGTH OF STAY:          6 Linette Palafxo MD

## 2019-02-28 NOTE — PROGRESS NOTES
The  Mottled area on both thighs was present before admission,, per his niece who is a CNA that cares for him.

## 2019-03-01 NOTE — PROGRESS NOTES
TRANSFER - OUT REPORT: 
 
Verbal report given to [place](name) on Juan   being transferred to [facility]) for routine progression of care Report consisted of patients Situation, Background, Assessment and  
Recommendations(SBAR). Information from the following report(s) SBAR and MAR was reviewed with the receiving nurse. Lines:  
none Opportunity for questions and clarification was provided. Patient transported with: 
Ambulance 1622: I have reviewed discharge instructions with the patient and daughter. The patient and Daughter verbalized understanding. IV and telemetry removed's . Patient  Belongings transported

## 2019-03-01 NOTE — WOUND CARE
WOCN Note:  
  
Follow up assessment. 
  
Chart reviewed. Admitted DX:  Back pain [M54.9] and fall prior to admission. 
  
Assessment:  
Patient is alert, communicative and requires assistance 1 for repositioning. Bed: total care Patient wearing briefs for incontinence. Patient reports no pain. Sacrum, heels and buttocks intact.   
  
1. Sacrum and buttocks have purple bruising from fall. 2.  Posterior head laceration, s/p fall within a area of ~ 2 x 2 x 0.1 cm; dry scabbing. Recommendations: 1. Head laceration:  3 times per day apply Bacitracin. 
  
Skin Care & Pressure Prevention: 
Minimize layers of linen/pads under patient to optimize support surface. Turn/reposition approximately every 2 hours and offload heels. Manage incontinence / promote continence 
  
Discussed above plan with RN. 
  
Transition of Care: Plan to follow as needed while admitted to hospital. 
  
CYNDI Galvez RN [facility], [facility]. Wound Care Office [phone] Pager [phone]

## 2019-03-01 NOTE — DISCHARGE INSTRUCTIONS
PATIENT INSTRUCTIONS POST-PACEMAKER IMPLANT    1. No heavy lifting or strenuous exercise x 1 week. 2.  Showers are ok, but no tub baths or soaking groin site x 5-7 days. 3.  Do not drive for 3 days    4. Call Dr. Cody Min at [phone] if you experience any of the following symptoms:  1. Redness at the groin site  2. Swelling at or around the groin   3. Pain around the groin  4. Dizziness, lightheadedness, fainting spells  5. Lack of energy  6. Shortness of breath  7. Rapid heart rate  8. Chest or muscle twitches    5. Follow-up with Dr. Nini Silvestre office as noted below. Future Appointments   Date Time Provider Quiana Wray   6/13/2019  9:00 AM Hola Lisa, [address]   6/13/2019  9:20 AM Susan Lau  [address]     6. You may use over the counter pain medication (acetaminophen) and ice pack for pain relief as needed. Ramo Iqbal M.D. [facility]  Electrophysiology/Cardiology  [facility] [place]  [address], [place] [address], [address]  [phone]                 Patient Instructions Post-EP study and ablation    1. No heavy lifting or exercises for 1 week. This includes the following:  Do not push or move furniture, jog or run    2. Do not drive for 3 days. 3.  Call Dr. Cody Min at [phone] if you experience any of the following symptoms:  Dizziness, lightheadedness, fainting spells  Lack of energy  Shortness of breath  Rapid heart rate  Chest or muscle twitches  Blurry vision, double vision, weakness, numbness  Nausea, vomiting  Fever  Bleeding in the stool, black stool  Leg swelling, pain    4. Follow-up with Dr. Nini Silvestre clinic as noted below.     Future Appointments   Date Time Provider Quiana Wray   6/13/2019  9:00 AM PACEMAKER3, [address]   6/13/2019  9:20 AM Kira Amador  [address]        Discharge SNF/Rehab Instructions/LTAC       PATIENT ID: Leesa Whitaker  MRN: [id number]   YOB: 1931    DATE OF ADMISSION: 2/22/2019  9:03 AM    DATE OF DISCHARGE: 3/1/2019    PRIMARY CARE PROVIDER: Efra Antonio MD       ATTENDING PHYSICIAN: Radha Crook MD  DISCHARGING PROVIDER: Marisabel De Los Santos MD     To contact this individual call [phone] and ask the  to page. If unavailable ask to be transferred the Adult Hospitalist Department. CONSULTATIONS: IP CONSULT TO HOSPITALIST  IP CONSULT TO CARDIOLOGY  IP CONSULT TO NEUROSURGERY  IP CONSULT TO NEUROLOGY    PROCEDURES/SURGERIES: Procedure(s):  INSERT OR REPLACE TRANSCATH PPM LEADLESS  ABLATION [address] COURSE:   From admission H&P   \"An 80-year-old gentleman with a significant history of hypertension, hyperlipidemia, hyperparathyroidism, prostate cancer, and pituitary gland disease, presented to the emergency room after a fall. He fell after a dizzy spell on a concrete floor on his back. He bled from his posterior scalp,not sure about loss of consciousness. CAT scan of the head that was negative for an acute intracranial abnormality. He also underwent CAT scan of the cervical spine that showed no acute abnormality. Lumbar spine CT showed mildly displaced fracture of the anterior inferior corner of L1 with a thin fracture extending into the superior endplate and multilevel spondylosis. While he was admitted to the medical floor and while he was on a remote telemonitor,  atrial fibrillation was detected. Due to new-onset atrial fibrillation/flutter without any clear precipitating factor,a CT angiogram of the chest was obtained, which was negative for blood clots. \"     Hypotension/orthostatic. He has chronic h/o orthostatic hypotension. Resolved. He had accelerated Hypertension on admission.  -No antihypertensive. He had required fluids and vasopressor. -Afebrile,not septic and infection is not suspected. He received iv fluids,started on midodrine,all antihypertensives discontinued. His BP stayed stable for 2 days,Iv fluid stopped and discharged on midodrine 5 mg TID. Blood pressure has to be monitored closely for hypotension and rebound hypertension. If BP is persistently >140/90,may need to decrease dose/wean off the midodrine. New onset atrial flutter/fibrillation and tachybrady syndrome,unable to tolerate rate control medications due to hypotension  -Underwent PPM  On 2/25. Not good candidate for anticoagulation due to falls. AMS:acute hospital delirium. Improved  -According to the daughter the patient does not have a diagnosis of Dementia. Does not forget except for occasional forgetfulness of day  -CT head unremarkable  -Appreciate Neurology, probably from concussion, no work up needed. Now probably close to baseline  -We recommended(talked with family)out patient neurocognitive testing once he recovers from this acute event. Fall, questionable loss of consciousness. This was the reason for presentation to the ED and admission. .  -The patient falls frequently at home, reportedly likely sec to Orthostatic hypotension  -CT head 2/22 unremarkable  -Xray lumbar spine 2/22 Possible new compression deformities of the L1 and L2 vertebral bodies  -CT lumbar spine 2/22 Mildly displaced fracture of the anterior inferior corner of L1. A thin fracture extension extends to the superior endplate as well  -Echo EF 46-50% with no RWMA. Aortic valve sclerosis  -Appreciate neurosurgery, recommended brace when out of bed. Outpatient follow up with Dr Esther Ellington                Cholelithiasis  -non tender  -Outpatient follow up     Scalp laceration is minimal.healed       Hypothyroidism  -on synthroid   -TSH elevated, synthroid increased 125 to 150 mcg. Recheck TSH in 4-6 weeks.      History of prostate cancer       Diet: cardiac,regular        PENDING TEST RESULTS:   At the time of discharge the following test results are still pending: none    FOLLOW UP APPOINTMENTS:    Follow-up Information     Follow up With Specialties Details Why Contact Info    Cristal Rain MD Neurosurgery Schedule an appointment as soon as possible for a visit in 1 month for lumbar x-rays and f/u of L1 fracture [address] [facility] [address] [facility]      Lin Frazier MD Cardiology   [address] [facility] [address]  [address]  [phone]             ADDITIONAL CARE RECOMMENDATIONS:     DIET: Regular Diet and Cardiac Diet      OXYGEN / BiPAP SETTINGS: on room air    ACTIVITY: Activity as tolerated and PT/OT Eval and Treat    WOUND CARE: NA    EQUIPMENT needed: TBD      DISCHARGE MEDICATIONS:   See Medication Reconciliation Form      NOTIFY YOUR PHYSICIAN FOR ANY OF THE FOLLOWING:   Fever over 101 degrees for 24 hours. Chest pain, shortness of breath, fever, chills, nausea, vomiting, diarrhea, change in mentation, falling, weakness, bleeding. Severe pain or pain not relieved by medications. Or, any other signs or symptoms that you may have questions about. DISPOSITION:    Home With:   OT  PT  HH  RN      x SNF/Inpatient Rehab/LTAC    Independent/assisted living    Hospice    Other:       PATIENT CONDITION AT DISCHARGE:     Functional status    Poor    x Deconditioned     Independent      Cognition     Lucid    x Forgetful     Dementia      Catheters/lines (plus indication)    [place]     PICC     PEG    x None      Code status     Full code     Partial,no intubation. PHYSICAL EXAMINATION AT DISCHARGE:                                          Constitutional:  Alert and awake.hard of hearing. ENT:  Oral mucous moist, oropharynx benign. Neck supple,    Resp:  CTA bilaterally. No wheezing/rhonchi/rales. No accessory muscle use   CV:  Regular rhythm, normal rate, no murmurs, gallops, rubs    GI:  Soft, non distended, non tender.  normoactive bowel sounds, no hepatosplenomegaly     Musculoskeletal:  No edema, warm, 2+ pulses throughout    Neurologic:  Moves all extremities. 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 Adrianna   ICD-9-CM: E888.9  2/22/2019 - Present        Left ventricular hypertrophy due to hypertensive disease ICD-10-CM: I11.9  ICD-9-CM: 402.90  9/23/2015 - Present        Memory deficit ICD-10-CM: R41.3  ICD-9-CM: 780.93  12/21/2014 - Present    Overview Signed 12/21/2014  5:36 PM by Caroline Dodd MD     Needs MMSE when seen next. Hyperparathyroidism, primary ([address] ICD-10-CM: E21.0  ICD-9-CM: 252.01  6/19/2014 - Present        Chronic kidney disease (CKD), stage III (moderate) (HCC) ICD-10-CM: N18.3  ICD-9-CM: 585.3  6/19/2014 - Present        Bradycardia ICD-10-CM: R00.1  ICD-9-CM: 427.89  6/19/2013 - Present        PVC's (premature ventricular contractions) ICD-10-CM: I49.3  ICD-9-CM: 427.69  6/19/2013 - Present        Nonunion of fracture ICD-10-CM: RAD2661  ICD-9-CM: 733.82  11/29/2012 - Present        Drug-induced osteoporosis ICD-10-CM: M81.8, T50.905A  ICD-9-CM: 733.09, E947.9  10/16/2012 - Present    Overview Signed 10/16/2012  2:12 PM by Nessa Andres.      Hip fracture 2012, on hormonal therapy for prostate CA  BMD 10/12  T for spine is -0.7,  Left forearm  -0.8  Let fem neck  -1.6             Traumatic osteoarthritis of left ankle ICD-10-CM: M19.172  ICD-9-CM: 715.27  10/4/2012 - Present    Overview Signed 10/4/2012 11:10 AM by Sam Carias. Severe aggravation of long standing post-traumatic pain and arthritis, sudden exacerbation 9/13 with persistent inability to bear any weight. Evaluated by Dr. Donald Hobbs             Post-surgical hypothyroidism ICD-10-CM: E89.0  ICD-9-CM: 244.0  4/5/2012 - Present    Overview Addendum 3/17/2013  8:38 AM by Sam Carias. Graves disease. Dose varies between 150 and 175, given problems with bone disease, will err on the side of higher TSH and use 150. Prostate cancer [facility]) ICD-10-CM: C61  ICD-9-CM: 185  9/13/2010 - Present    Overview Addendum 1/9/2012  2:12 PM by Sam Carias. Prostate biopsy 9/10 , cancer in 1/12 biopsy, annmarie score 3+3=6  Repeat biopsy 4/11 cancer in 7/14 specimens   Annmarie 5+3 =8, progression of disease over last year . 5/11 Rx with hormonal therapy and radiation  PSA of 0 9/11             Low back pain ICD-10-CM: M54.5  ICD-9-CM: 724.2  4/1/2010 - Present        Obstructive sleep apnea ICD-10-CM: G47.33  ICD-9-CM: 327.23  3/22/2010 - Present    Overview Signed 11/30/2010 10:19 AM by Terri Gentile     On CPAP regularly             Colon polyp ICD-10-CM: K63.5  ICD-9-CM: 211.3  3/22/2010 - Present        Primary hypercholesterolemia ICD-10-CM: E78.00  ICD-9-CM: 272.0  3/22/2010 - Present        Benign hypertension with chronic kidney disease, stage III ([facility] [address] ICD-10-CM: I12.9, N18.3  ICD-9-CM: 403.10, 585.3  3/22/2010 - Present        Chorioretinitis ICD-10-CM: H30.90  ICD-9-CM: 363.20  3/22/2010 - Present    Overview Signed 3/22/2010  9:48 AM by Sam Carias.      Left eye macula             DJD (degenerative joint disease) of lumbar spine ICD-10-CM: M46.760  ICD-9-CM: 721.3  3/22/2010 - Present                      Signed:    Shilpi Lares MD  3/1/2019  2:27 PM

## 2019-03-01 NOTE — PROGRESS NOTES
Bedside and Verbal shift change report given to Ela Perez (oncoming nurse) by Belinda Simeon (offgoing nurse). Report included the following information SBAR, Kardex, Procedure Summary, Intake/Output, MAR, Accordion, Recent Results, Med Rec Status and Cardiac Rhythm Paced. Problem: Falls - Risk of 
Goal: *Absence of Falls Document Karyn Rendon Fall Risk and appropriate interventions in the flowsheet. Outcome: Progressing Towards Goal 
Fall Risk Interventions: 
Mobility Interventions: OT consult for ADLs, Patient to call before getting OOB, PT Consult for mobility concerns, PT Consult for assist device competence, Utilize walker, cane, or other assistive device, Communicate number of staff needed for ambulation/transfer, Bed/chair exit alarm Mentation Interventions: Adequate sleep, hydration, pain control, Bed/chair exit alarm, Door open when patient unattended, Eyeglasses and hearing aids, More frequent rounding, Increase mobility, Room close to nurse's station, Toileting rounds, Update white board Medication Interventions: Bed/chair exit alarm, Evaluate medications/consider consulting pharmacy, Patient to call before getting OOB, Teach patient to arise slowly Elimination Interventions: Bed/chair exit alarm, Call light in reach, Urinal in reach, Patient to call for help with toileting needs History of Falls Interventions: Bed/chair exit alarm Problem: Patient Education: Go to Patient Education Activity Goal: Patient/Family Education Outcome: Progressing Towards Goal 
Working with PT and OT. Plan to Discharge to [phone]

## 2019-03-01 NOTE — PROGRESS NOTES
Bedside and Verbal shift change report [facility] (oncoming nurse) by Marleni Overton (offgoing nurse). Report included the following information SBAR, Procedure Summary, Intake/Output, MAR, Med Rec Status and Cardiac Rhythm paced.

## 2019-03-01 NOTE — PROGRESS NOTES
Patient has a bed at [facility]. [facility] setup for 1530, patient and family aware. Folder will be placed on chart, and report can be called to [phone]. JULIO CESAR. Teena Foster, [facility]

## 2019-03-01 NOTE — PROGRESS NOTES
Problem: Pressure Injury - Risk of 
Goal: *Prevention of pressure injury Document Jayro Scale and appropriate interventions in the flowsheet. Outcome: Resolved/Met Date Met: 03/01/19 Pressure Injury Interventions: 
Sensory Interventions: Assess changes in LOC Moisture Interventions: Offer toileting Q_hr Activity Interventions: Increase time out of bed, Pressure redistribution bed/mattress(bed type) Mobility Interventions: Turn and reposition approx. every two hours(pillow and wedges) Nutrition Interventions: Document food/fluid/supplement intake Friction and Shear Interventions: Apply protective barrier, creams and emollients

## 2019-03-01 NOTE — PROGRESS NOTES
Hospitalist Progress Note Esperanza Flores MD 
Answering service: [phone] or [phone] from in house phone Date of Service:  3/1/2019 NAME:  Jose Manuel Oconnor :  3/12/1931 MRN:  [id number] Admission Summary: An 66-year-old gentleman with a significant history of hypertension, hyperlipidemia, hyperparathyroidism, prostate cancer, and pituitary gland disease, presented to the emergency room after a fall. He fell after a dizzy spell on a concrete floor on his back. He bled from his posterior scalp. He is not sure about loss of consciousness. Most of the history was from his wife and daughter. The patient is hard of hearing. In the ED, he was evaluated with a CAT scan of the head that was negative for an acute intracranial abnormality. He also underwent CAT scan of the cervical spine that showed no acute abnormality. Lumbar spine CT showed mildly displaced fracture of the anterior inferior corner of L1 with a thin fracture extending into the superior endplate and multilevel spondylosis. While he was admitted to the medical floor and while he was on a remote telemonitor, it was detected the patient developed atrial fibrillation. Heart rate was in 120s briefly, but was down to up to 60s. He did not require any rate control medicine. A stat EKG was done that did not capture atrial fibrillation or atrial flutter and his blood pressure was high and he was moved to Intermediate Care Unit. Due to new-onset atrial fibrillation/flutter without any clear precipitating factor, would like to rule out pulmonary embolism and an acute pulmonary disease. A CT angiogram of the chest was obtained, which was negative for blood clots. The patient was moved from the medical to [facility] bed, as he is anticipated to require recurrent blood pressure check.   For most of the day, the patient's blood pressure initially was 166/75, subsequently stayed in the 180s, came down to 168 and had a one time dose of clonidine 2 p.m. and his blood pressure has now come down briefly was 85/62 but correct one was 141/84, so the most recent blood pressure at this time of dictation is 140/84; since his heart rate is slowing down, the Lopressor that was ordered was discontinued. I have consulted Cardiology, who has already seen the patient. He had undergone echocardiogram, which is not reported yet. The patient reassessed on the unit. He has history of cervical spine surgery by Spine Surgery and lower back by Orthopedic Surgery. Interval history / Subjective:  
  F/u fall Alert and awake,lying in bed. Denied any complaints. Family not in the room Assessment & Plan: Hypotension/orthostatic. He has chronic h/o orthostatic hypotension. Resolved. -Afebrile,not septic and infection is not suspected. -D/c iv fluids. Continue midodrine. New onset atrial flutter/fibrillation and tachybrady syndrome,unable to tolerate rate control medications due to hypotension 
-S/P PPM  On 2/25 AMS:acute hospital delirium 
-According to the daughter the patient does not have a diagnosis of Dementia. Does not forget except for occasional forgetfulness of day 
-CT head unremarkable 
-Appreciate Neurology, probably from concussion, no work up needed. Now probably close to baseline 
-I think he needs out patient neurocognitive testing once he recovers from this acute event. Fall, questionable loss of consciousness. 
-The patient falls frequently at home, reportedly likely sec to Orthostatic hypotension 
-CT head 2/22 unremarkable 
-Xray lumbar spine 2/22 Possible new compression deformities of the L1 and L2 vertebral bodies 
-CT lumbar spine 2/22 Mildly displaced fracture of the anterior inferior corner of L1. A thin fracture extension extends to the superior endplate as well 
-Echo EF 46-50% with no RWMA. Aortic valve sclerosis -Appreciate neurosurgery, recommended brace when out of bed. Outpatient follow up with Dr Negar Munoz Neurology Accelerated Hypertension on admission with ensuing hypotension subsequently. 
-No antihypertensive. He had required fluids and vasopressor. Cholelithiasis 
-non tender 
-Outpatient follow up Scalp laceration is minimal 
-wound care. Hypothyroidism 
-on synthroid -TSH elevated, synthroid increased 125 to 150 mcg. Recheck TSH in 4-6 weeks. History of prostate cancer Regular diet Code status: Partial CODE 
DVT prophylaxis: Lovenox,scd PTA: home Plan: Follow Cardiology Care Plan discussed with: Patient/Family Disposition: can discharge to rehab when even accepted. -Discussed with CM,awaiting bed from [address] [place] [address]. Hospital Problems  Date Reviewed: 2/25/2019 Codes Class Noted POA Pacemaker ICD-10-CM: Z95.0 ICD-9-CM: V45.01  2/25/2019 Unknown Overview Signed 2/25/2019  1:06 PM by Aixa Mittal MD  
  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 
ICD-9-CM: 427.32  2/22/2019 Unknown Syncope ICD-10-CM: R55 
ICD-9-CM: 780.2  2/22/2019 Unknown Accelerated hypertension ICD-10-CM: I10 
ICD-9-CM: 401.0  2/22/2019 Unknown * (Principal) Fall ICD-10-CM: W19. Lawrence Mention ICD-9-CM: E888.9  2/22/2019 Unknown Review of Systems: A comprehensive review of systems was negative except for that written in the HPI. Vital Signs:  
 Last 24hrs VS reviewed since prior progress note. Most recent are: 
Visit Vitals BP (!) 143/94 (BP 1 Location: Left arm, BP Patient Position: At rest) Pulse 90 Temp 98.1 °F (36.7 °C) Resp 16 Ht 6' 2\" (1.88 m) Wt 100.1 kg (220 lb 10.9 oz) SpO2 99% BMI 28.33 kg/m² Intake/Output Summary (Last 24 hours) at 3/1/2019 9157 Last data filed at 3/1/2019 4882 Gross per 24 hour Intake 1766.33 ml Output 1050 ml Net 716.33 ml Physical Examination:  
 
 
     
Constitutional:  Alert and awake.hard of hearing. ENT:  Oral mucous moist, oropharynx benign. Neck supple, Resp:  CTA bilaterally. No wheezing/rhonchi/rales. No accessory muscle use CV:  Regular rhythm, normal rate, no murmurs, gallops, rubs GI:  Soft, non distended, non tender. normoactive bowel sounds, no hepatosplenomegaly Musculoskeletal:  No edema, warm, 2+ pulses throughout Neurologic:  Moves all extremities. AAOx3, CN II-XII reviewed Skin:  Good turgor, no rashes or ulcers Data Review:  
 Review and/or order of clinical lab test 
 
 
Labs:  
 
Recent Labs  
  02/27/19 0448 WBC 5.8 HGB 10.8* HCT 34.2*  
 Recent Labs  
  02/27/19 0448   
K 3.5 * CO2 23 BUN 22* CREA 1.02  
GLU 88  
CA 9.7 Recent Labs  
  02/27/19 0448 SGOT 24 ALT 8*  
 TBILI 1.1* TP 7.4 ALB 2.6*  
GLOB 4.8* No results for input(s): INR, PTP, APTT in the last 72 hours. No lab exists for component: INREXT, INREXT No results for input(s): FE, TIBC, PSAT, FERR in the last 72 hours. Lab Results Component Value Date/Time Folate 18.6 02/22/2019 09:18 AM  
  
No results for input(s): PH, PCO2, PO2 in the last 72 hours. No results for input(s): CPK, CKNDX, TROIQ in the last 72 hours. No lab exists for component: CPKMB Lab Results Component Value Date/Time Cholesterol, total 143 03/22/2016 10:21 AM  
 HDL Cholesterol 53 03/22/2016 10:21 AM  
 LDL, calculated 78 03/22/2016 10:21 AM  
 Triglyceride 60 03/22/2016 10:21 AM  
 CHOL/HDL Ratio 2.7 06/23/2010 09:08 AM  
 
Lab Results Component Value Date/Time  Glucose (POC) 119 (H) 02/26/2019 04:34 PM  
 Glucose (POC) 105 (H) 02/23/2019 06:29 AM  
 Glucose (POC) 97 12/12/2012 10:06 AM  
 
Lab Results Component Value Date/Time Color DARK YELLOW 12/13/2018 01:18 PM  
 Appearance CLEAR 12/13/2018 01:18 PM  
 Specific gravity 1.021 12/13/2018 01:18 PM  
 pH (UA) 6.0 12/13/2018 01:18 PM  
 Protein 30 (A) 12/13/2018 01:18 PM  
 Glucose NEGATIVE  12/13/2018 01:18 PM  
 Ketone TRACE (A) 12/13/2018 01:18 PM  
 Bilirubin NEGATIVE  10/10/2018 09:54 AM  
 Urobilinogen >8.0 (H) 12/13/2018 01:18 PM  
 Nitrites NEGATIVE  12/13/2018 01:18 PM  
 Leukocyte Esterase NEGATIVE  12/13/2018 01:18 PM  
 Epithelial cells FEW 12/13/2018 01:18 PM  
 Bacteria NEGATIVE  12/13/2018 01:18 PM  
 WBC 0-4 12/13/2018 01:18 PM  
 RBC 0-5 12/13/2018 01:18 PM  
 
 
 
Medications Reviewed:  
 
Current Facility-Administered Medications Medication Dose Route Frequency  enoxaparin (LOVENOX) injection 40 mg  40 mg SubCUTAneous Q24H  
 midodrine (PROAMITINE) tablet 2.5 mg  2.5 mg Oral TID WITH MEALS  
 aspirin delayed-release tablet 81 mg  81 mg Oral DAILY  sodium chloride (NS) flush 5-40 mL  5-40 mL IntraVENous Q8H  
 sodium chloride (NS) flush 5-40 mL  5-40 mL IntraVENous PRN  
 cephALEXin (KEFLEX) capsule 250 mg  250 mg Oral TID  sodium chloride (NS) flush 5-40 mL  5-40 mL IntraVENous Q8H  
 sodium chloride (NS) flush 5-40 mL  5-40 mL IntraVENous PRN  
 acetaminophen (TYLENOL) tablet 650 mg  650 mg Oral Q4H PRN  
 HYDROcodone-acetaminophen (NORCO) 5-325 mg per tablet 1 Tab  1 Tab Oral Q4H PRN  
 morphine injection 1 mg  1 mg IntraVENous Q4H PRN  
 naloxone (NARCAN) injection 0.4 mg  0.4 mg IntraVENous PRN  
 ondansetron (ZOFRAN ODT) tablet 4 mg  4 mg Oral Q4H PRN  
 bisacodyl (DULCOLAX) tablet 5 mg  5 mg Oral DAILY PRN  pravastatin (PRAVACHOL) tablet 40 mg  40 mg Oral DAILY  tamsulosin (FLOMAX) capsule 0.4 mg  0.4 mg Oral DAILY  levothyroxine (SYNTHROID) tablet 150 mcg  150 mcg Oral ACB  hydrALAZINE (APRESOLINE) 20 mg/mL injection 10 mg  10 mg IntraVENous Q6H PRN  
 bacitracin 500 unit/gram ointment   Topical TID  
 
______________________________________________________________________ EXPECTED LENGTH OF STAY: 2d 4h 
ACTUAL LENGTH OF STAY:          [address] Marquez MD

## 2019-03-01 NOTE — PROGRESS NOTES
[facility] can accept patient when they have a bed. Patient and family are aware.  
 
Mario Wallace, [facility]

## 2019-03-01 NOTE — PROGRESS NOTES
Problem: Falls - Risk of 
Goal: *Absence of Falls Document Charlemont Gelacio Fall Risk and appropriate interventions in the flowsheet. Outcome: Progressing Towards Goal 
Fall Risk Interventions: 
Mobility Interventions: Bed/chair exit alarm Mentation Interventions: Bed/chair exit alarm Medication Interventions: Bed/chair exit alarm, Patient to call before getting OOB, Teach patient to arise slowly Elimination Interventions: Bed/chair exit alarm History of Falls Interventions: Bed/chair exit alarm Problem: Pressure Injury - Risk of 
Goal: *Prevention of pressure injury Document Jayro Scale and appropriate interventions in the flowsheet. Outcome: Progressing Towards Goal 
Pressure Injury Interventions: 
Sensory Interventions: Turn and reposition approx. every two hours (pillows and wedges if needed), Minimize linen layers, Keep linens dry and wrinkle-free Moisture Interventions: Check for incontinence Q2 hours and as needed, Offer toileting Q_hr Activity Interventions: Increase time out of bed, Pressure redistribution bed/mattress(bed type) Mobility Interventions: Turn and reposition approx. every two hours(pillow and wedges), Pressure redistribution bed/mattress (bed type) Nutrition Interventions: Document food/fluid/supplement intake Friction and Shear Interventions: Apply protective barrier, creams and emollients

## 2019-05-14 NOTE — DISCHARGE INSTRUCTIONS
Patient Education     Closed Head Injury: After Your Visit  Your Care Instructions  You have had a head injury. Often, people cannot remember what happened right before or right after a head injury. Some head injuries can make you pass out, or lose consciousness, for a few seconds or minutes right after the injury. You need to have someone watch you closely for the next 24 hours. Contact your regular doctor to discuss follow-up care. Follow-up care is a key part of your treatment and safety. Be sure to make and go to all appointments, and call your doctor if you are having problems. It's also a good idea to know your test results and keep a list of the medicines you take. How can you care for yourself at home? · Have another adult watch you closely for the next 24 hours. That person should check for signs that your head injury is getting worse. · Put ice or a cold pack on the sore area for 10 to 20 minutes at a time. Put a thin cloth between the ice and your skin. · Take an over-the-counter pain medicine, such as acetaminophen (Tylenol), ibuprofen (Advil, Motrin), or naproxen (Aleve). Read and follow all instructions on the label. · You may sleep. If your doctor tells you to, have another adult check you at the suggested times to make sure you are able to wake up, recognize the other adult, and act normally. · Take it easy for the next few days or longer if you are not feeling well. · Do not drink any alcohol for at least the next 24 hours. What is postconcussive syndrome? If you have had a mild concussion, you may have a mild headache or just feel \"not quite right. \" These symptoms are common and usually go away on their own over a few days to 4 weeks. Sometimes after a concussion you may feel as if you are not functioning as well as you did before the injury, and you may develop new symptoms. This is called postconcussive syndrome.  You may:  · Have changes in your ability to solve problems, think, concentrate, or remember. · Have headaches. · Have changes in your sleep patterns, such as not being able to sleep or sleeping all the time. · Have changes in your personality. · Lack interest in your daily activities. · Become easily angered or anxious for no clear reason. · Have changes in your sex drive. · Lose your sense of taste or smell. · Be dizzy, lightheaded, or unsteady and find it hard to stand or walk. When should you call for help? Call 911 anytime you think you may need emergency care. For example, call if:  · You have twitching, jerking, or a seizure. · You suddenly cannot walk or stand. · You passed out (lost consciousness). · You are confused, do not know where you are, or are very sleepy or hard to wake up. Call your doctor now or seek immediate medical care if:  · You continue to vomit after 2 hours, or you have new vomiting. · You have a new watery (not like mucus from a cold) or bloody fluid coming from your nose or ears. · You have new weakness or numbness in any part of your body. · You have trouble walking. · Your headaches get worse. · Your vision changes. Watch closely for changes in your health, and be sure to contact your doctor if:  · You do not get better as expected. Where can you learn more? Go to [employer]  Enter B594 in the search box to learn more about \"Closed Head Injury: After Your Visit. \"   © 9973-6969 [employer]. Care instructions adapted under license by [facility] (which disclaims liability or warranty for this information). This care instruction is for use with your licensed healthcare professional. If you have questions about a medical condition or this instruction, always ask your healthcare professional. [place] [address] any warranty or liability for your use of this information.   Content Version: 3.4.300897; Last Revised: June 27, 2012

## 2019-05-14 NOTE — ED NOTES
Patient's family concerned that the patient should be evaluated for back pain. Patient reports that his back pain is present when the head of bead was declined suddenly. MD made aware

## 2019-05-14 NOTE — ED PROVIDER NOTES
80 y.o. male with past medical history significant for HTN, CLINT, GERD, elevated cholesterol, DJD, prostate cancer, hyperparathyroidism, and Grave's disease who presents from home via EMS with chief complaint of a fall. Pt's granddaughter reports she heard Pt fall in his room on a baby monitor ~1 hour ago. When the granddaughter checked on Pt, he was lying on his back, bleeding from his head, and he asked her to help him back up. Pt's granddaughter notes Pt was not confused at that time. Per Pt's daughter, Pt has prior hx of back fractures. Pt currently only c/o pain from his wound, but denies back and neck pain. Per daughter, Pt takes thyroid and cholesterol medication regularly, but she denies recent medications changes. Per daughter, Pt has not walked since he fell. When asked why Pt fell, he states he \"just got off-balance\". There are no other acute medical concerns at this time. PCP: Shona Brizuela MD 
 
Note written by Manish King, as dictated by Emilia Singh MD 4:29 PM 
 
 
  
 
Past Medical History:  
Diagnosis Date  Cancer [facility])   
 prostate  DJD (degenerative joint disease)  Elevated cholesterol  GERD (gastroesophageal reflux disease)  Hyperparathyroidism, primary ([facility] [address]  Hypertension  Obstructive sleep apnea   
 uses cpap  Thyroid disease HX of Graves Disease Past Surgical History:  
Procedure Laterality Date  HX HEENT    
 thyroidectomy  HX HEENT    
 tonsilectomy [address]  HX ORTHOPAEDIC    
 right femoral neck fracture  HX ORTHOPAEDIC    
 bilateral ankle surgery  HX ORTHOPAEDIC  12/12/12 LEFT ANKLE REFUSION AND REMOVAL POSTERIOR SCREW  
 ND COLONOSCOPY FLX DX W/COLLJ SPEC WHEN PFRMD  12/11/2006 Dr Young Masters  repeat 12/2011  ND ICAR CATHETER ABLATION ATRIOVENTR NODE FUNCTION N/A 2/25/2019  ABLATION AV NODE performed by Jarred Jung MD at [address] [address] [facility]  
  CO PROSTATE BIOPSY, NEEDLE, SATURATION SAMPLING    
 CO TRANSCATH INSERT OR REPLACE LEADLESS PM VENTR N/A 2/25/2019 INSERT OR REPLACE TRANSCATH PPM LEADLESS performed by Da Ferguson MD at [address], [facility] Family History:  
Problem Relation Age of Onset  Hypertension Mother  Heart Disease Father CAD  Heart Disease Brother Social History Socioeconomic History  Marital status:  Spouse name: Not on file  Number of children: Not on file  Years of education: Not on file  Highest education level: Not on file Occupational History  Not on file Social Needs  Financial resource strain: Not on file  Food insecurity:  
  Worry: Not on file Inability: Not on file  Transportation needs:  
  Medical: Not on file Non-medical: Not on file Tobacco Use  Smoking status: Never Smoker  Smokeless tobacco: Never Used Substance and Sexual Activity  Alcohol use: Yes Comment: rare  Drug use: No  
 Sexual activity: Not on file Lifestyle  Physical activity:  
  Days per week: Not on file Minutes per session: Not on file  Stress: Not on file Relationships  Social connections:  
  Talks on phone: Not on file Gets together: Not on file Attends [employer]ical service: Not on file Active member of club or organization: Not on file Attends meetings of clubs or organizations: Not on file Relationship status: Not on file  Intimate partner violence:  
  Fear of current or ex partner: Not on file Emotionally abused: Not on file Physically abused: Not on file Forced sexual activity: Not on file Other Topics Concern  Not on file Social History Narrative  Not on file ALLERGIES: Patient has no known allergies. Review of Systems Constitutional: Negative for activity change, appetite change and fatigue.   
HENT: Negative for ear pain, facial swelling, sore throat and trouble swallowing. Eyes: Negative for pain, discharge and visual disturbance. Respiratory: Negative for chest tightness, shortness of breath and wheezing. Cardiovascular: Negative for chest pain and palpitations. Gastrointestinal: Negative for abdominal pain, blood in stool, nausea and vomiting. Genitourinary: Negative for difficulty urinating, flank pain and hematuria. Musculoskeletal: Negative for arthralgias, back pain, joint swelling, myalgias and neck pain. Skin: Positive for wound. Negative for color change and rash. Neurological: Positive for headaches. Negative for dizziness, weakness and numbness. Hematological: Negative for adenopathy. Does not bruise/bleed easily. Psychiatric/Behavioral: Negative for behavioral problems, confusion and sleep disturbance. All other systems reviewed and are negative. Vitals:  
 05/14/19 1613 BP: 131/90 Pulse: 88 Resp: 18 Temp: 97.6 °F (36.4 °C) SpO2: 98% Physical Exam  
Constitutional: He is oriented to person, place, and time. He appears well-developed and well-nourished. No distress. HENT:  
Head: Normocephalic. Nose: Nose normal.  
Mouth/Throat: Oropharynx is clear and moist.  
Horizontal laceration 3 inches in length over occiput. No active bleeding. 2nd linear laceration over L side of scalp, 2.5 inches in length. Eyes: Pupils are equal, round, and reactive to light. Conjunctivae and EOM are normal. No scleral icterus. Neck: Normal range of motion. Neck supple. No JVD present. No tracheal deviation present. No thyromegaly present. No carotid bruits noted. C-spine non-tender. Cardiovascular: Normal rate, regular rhythm, normal heart sounds and intact distal pulses. Exam reveals no gallop and no friction rub. No murmur heard. Pulmonary/Chest: Effort normal and breath sounds normal. No respiratory distress. He has no wheezes. He has no rales. He exhibits no tenderness. Abdominal: Soft. Bowel sounds are normal. He exhibits no distension and no mass. There is no tenderness. There is no rebound and no guarding. Musculoskeletal: Normal range of motion. He exhibits no edema or tenderness. Back and T-spine are non-tender. Lymphadenopathy:  
  He has no cervical adenopathy. Neurological: He is alert and oriented to person, place, and time. He has normal reflexes. No cranial nerve deficit. Coordination normal.  
No focal neurological deficits. Skin: Skin is warm and dry. No rash noted. No erythema. Psychiatric: He has a normal mood and affect. His behavior is normal. Judgment and thought content normal.  
Nursing note and vitals reviewed. Note written by Manish Martins, as dictated by Chelly Sanchez MD 4:29 PM 
 
[facility] Wound Repair 
Date/Time: 5/14/2019 5:35 PM 
Performed by: attendingPreparation: skin prepped with Betadine Location details: scalp Wound length:7.6 - 12.5 cm Anesthesia: local infiltration Anesthesia: 
Local Anesthetic: lidocaine 1% with epinephrine Irrigation solution: saline Debridement: minimal 
Skin closure: 4-0 nylon Number of sutures: 14 Suture technique: interlocking. Approximation: close Patient tolerance: Patient tolerated the procedure well with no immediate complications My total time at bedside, performing this procedure was 1-15 minutes. Wound Repair 
Date/Time: 5/14/2019 6:42 PM 
Performed by: attendingPreparation: skin prepped with Betadine Location details: scalp Wound length:2.6 - 7.5 cm Anesthesia: local infiltration Anesthesia: 
Local anesthetic: Mepivicaine. Irrigation solution: saline Debridement: minimal 
Skin closure: 4-0 nylon Number of sutures: 10 Suture technique: interlocking. Approximation: close Patient tolerance: Patient tolerated the procedure well with no immediate complications My total time at bedside, performing this procedure was 1-15 minutes. Note written by Manish Arenas, as dictated by Tank Ludwig MD 6:00 PM 
 
 
 
 
CT head appears non acute Will suture laceration on back of the head and attempt to ambulate. Tank Ludwig MD 
 
 
PROGRESS NOTE: 
6:35 PM 
After ambulation, Pt's family is concerned about his back. He has no bruising or tenderness over his back. Will order XR of T, L spine. PROGRESS NOTE: 
7:48 PM 
XR shows chronic deformities in Pt's back, but there are no acute changes. Pt can be discharged home.

## 2019-05-14 NOTE — ED NOTES
Transfer of care to [facility] using [employer]. Patient resting on stretcher awaiting to be sutured.

## 2019-05-14 NOTE — ED NOTES
Patient ambulatory around nurses station with walker and RN at side. +Steady gait, no complaints of dizziness

## 2019-05-14 NOTE — ED TRIAGE NOTES
Pt comes in via EMS for unwitnessed fall. Per EMS pt lost his balance and fell, grandchild her pt fall and found pt on the floor. Pt is unsure but does not think he is on blood thinners. Pt has lac to head and incontinent of urine. Pt usually uses walker to ambulate. Denies dizziness, CP, SOB.

## 2019-06-23 PROBLEM — R53.81 DEBILITY: Status: ACTIVE | Noted: 2019-01-01

## 2019-06-23 PROBLEM — I50.9 CHF (CONGESTIVE HEART FAILURE) (HCC): Status: ACTIVE | Noted: 2019-01-01

## 2019-06-23 PROBLEM — G93.41 ACUTE METABOLIC ENCEPHALOPATHY: Status: ACTIVE | Noted: 2019-01-01

## 2019-06-23 NOTE — ED PROVIDER NOTES
80 y.o. male with past medical history significant for HTN, obstructive sleep apnea, GERD, elevated cholesterol, DJD, prostate cancer, and Graves disease who presents from home via EMS with chief complaint of altered mental status. 9:15 AM 
Pt presents to the ED very pale and diaphoretic with altered mental status. According to EMS, pt was found very diaphoretic this morning by his home health aid and his daughter. Pt was very lethargic and hard to arouse. EMS reported possible slurred speech. Last known well was last night prior to going to bed at an unknown time. Pt has a hx of a-fib and has a pacemaker. . Systolic pressures were initially in the 170's and decreased to 130's while arriving at the hospital. Heart rate ranged from the 90's to 120's. 9:40 AM 
Daughter arrived in the ED and provided a short history The patient is normally talkative and oriented to person, place but not time. Pt at baseline can ambulate and is normally not as weak as presenting today. Daughter states that the patient c/o dizziness this morning. Daughter also states that he was breathing heavy and was very weak. Daughter states that he was unable to sit up this morning. Daughter states that the swelling and redness on the right leg is new as of this week. Daughter states the patient asked to call EMS, which is highly unusual. Denies any recent falls since last admission. There are no other acute medical concerns at this time. Social hx: Never smoker. PCP: Dane Durant MD 
 
Full history, physical exam, and ROS unable to be obtained due to:  Poor historian and hard of hearing. Note written by teresa Malagon, as dictated by John Landaverde MD 9:15 AM 
 
 
The history is provided by the patient, the EMS personnel, a relative and medical records. No  was used. Past Medical History:  
Diagnosis Date  Cancer [facility])   
 prostate  DJD (degenerative joint disease)  Elevated cholesterol  GERD (gastroesophageal reflux disease)  Hyperparathyroidism, primary ([address]  Hypertension  Obstructive sleep apnea   
 uses cpap  Thyroid disease HX of Graves Disease Past Surgical History:  
Procedure Laterality Date  HX HEENT    
 thyroidectomy  HX HEENT    
 tonsilectomy [address]  HX ORTHOPAEDIC    
 right femoral neck fracture  HX ORTHOPAEDIC    
 bilateral ankle surgery  HX ORTHOPAEDIC  12/12/12 LEFT ANKLE REFUSION AND REMOVAL POSTERIOR SCREW  
 TX COLONOSCOPY FLX DX W/COLLJ SPEC WHEN PFRMD  12/11/2006 Dr Lali King  repeat 12/2011  TX ICAR CATHETER ABLATION ATRIOVENTR NODE FUNCTION N/A 2/25/2019 ABLATION AV NODE performed by Simba Hernandez MD at [address] [facility] [address] CATH LAB  TX PROSTATE BIOPSY, NEEDLE, SATURATION SAMPLING    
 TX TRANSCATH INSERT OR REPLACE LEADLESS PM VENTR N/A 2/25/2019 INSERT OR REPLACE TRANSCATH PPM LEADLESS performed by Simba Hernandez MD at [address] [facility]  CATH LAB Family History:  
Problem Relation Age of Onset  Hypertension Mother  Heart Disease Father CAD  Heart Disease Brother Social History Socioeconomic History  Marital status:  Spouse name: Not on file  Number of children: Not on file  Years of education: Not on file  Highest education level: Not on file Occupational History  Not on file Social Needs  Financial resource strain: Not on file  Food insecurity:  
  Worry: Not on file Inability: Not on file  Transportation needs:  
  Medical: Not on file Non-medical: Not on file Tobacco Use  Smoking status: Never Smoker  Smokeless tobacco: Never Used Substance and Sexual Activity  Alcohol use: Yes Comment: rare  Drug use: No  
 Sexual activity: Not on file Lifestyle  Physical activity:  
  Days per week: Not on file Minutes per session: Not on file  Stress: Not on file Relationships  Social connections:  
  Talks on phone: Not on file Gets together: Not on file Attends Scientologist service: Not on file Active member of club or organization: Not on file Attends meetings of clubs or organizations: Not on file Relationship status: Not on file  Intimate partner violence:  
  Fear of current or ex partner: Not on file Emotionally abused: Not on file Physically abused: Not on file Forced sexual activity: Not on file Other Topics Concern  Not on file Social History Narrative  Not on file ALLERGIES: Patient has no known allergies. Review of Systems Unable to perform ROS: Other (Poor Historian and Hard of Hearing) Constitutional: Positive for activity change (non-ambulatory), diaphoresis ( heavily ) and fatigue (\"lethargic\"). Respiratory: Positive for shortness of breath ( \"heavy breathing\"). Cardiovascular: Negative for chest pain. Neurological: Positive for dizziness and weakness (unable to sit up). Vitals:  
 06/23/19 0810 06/23/19 0935 BP: (!) 127/96 Pulse: (!) 101 Resp: 15 Temp: 97.7 °F (36.5 °C) SpO2: 91% 95% Weight: 99.1 kg (218 lb 7.6 oz) Height: 6' 2\" (1.88 m) Physical Exam  
 
Nursing note and vitals reviewed. Constitutional: ELDERLY, PALE, DIAPHORETIC. Appears ILL. HENT:  
Head: Normocephalic and atraumatic. Sclera anicteric Nose: No rhinorrhea Mouth/Throat: Oropharynx is clear and MILDLY DRY. Pharynx normal 
Eyes: Conjunctivae are normal. Pupils are equal, round, and reactive to light. Right eye exhibits no discharge. Left eye exhibits no discharge. No scleral icterus. Neck: Painless normal range of motion. Supple Cardiovascular: Normal rate, regular rhythm, normal heart sounds and intact distal pulses. Exam reveals no gallop and no friction rub. No murmur heard.  
Pulmonary/Chest: Effort normal and breath sounds normal. No respiratory distress. no wheezes. no rales. Abdominal: Soft. Bowel sounds are normal. Exhibits no distension and no mass. No tenderness. No guarding. Musculoskeletal: Normal range of motion. no tenderness. 1+ EDEMA IN THE RIGHT LEG. Lymphadenopathy:   No cervical adenopathy. Neurological:  Alert and oriented to person, place, and NOT TIME. Coordination normal. CN 2-12 intact. Moving all extremities. NO ARM DRIFT. SYMMETRIC SMILE. Skin: Skin is COOL, PALE. PROFOUND DIAPHORESIS. MDM Number of Diagnoses or Management Options Critical Care Total time providing critical care: 30-74 minutes (40 Minutes. ) Total critical care time spent exclusive of procedures:  40 minutes. PALE, COOL, DIAPHORETIC. HE HAS NO COMPLAINTS. DENIES CP AND SOB. H/O PACEMAKER.  ASSYMETRIC LEG SIZE. DDX IS BROAD:  SEPSIS, UTI, PE, ANEMIA, MI, ICH, CVA (BUT NOT FOCAL EXAM) AND MANY OTHERS. CHECK ISTATS GIVEN ACUITY OF CONDITION, LABS, BLOOD CULTURE, RECTAL TEMP. IVF. Procedures EMS EKG interpretation: 8:40 AM 
Rhythm: paced; and regular . Rate (approx.): 100; Axis: normal; ST/T wave: normal. Wide QRS interval.  
Note written by teresa Soriano, as dictated by Minerva Garvey MD 9:15 AM 
 
 
ED EKG interpretation: 
Rhythm: ventricular paced; and regular . Rate (approx.): 99; Left axis deviation ; ST/T wave: normal. Wide QRS interval. 
Note written by teresa Soriano, as dictated by Minerva Garvey MD 9:20 AM 
 
PROGRESS NOTE: 
9:34 AM 
Rectal Temp of 97.7. OLD CHART REVIEW: 
9:36 AM 
Cardiologist: Dr. Giuseppe Newell and Dr. Collette Falcon. Pt is not anticoagulated due to frequent falls. Pt has a leadless pacemaker. Pt had an echo done during his last admission (February) which showed an ejection fraction of 46-50%. Aortic valve stenosis. PROGRESS NOTE: 
9:40 AM 
Obtained history from the family. Pt at baseline orientation x 2.    
 
PROGRESS NOTE: 
10:50 AM 
 Re-evaluated patient. Pt states he is feeling better. Mildly pale and improving. Seems more alert. Finishing 500 ml bolus. PROGRESS NOTE: 
10:53 AM 
Pacemaker Interrogation showed no signs of arrhythmia. 11:20 AM 
Patient is being admitted to the hospital.  The results of their tests and reasons for their admission have been discussed with them and/or available family. They convey agreement and understanding for the need to be admitted and for their admission diagnosis. Consultation will be made now with the inpatient physician for hospitalization. PT WITH CHF. ORDERED ECHO. PROGRESS NOTE: 
11:35 AM 
Spoke with the triage hospitalist NP, Pretty Garsia. Dr. Daniel May will admit the patient to the hospital.  
 
11:48 AM 
D/w Dr. Ervin Cameron, cardiologist.  He will consult. No other recommendations. Santy Casey MD 
 
 
Recent Results (from the past 24 hour(s)) EKG, 12 LEAD, INITIAL Collection Time: 06/23/19  9:20 AM  
Result Value Ref Range Ventricular Rate 99 BPM  
 Atrial Rate 69 BPM  
 QRS Duration 182 ms Q-T Interval 428 ms QTC Calculation (Bezet) 549 ms Calculated R Axis -31 degrees Calculated T Axis 133 degrees Diagnosis Ventricular-paced rhythm When compared with ECG of 22-FEB-2019 18:22, 
Previous ECG has undetermined rhythm, needs review URINALYSIS W/MICROSCOPIC Collection Time: 06/23/19  9:27 AM  
Result Value Ref Range Color YELLOW/STRAW Appearance CLEAR CLEAR Specific gravity 1.015 1.003 - 1.030    
 pH (UA) 5.5 5.0 - 8.0 Protein TRACE (A) NEG mg/dL Glucose NEGATIVE  NEG mg/dL Ketone NEGATIVE  NEG mg/dL Bilirubin NEGATIVE  NEG Blood SMALL (A) NEG Urobilinogen 1.0 0.2 - 1.0 EU/dL Nitrites NEGATIVE  NEG Leukocyte Esterase NEGATIVE  NEG    
 WBC 0-4 0 - 4 /hpf  
 RBC 0-5 0 - 5 /hpf Epithelial cells FEW FEW /lpf Bacteria NEGATIVE  NEG /hpf Hyaline cast 0-2 0 - 5 /lpf URINE CULTURE HOLD SAMPLE  
 Collection Time: 06/23/19  9:27 AM  
Result Value Ref Range Urine culture hold URINE ON HOLD IN MICROBIOLOGY DEPT FOR 3 DAYS. IF UNPRESERVED URINE IS SUBMITTED, IT CANNOT BE USED FOR ADDITIONAL TESTING AFTER 24 HRS, RECOLLECTION WILL BE REQUIRED. POC TROPONIN-I Collection Time: 06/23/19  9:27 AM  
Result Value Ref Range Troponin-I (POC) <0.04 0.00 - 0.08 ng/mL CBC WITH AUTOMATED DIFF Collection Time: 06/23/19  9:28 AM  
Result Value Ref Range WBC 4.8 4.1 - 11.1 K/uL  
 RBC 4.22 4.10 - 5.70 M/uL  
 HGB 13.6 12.1 - 17.0 g/dL HCT 42.6 36.6 - 50.3 % .9 (H) 80.0 - 99.0 FL  
 MCH 32.2 26.0 - 34.0 PG  
 MCHC 31.9 30.0 - 36.5 g/dL  
 RDW 15.9 (H) 11.5 - 14.5 % PLATELET 406 (L) 082 - 400 K/uL MPV 12.0 8.9 - 12.9 FL  
 NRBC 0.0 0  WBC ABSOLUTE NRBC 0.00 0.00 - 0.01 K/uL NEUTROPHILS 58 32 - 75 % LYMPHOCYTES 34 12 - 49 % MONOCYTES 6 5 - 13 % EOSINOPHILS 1 0 - 7 % BASOPHILS 1 0 - 1 % IMMATURE GRANULOCYTES 1 (H) 0.0 - 0.5 % ABS. NEUTROPHILS 2.7 1.8 - 8.0 K/UL  
 ABS. LYMPHOCYTES 1.6 0.8 - 3.5 K/UL  
 ABS. MONOCYTES 0.3 0.0 - 1.0 K/UL  
 ABS. EOSINOPHILS 0.0 0.0 - 0.4 K/UL  
 ABS. BASOPHILS 0.0 0.0 - 0.1 K/UL  
 ABS. IMM. GRANS. 0.1 (H) 0.00 - 0.04 K/UL  
 DF AUTOMATED METABOLIC PANEL, COMPREHENSIVE Collection Time: 06/23/19  9:28 AM  
Result Value Ref Range Sodium 142 136 - 145 mmol/L Potassium 3.6 3.5 - 5.1 mmol/L Chloride 111 (H) 97 - 108 mmol/L  
 CO2 20 (L) 21 - 32 mmol/L Anion gap 11 5 - 15 mmol/L Glucose 126 (H) 65 - 100 mg/dL BUN 19 6 - 20 MG/DL Creatinine 1.31 (H) 0.70 - 1.30 MG/DL  
 BUN/Creatinine ratio 15 12 - 20 GFR est AA >60 >60 ml/min/1.73m2 GFR est non-AA 52 (L) >60 ml/min/1.73m2 Calcium 10.5 (H) 8.5 - 10.1 MG/DL Bilirubin, total 0.9 0.2 - 1.0 MG/DL  
 ALT (SGPT) 18 12 - 78 U/L  
 AST (SGOT) 18 15 - 37 U/L Alk. phosphatase 114 45 - 117 U/L Protein, total 8.6 (H) 6.4 - 8.2 g/dL Albumin 3.2 (L) 3.5 - 5.0 g/dL Globulin 5.4 (H) 2.0 - 4.0 g/dL A-G Ratio 0.6 (L) 1.1 - 2.2    
TROPONIN I Collection Time: 06/23/19  9:28 AM  
Result Value Ref Range Troponin-I, Qt. <0.05 <0.05 ng/mL TYPE & SCREEN Collection Time: 06/23/19  9:28 AM  
Result Value Ref Range Crossmatch Expiration 06/26/2019 ABO/Rh(D) A POSITIVE Antibody screen NEG PROTHROMBIN TIME + INR Collection Time: 06/23/19  9:28 AM  
Result Value Ref Range INR 1.1 0.9 - 1.1 Prothrombin time 11.5 (H) 9.0 - 11.1 sec PTT Collection Time: 06/23/19  9:28 AM  
Result Value Ref Range aPTT 26.5 22.1 - 32.0 sec  
 aPTT, therapeutic range     58.0 - 77.0 SECS  
POC CHEM8 Collection Time: 06/23/19  9:28 AM  
Result Value Ref Range Calcium, ionized (POC) 1.37 (H) 1.12 - 1.32 mmol/L Sodium (POC) 144 136 - 145 mmol/L Potassium (POC) 3.7 3.5 - 5.1 mmol/L Chloride (POC) 112 (H) 98 - 107 mmol/L  
 CO2 (POC) 20 (L) 21 - 32 mmol/L Anion gap (POC) 17 10 - 20 mmol/L Glucose (POC) 128 (H) 65 - 100 mg/dL BUN (POC) 18 9 - 20 mg/dL Creatinine (POC) 1.2 0.6 - 1.3 mg/dL GFRAA, POC >60 >60 ml/min/1.73m2 GFRNA, POC 57 (L) >60 ml/min/1.73m2 Hematocrit (POC) 43 36.6 - 50.3 % Comment Comment Not Indicated. NT-PRO BNP Collection Time: 06/23/19  9:28 AM  
Result Value Ref Range NT pro-BNP 7,306 (H) <450 PG/ML  
POC LACTIC ACID Collection Time: 06/23/19  9:32 AM  
Result Value Ref Range Lactic Acid (POC) 2.08 (HH) 0.40 - 2.00 mmol/L  
SAMPLES BEING HELD Collection Time: 06/23/19  9:34 AM  
Result Value Ref Range SAMPLES BEING HELD 1RD COMMENT Add-on orders for these samples will be processed based on acceptable specimen integrity and analyte stability, which may vary by analyte. Ct Head Wo Cont Result Date: 6/23/2019 INDICATION: Exam: Noncontrast CT of the brain is performed with 5 mm collimation. CT dose reduction was achieved to the use of a standardized protocol tailored for this examination and automatic exposure control for dose modulation. Adaptive statistical iterative reconstruction (ASIR) was utilized. Direct comparison CT dated May 2019. FINDINGS: There is no acute intracranial hemorrhage, mass, mass effect or herniation. There is age-appropriate diffuse cortical atrophy with ex vacuo dilatation of the ventricular system. There are stable periventricular hypodensities consistent with chronic microvascular ischemic changes. There is no evidence of acute territorial infarct. The mastoid air cells are well pneumatized. The visualized paranasal sinuses are normal.  
 
IMPRESSION: No acute intracranial hemorrhage or infarct. Cta Chest W Or W Wo Cont Result Date: 6/23/2019 INDICATION: Shortness of breath. Pale. Diaphoretic. COMPARISON: CTA thorax. CT abd pel 08/26/2014. TECHNIQUE:  2.5 mm axial images were obtained from the bases to the lung apices after the intravenous administration of 100 cc of Isovue-370. Three-dimensional postprocessing was performed by the technologist with MIP reconstructions. Portal venous imaging was obtained through the abdomen and pelvis with sagittal and coronal reformats. Subsequent portal venous imaging of the abdomen and pelvis was performed with sagittal and coronal reformats. Oral contrast was not administered CT dose reduction was achieved through use of a standardized protocol tailored for this examination and automatic exposure control for dose modulation. FINDINGS: THYROID: No nodule. MEDIASTINUM: No mass or lymphadenopathy. BECKY: No mass or lymphadenopathy. THORACIC AORTA: Atherosclerosis. No aneurysm or dissection. MAIN PULMONARY ARTERY: No acute pulmonary embolus TRACHEA/BRONCHI: Patent. ESOPHAGUS: No wall thickening or dilatation. HEART: Normal in size.  Coronary artery calcifications. PLEURA: Small right pleural effusion. Trace left pleural effusion. LUNGS: No nodule, mass, or airspace disease. Perihilar peribronchial thickening. LIVER: No mass or biliary dilatation. GALLBLADDER: Gallstones. Otherwise unremarkable. SPLEEN: No mass. PANCREAS: No mass or ductal dilatation. ADRENALS: Unremarkable. KIDNEYS: No mass or hydronephrosis. Punctate non-obstructing nephrolithiasis interpolar right kidney and upper pole left kidney. Multiple areas of renal cortical scarring. STOMACH: Moderate sliding hiatal hernia. Otherwise unremarkable. SMALL BOWEL: No dilatation or wall thickening. COLON: No dilatation or wall thickening. Left and sigmoid colon diverticula. APPENDIX: Unremarkable. PERITONEUM: No ascites or pneumoperitoneum. RETROPERITONEUM: Atherosclerotic calcification without aneurysm or dissection. REPRODUCTIVE ORGANS: Seminal vesicles and prostate appear normal.  URINARY BLADDER: No mass or calculus. BONES: Right hip arthroplasty prosthesis with streak artifact obscuring adjacent tissues. Osteoarthritis and osteopenia. ADDITIONAL COMMENTS: N/A IMPRESSION: 1. No acute cardiopulmonary disease with small pleural effusions and peribronchial perihilar thickening. No acute pulmonary embolus 2. No acute abnormality of the abdomen or pelvis. 3.  Incidental findings as above. Ct Abd Pelv W Cont Result Date: 6/23/2019 INDICATION: Shortness of breath. Pale. Diaphoretic. COMPARISON: CTA thorax. CT abd pel 08/26/2014. TECHNIQUE:  2.5 mm axial images were obtained from the bases to the lung apices after the intravenous administration of 100 cc of Isovue-370. Three-dimensional postprocessing was performed by the technologist with MIP reconstructions. Portal venous imaging was obtained through the abdomen and pelvis with sagittal and coronal reformats. Subsequent portal venous imaging of the abdomen and pelvis was performed with sagittal and coronal reformats.  Oral contrast was not administered CT dose reduction was achieved through use of a standardized protocol tailored for this examination and automatic exposure control for dose modulation. FINDINGS: THYROID: No nodule. MEDIASTINUM: No mass or lymphadenopathy. BECKY: No mass or lymphadenopathy. THORACIC AORTA: Atherosclerosis. No aneurysm or dissection. MAIN PULMONARY ARTERY: No acute pulmonary embolus TRACHEA/BRONCHI: Patent. ESOPHAGUS: No wall thickening or dilatation. HEART: Normal in size. Coronary artery calcifications. PLEURA: Small right pleural effusion. Trace left pleural effusion. LUNGS: No nodule, mass, or airspace disease. Perihilar peribronchial thickening. LIVER: No mass or biliary dilatation. GALLBLADDER: Gallstones. Otherwise unremarkable. SPLEEN: No mass. PANCREAS: No mass or ductal dilatation. ADRENALS: Unremarkable. KIDNEYS: No mass or hydronephrosis. Punctate non-obstructing nephrolithiasis interpolar right kidney and upper pole left kidney. Multiple areas of renal cortical scarring. STOMACH: Moderate sliding hiatal hernia. Otherwise unremarkable. SMALL BOWEL: No dilatation or wall thickening. COLON: No dilatation or wall thickening. Left and sigmoid colon diverticula. APPENDIX: Unremarkable. PERITONEUM: No ascites or pneumoperitoneum. RETROPERITONEUM: Atherosclerotic calcification without aneurysm or dissection. REPRODUCTIVE ORGANS: Seminal vesicles and prostate appear normal.  URINARY BLADDER: No mass or calculus. BONES: Right hip arthroplasty prosthesis with streak artifact obscuring adjacent tissues. Osteoarthritis and osteopenia. ADDITIONAL COMMENTS: N/A IMPRESSION: 1. No acute cardiopulmonary disease with small pleural effusions and peribronchial perihilar thickening. No acute pulmonary embolus 2. No acute abnormality of the abdomen or pelvis. 3.  Incidental findings as above. Xr Chest [facility] Result Date: 6/23/2019 EXAM: XR CHEST PORT INDICATION: ams, diaphoretic, pale COMPARISON: Chest x-ray 12/13/2018 FINDINGS: A portable AP radiograph of the chest was obtained at 10:42 hours. The patient is on a cardiac monitor. There is congestion and interstitial edema with no consolidative infiltrate, nodule, or mass. No pleural effusion or pneumothorax. . The cardiac and mediastinal contours and pulmonary vascularity are normal.  Atherosclerotic calcifications affect the aortic arch and the thoracic aorta is tortuous. The chest wall structures and visualized upper abdomen show no acute findings with incidental note of degenerative spine and shoulder changes as well as diffuse osteopenia. IMPRESSION: Congestion with interstitial edema.

## 2019-06-23 NOTE — PROGRESS NOTES
Admission Medication Reconciliation: 
Information obtained from:  Meka Hendricks Comments/Recommendations: Updated PTA meds/reviewed patient's allergies. Spoke with daughter to update PTA medication list. She stated the patient only takes 2 prescription medication. Attempted to verify the 2 medications by calling [facility] (unsuccedful). Daughter was able to verify the medication by calling a family member who was in the patient's home. The following changes were made: 
 
 
1)  Medication changes (since last review): Added - Levothyroxine Adjusted 
- none Removed - Flomax (per daughter, it was D/C'ed by provider) 2) Unable to assess if patient is taking Aspirin, Vit D3 or Bisacodyl Allergies:  Patient has no known allergies. Significant PMH/Disease States:  
Past Medical History:  
Diagnosis Date Cancer [facility])   
 prostate DJD (degenerative joint disease) Elevated cholesterol GERD (gastroesophageal reflux disease) Hyperparathyroidism, primary ([facility] [address] Hypertension Obstructive sleep apnea   
 uses cpap Thyroid disease HX of Graves Disease Chief Complaint for this Admission: Chief Complaint Patient presents with Sweats Altered mental status Prior to Admission Medications:  
Prior to Admission Medications Prescriptions Last Dose Informant Patient Reported? Taking?  
aspirin delayed-release 81 mg tablet Unknown at Unknown time Other Yes No  
Sig: Take 81 mg by mouth daily. bisacodyl (DULCOLAX) 5 mg EC tablet Unknown at Unknown time  No No  
Sig: Take 1 Tab by mouth as needed for Constipation. cholecalciferol (VITAMIN D3) 1,000 unit tablet Unknown at Unknown time Other Yes No  
Sig: Take 1,000 Units by mouth daily. levothyroxine (SYNTHROID) 125 mcg tablet 6/23/2019 at Unknown time  Yes Yes Sig: Take 125 mcg by mouth Daily (before breakfast). pravastatin (PRAVACHOL) 40 mg tablet  Other No Yes Sig: Take 1 Tab by mouth daily. Must have made doctor appointment for refill. Facility-Administered Medications: None

## 2019-06-23 NOTE — PROGRESS NOTES
1700:  TRANSFER - IN REPORT: 
 
Verbal report received from Mago(name) on [facility]  being received from ED(unit) for routine progression of care Report consisted of patients Situation, Background, Assessment and  
Recommendations(SBAR). Information from the following report(s) SBAR, Kardex, ED Summary, Intake/Output, MAR, Recent Results and Cardiac Rhythm paced was reviewed with the receiving nurse. Opportunity for questions and clarification was provided. Assessment completed upon patients arrival to unit and care assumed. Skin tear present on L forearm. Cleaned and dressed with mepitel and sanna gauze. Pt incontinent. Changed gown and bed pads. Placed brief. VSS. No c/o pain. Bed alarm under pt for confusion, recent falls, and family reports that pt will try to get up on his own. 1710:  Pt vomited large amount of emesis.

## 2019-06-23 NOTE — ACP (ADVANCE CARE PLANNING)
Advance Care Planning Note Name: Gibson Owens YOB: 1931 MRN: [id number] Admission Date: 6/23/2019  9:14 AM 
 
Date of discussion: 6/23/2019 Active Diagnoses: 
 
Hospital Problems  Date Reviewed: 6/23/2019 Codes Class Noted POA  
 CHF (congestive heart failure) ([facility] ICD-10-CM: I50.9 ICD-9-CM: 428.0  6/23/2019 Yes Acute metabolic encephalopathy [facility]--: G93.41 
ICD-9-CM: 348.31  6/23/2019 Yes Debility ICD-10-CM: R53.81 ICD-9-CM: 799.3  6/23/2019 Yes These active diagnoses are of sufficient risk that focused discussion on advance care planning is indicated in order to allow the patient to thoughtfully consider personal goals of care, and if situations arise that prevent the ability to personally give input, to ensure appropriate representation of their personal desires for different levels and aggressiveness of care. Discussion:  
 
Persons present and participating in discussion: Gibson Owens, patient's daughter Manju Vernon and Amanda Villalobos MD. Discussion: Addressed decompensated medical problems, Co-morbidities, Advance Directives, Prognosis and confirmation of a Surrogate Decision Maker. Time Spent:  
 
Total time spent face-to-face in education and discussion: 16 minutes.   
 
Amanda Villalobos MD 
6/23/2019 
1:34 PM

## 2019-06-23 NOTE — ROUTINE PROCESS
TRANSFER - OUT REPORT: 
 
Verbal report given to Somalijovita (name) on Christy Hays  being transferred to [facility] (unit) for routine progression of care Report consisted of patients Situation, Background, Assessment and  
Recommendations(SBAR). Information from the following report(s) SBAR, ED Summary, [facility] and Recent Results was reviewed with the receiving nurse. Lines:  
Peripheral IV 06/23/19 Right Antecubital (Active) Site Assessment Clean, dry, & intact 6/23/2019  9:56 AM  
Phlebitis Assessment 0 6/23/2019  9:56 AM  
Infiltration Assessment 0 6/23/2019  9:56 AM  
   
Peripheral IV 06/23/19 Left Hand (Active) Site Assessment Clean, dry, & intact 6/23/2019  9:57 AM  
Phlebitis Assessment 0 6/23/2019  9:57 AM  
Infiltration Assessment 0 6/23/2019  9:57 AM  
  
 
Opportunity for questions and clarification was provided. Patient transported with: 
 [employer]

## 2019-06-23 NOTE — ED TRIAGE NOTES
Patient arrives from home. His daughter attempted to get him out of bed and was unable to so do so. He is typically able to get up and respond appropriately. Patient is alert to place and self. He is pale and diaphoretic.

## 2019-06-23 NOTE — CONSULTS
6/23/2019    Cardiology Consult  Note   [facility] Karena Doss M.D. , FJenaeAPRIYANKA.   --------PCP:-Mya Cassidy MD  
-----Subjective:  
Melonie Catalan is a 80 y.o. male Per ER doctor, presented with severe diaphoresis Resolved per nursing with oxygen Later given bumex Testing shows elevated pro BNP Pt unable to provide any history, denies current chest pain or shortness of breath Family just left but spoke to admitting team 
Lab Results Component Value Date/Time NT pro-BNP 7,306 (H) 06/23/2019 09:28 AM  
 initial bp 127/96 Triage note= 
daughter attempted to get him out of bed and was unable to so do so. He is typically able to get up and respond appropriately.  
  
Patient is alert to place and self. He is pale and diaphoretic. Prior cardiac history Sees Dr Cornelious Felty in our practice Admit 2-22-19 to 3-1-19 -dizzy spell fell, CT ok, went into afib Had some orthostatic hypotension not explained put on midodrine Unable to control afib rate do had PPM leadless with AV sole ablation 2-25-19 At that time EF 45-50% Hx HTN, CLINT, GERD XOl, Graves 5-14-19 scalp lac from fall Missed fu appt 6-13 Discussion/Plan/Impression: 1. Episode of pallor and diaphoresis with AMS 
--unclear source 
---not anemic, UA ok, trop is 0, creat fine, CT head and belly fine I cannot explain findings, se what enzymes and echo show 2. Hx of cardiomyopathy-EF slightly low, had no edema or rales to indicate CHF clinicaly The ProBNP is elevated , check repeat echo and enzymes 3. Persistent AFib, with AVN ablation and pacer-stable rhythm EKG- V pacing at 99, in afib 4. Hx orthostasis on midodrine at home 5. TSH 23-hypothyroidism 
following Lab Results Component Value Date/Time  Creatinine (POC) 1.2 06/23/2019 09:28 AM  
 Creatinine 1.31 (H) 06/23/2019 09:28 AM  
  
Results for orders placed or performed during the hospital encounter of 06/23/19 EKG, 12 LEAD, INITIAL Result Value Ref Range Ventricular Rate 99 BPM  
 Atrial Rate 69 BPM  
 QRS Duration 182 ms Q-T Interval 428 ms QTC Calculation (Bezet) 549 ms Calculated R Axis -31 degrees Calculated T Axis 133 degrees Diagnosis Ventricular-paced rhythm When compared with ECG of 22-FEB-2019 18:22, 
Previous ECG has undetermined rhythm, needs review Results for orders placed or performed in visit on 06/19/13 HOLTER MONITOR, 24 HOURS Narrative ECG Monitor/24 hours, Complete Reason for Holter Monitor   bradycardia Heartbeat Slowest 44 Average 59 Fastest  97 Results:  
Underlying Rhythm: Normal sinus rhythm Atrial Arrhythmias: premature atrial contractions; rare and 1 
atrial triplet AV Conduction: normal 
 
Ventricular Arrhythmias: premature ventricular contractions; Very 
frequent (17,088 in 24 hours) and frequent runs of ventricular 
bigeminy ST Segment Analysis:non-specific changes Symptom Correlation: No symptoms reported. Sarah Padilla MD   
 
 
 
  
 
 
  
Results for orders placed or performed in visit on 06/12/13 AMB POC EKG ROUTINE W/ 12 LEADS, INTER & REP Impression Marked sinus bradycardia with non-conducted PVCs   1ST degree 
heart block but no  2nd degree heart block. Borderline LVH with 
non-specific T wave abnormalities. Cardiac Studies/Hx: 
No specialty comments available. Medical history notable for  has a past medical history of Cancer ([facility] [address] DJD (degenerative joint disease), Elevated cholesterol, GERD (gastroesophageal reflux disease), Hyperparathyroidism, primary ([facility] [address] Hypertension, Obstructive sleep apnea, and Thyroid disease. Social history notable for  reports that he has never smoked. He has never used smokeless tobacco. He reports that he drinks alcohol. He reports that he does not use drugs.  
Family history notable for family history includes Heart Disease in his brother and father; Hypertension in his mother. Past Medical History:  
Diagnosis Date  Cancer [facility])   
 prostate  DJD (degenerative joint disease)  Elevated cholesterol  GERD (gastroesophageal reflux disease)  Hyperparathyroidism, primary ([address]  Hypertension  Obstructive sleep apnea   
 uses cpap  Thyroid disease HX of Graves Disease ROS-pertinents  negative except as above  The pertinent portions of the medical history,physician and nursing notes, meds,vitals , labs and Ins/Outs,are reviewed in the electronic record Pt reports   Limited ROS due to pt factors 
 and the remainder of a complete multisystem 11 ROS  Are reviewed and negative Social History Tobacco Use  Smoking status: Never Smoker  Smokeless tobacco: Never Used Substance Use Topics  Alcohol use: Yes Comment: rare  Drug use: No  
  
Family History Problem Relation Age of Onset  Hypertension Mother  Heart Disease Father CAD  Heart Disease Brother Prior to Admission Medications Prescriptions Last Dose Informant Patient Reported? Taking?  
aspirin delayed-release 81 mg tablet Unknown at Unknown time Other Yes No  
Sig: Take 81 mg by mouth daily. bisacodyl (DULCOLAX) 5 mg EC tablet Unknown at Unknown time  No No  
Sig: Take 1 Tab by mouth as needed for Constipation. cholecalciferol (VITAMIN D3) 1,000 unit tablet Unknown at Unknown time Other Yes No  
Sig: Take 1,000 Units by mouth daily. levothyroxine (SYNTHROID) 125 mcg tablet 6/23/2019 at Unknown time  Yes Yes Sig: Take 125 mcg by mouth Daily (before breakfast). pravastatin (PRAVACHOL) 40 mg tablet  Other No Yes Sig: Take 1 Tab by mouth daily. Must have made doctor appointment for refill. Facility-Administered Medications: None No Known Allergies Objective:  
 Physical Exam:  
Patient Vitals for the past 12 hrs: 
 Temp Pulse Resp BP SpO2  
06/23/19 1315  90 20 102/69 94 % 06/23/19 0935     95 % 06/23/19 0923 97.7 °F (36.5 °C) (!) 101 15 (!) 127/96 91 % General:  alert, cooperative, no distress, appears stated age No pallor or diaphoresis HEENT, Neck:  NC,AT- no JVD Chest Wall: inspection normal - no chest wall deformities or tenderness, respiratory effort normal  
Lung:   clear to auscultation bilaterally Heart:    normal rate, regular rhythm, normal S1, S2, no murmurs, rubs, clicks or gallops Abdomen: nondistended Extremities: extremities normal, atraumatic, no cyanosis or edema Last 24hr Input/Output: 
No intake or output data in the 24 hours ending 06/23/19 6203 Data Review:  
Recent Results (from the past 24 hour(s)) EKG, 12 LEAD, INITIAL Collection Time: 06/23/19  9:20 AM  
Result Value Ref Range Ventricular Rate 99 BPM  
 Atrial Rate 69 BPM  
 QRS Duration 182 ms Q-T Interval 428 ms QTC Calculation (Bezet) 549 ms Calculated R Axis -31 degrees Calculated T Axis 133 degrees Diagnosis Ventricular-paced rhythm When compared with ECG of 22-FEB-2019 18:22, 
Previous ECG has undetermined rhythm, needs review URINALYSIS W/MICROSCOPIC Collection Time: 06/23/19  9:27 AM  
Result Value Ref Range Color YELLOW/STRAW Appearance CLEAR CLEAR Specific gravity 1.015 1.003 - 1.030    
 pH (UA) 5.5 5.0 - 8.0 Protein TRACE (A) NEG mg/dL Glucose NEGATIVE  NEG mg/dL Ketone NEGATIVE  NEG mg/dL Bilirubin NEGATIVE  NEG Blood SMALL (A) NEG Urobilinogen 1.0 0.2 - 1.0 EU/dL Nitrites NEGATIVE  NEG Leukocyte Esterase NEGATIVE  NEG    
 WBC 0-4 0 - 4 /hpf  
 RBC 0-5 0 - 5 /hpf Epithelial cells FEW FEW /lpf Bacteria NEGATIVE  NEG /hpf Hyaline cast 0-2 0 - 5 /lpf URINE CULTURE HOLD SAMPLE Collection Time: 06/23/19  9:27 AM  
Result Value Ref Range Urine culture hold URINE ON HOLD IN MICROBIOLOGY DEPT FOR 3 DAYS.  IF UNPRESERVED URINE IS SUBMITTED, IT CANNOT BE USED FOR ADDITIONAL TESTING AFTER 24 HRS, RECOLLECTION WILL BE REQUIRED. POC TROPONIN-I Collection Time: 06/23/19  9:27 AM  
Result Value Ref Range Troponin-I (POC) <0.04 0.00 - 0.08 ng/mL CBC WITH AUTOMATED DIFF Collection Time: 06/23/19  9:28 AM  
Result Value Ref Range WBC 4.8 4.1 - 11.1 K/uL  
 RBC 4.22 4.10 - 5.70 M/uL  
 HGB 13.6 12.1 - 17.0 g/dL HCT 42.6 36.6 - 50.3 % .9 (H) 80.0 - 99.0 FL  
 MCH 32.2 26.0 - 34.0 PG  
 MCHC 31.9 30.0 - 36.5 g/dL  
 RDW 15.9 (H) 11.5 - 14.5 % PLATELET 200 (L) 906 - 400 K/uL MPV 12.0 8.9 - 12.9 FL  
 NRBC 0.0 0  WBC ABSOLUTE NRBC 0.00 0.00 - 0.01 K/uL NEUTROPHILS 58 32 - 75 % LYMPHOCYTES 34 12 - 49 % MONOCYTES 6 5 - 13 % EOSINOPHILS 1 0 - 7 % BASOPHILS 1 0 - 1 % IMMATURE GRANULOCYTES 1 (H) 0.0 - 0.5 % ABS. NEUTROPHILS 2.7 1.8 - 8.0 K/UL  
 ABS. LYMPHOCYTES 1.6 0.8 - 3.5 K/UL  
 ABS. MONOCYTES 0.3 0.0 - 1.0 K/UL  
 ABS. EOSINOPHILS 0.0 0.0 - 0.4 K/UL  
 ABS. BASOPHILS 0.0 0.0 - 0.1 K/UL  
 ABS. IMM. GRANS. 0.1 (H) 0.00 - 0.04 K/UL  
 DF AUTOMATED METABOLIC PANEL, COMPREHENSIVE Collection Time: 06/23/19  9:28 AM  
Result Value Ref Range Sodium 142 136 - 145 mmol/L Potassium 3.6 3.5 - 5.1 mmol/L Chloride 111 (H) 97 - 108 mmol/L  
 CO2 20 (L) 21 - 32 mmol/L Anion gap 11 5 - 15 mmol/L Glucose 126 (H) 65 - 100 mg/dL BUN 19 6 - 20 MG/DL Creatinine 1.31 (H) 0.70 - 1.30 MG/DL  
 BUN/Creatinine ratio 15 12 - 20 GFR est AA >60 >60 ml/min/1.73m2 GFR est non-AA 52 (L) >60 ml/min/1.73m2 Calcium 10.5 (H) 8.5 - 10.1 MG/DL Bilirubin, total 0.9 0.2 - 1.0 MG/DL  
 ALT (SGPT) 18 12 - 78 U/L  
 AST (SGOT) 18 15 - 37 U/L Alk. phosphatase 114 45 - 117 U/L Protein, total 8.6 (H) 6.4 - 8.2 g/dL Albumin 3.2 (L) 3.5 - 5.0 g/dL Globulin 5.4 (H) 2.0 - 4.0 g/dL A-G Ratio 0.6 (L) 1.1 - 2.2    
TROPONIN I  Collection Time: 06/23/19  9:28 AM  
 Result Value Ref Range Troponin-I, Qt. <0.05 <0.05 ng/mL TYPE & SCREEN Collection Time: 06/23/19  9:28 AM  
Result Value Ref Range Crossmatch Expiration 06/26/2019 ABO/Rh(D) A POSITIVE Antibody screen NEG PROTHROMBIN TIME + INR Collection Time: 06/23/19  9:28 AM  
Result Value Ref Range INR 1.1 0.9 - 1.1 Prothrombin time 11.5 (H) 9.0 - 11.1 sec PTT Collection Time: 06/23/19  9:28 AM  
Result Value Ref Range aPTT 26.5 22.1 - 32.0 sec  
 aPTT, therapeutic range     58.0 - 77.0 SECS  
POC CHEM8 Collection Time: 06/23/19  9:28 AM  
Result Value Ref Range Calcium, ionized (POC) 1.37 (H) 1.12 - 1.32 mmol/L Sodium (POC) 144 136 - 145 mmol/L Potassium (POC) 3.7 3.5 - 5.1 mmol/L Chloride (POC) 112 (H) 98 - 107 mmol/L  
 CO2 (POC) 20 (L) 21 - 32 mmol/L Anion gap (POC) 17 10 - 20 mmol/L Glucose (POC) 128 (H) 65 - 100 mg/dL BUN (POC) 18 9 - 20 mg/dL Creatinine (POC) 1.2 0.6 - 1.3 mg/dL GFRAA, POC >60 >60 ml/min/1.73m2 GFRNA, POC 57 (L) >60 ml/min/1.73m2 Hematocrit (POC) 43 36.6 - 50.3 % Comment Comment Not Indicated. NT-PRO BNP Collection Time: 06/23/19  9:28 AM  
Result Value Ref Range NT pro-BNP 7,306 (H) <450 PG/ML  
POC LACTIC ACID Collection Time: 06/23/19  9:32 AM  
Result Value Ref Range Lactic Acid (POC) 2.08 (HH) 0.40 - 2.00 mmol/L  
SAMPLES BEING HELD Collection Time: 06/23/19  9:34 AM  
Result Value Ref Range SAMPLES BEING HELD 1RD COMMENT Add-on orders for these samples will be processed based on acceptable specimen integrity and analyte stability, which may vary by analyte. T4, FREE Collection Time: 06/23/19  9:34 AM  
Result Value Ref Range T4, Free 1.1 0.8 - 1.5 NG/DL  
TSH 3RD GENERATION Collection Time: 06/23/19  9:34 AM  
Result Value Ref Range TSH 22.90 (H) 0.36 - 3.74 uIU/mL  
LIPID PANEL Collection Time: 06/23/19  9:34 AM  
Result Value Ref Range  LIPID PROFILE        
 Cholesterol, total 138 <200 MG/DL Triglyceride 73 <150 MG/DL  
 HDL Cholesterol 42 MG/DL  
 LDL, calculated 81.4 0 - 100 MG/DL VLDL, calculated 14.6 MG/DL  
 CHOL/HDL Ratio 3.3 0.0 - 5.0 Lab Results Component Value Date/Time Cholesterol, total 138 06/23/2019 09:34 AM  
 Cholesterol, total 143 03/22/2016 10:21 AM  
 Cholesterol, total 139 05/15/2015 11:32 AM  
 Cholesterol, total 150 12/11/2014 12:12 PM  
 Cholesterol, total 157 06/12/2014 08:53 AM  
 HDL Cholesterol 42 06/23/2019 09:34 AM  
 HDL Cholesterol 53 03/22/2016 10:21 AM  
 HDL Cholesterol 44 05/15/2015 11:32 AM  
 HDL Cholesterol 51 12/11/2014 12:12 PM  
 HDL Cholesterol 51 06/12/2014 08:53 AM  
 LDL, calculated 81.4 06/23/2019 09:34 AM  
 LDL, calculated 78 03/22/2016 10:21 AM  
 LDL, calculated 74 05/15/2015 11:32 AM  
 LDL, calculated 88 12/11/2014 12:12 PM  
 LDL, calculated 92 06/12/2014 08:53 AM  
 Triglyceride 73 06/23/2019 09:34 AM  
 Triglyceride 60 03/22/2016 10:21 AM  
 Triglyceride 106 05/15/2015 11:32 AM  
 Triglyceride 56 12/11/2014 12:12 PM  
 Triglyceride 71 06/12/2014 08:53 AM  
 CHOL/HDL Ratio 3.3 06/23/2019 09:34 AM  
 CHOL/HDL Ratio 2.7 06/23/2010 09:08 AM  
 CHOL/HDL Ratio 3.1 10/12/2009 08:50 AM  
 CHOL/HDL Ratio 3.3 03/12/2009 08:30 AM  
  
Jersey Santiago MD 6/23/2019

## 2019-06-23 NOTE — H&P
[address] [place]  HISTORY AND PHYSICAL Name:  Alexandria Corado 
MR#:  [id number] :  [id number] ACCOUNT #:  [de-identified] ADMIT DATE:  2019 PRIMARY CARE PHYSICIAN:  Samantha Hicks MD 
 
PRIMARY CARDIOLOGIST:  Price Echavarria MD 
 
CHIEF COMPLAINT:  Shortness of breath, dizziness, confusion. HISTORY OF PRESENTING ILLNESS:  Please note most of the history was obtained from the emergency room physician with records, EMS records, and the patient's daughter Ilan Prado at the bedside as the patient is hard of hearing at baseline and still somewhat confused and unable to give all pertinent history. 80-year-old gentleman with a past medical history significant for primary hypertension, obstructive sleep apnea, atrial fibrillation and not on chronic anticoagulation due to concerns for falls, pacemaker placement, gastroesophageal reflux disease, dyslipidemia, degenerative joint disease, prostate cancer, and Graves disease was brought to the emergency room from home by EMS for complaints of altered mental status, shortness of breath, and lethargy. Per the patient's daughter at the bedside, the patient was at his baseline health up until the morning of emergency room presentation when the patient was noted to be slightly confused and unable to get out of bed. The patient also noted with worsening shortness of breath. Per the documentation, on EMS's arrival, the patient was noted with a blood sugar level of 463 mg/dL, systolic blood pressure of 170, and heart rate ranging from 90s to 120s. Per the patient's daughter, the patient without any history of recent falls or trauma; no complaints of headaches, visual deficits, chest pains, palpitations, nausea, vomiting, cough, fever, chills, abdominal pain, bladder or bowel irregularities. However, the patient did complain of some dizziness surrounding the acute events. PAST MEDICAL HISTORY: 1.  Primary hypertension. 2.  Dyslipidemia. 3.  Chronic atrial fibrillation. 4.  Obstructive sleep apnea. 5.  Gastroesophageal reflux disease. 6.  Degenerative joint disease. 7.  Prostate cancer. 8.  Graves disease. PAST SURGICAL HISTORY: 
1. Thyroidectomy. 2.  Tonsillectomy. 3.  Right femoral neck surgery. 4.  Bilateral ankle surgery. 5.  Colonoscopy. 6.  Pacemaker placement. 7.  Cardiac ablation. 8.  Prostate biopsy. HOME MEDICATIONS: 
1. Aspirin 81 mg p.o. daily. 2.  Dulcolax 5 mg 1 tablet p.o. daily as needed for constipation. 3.  Cholecalciferol 1000 units p.o. daily. 4.  Pravastatin 40 mg p.o. daily. 5.  Tamsulosin 0.4 mg 1 capsule p.o. daily. ALLERGIES:  NO KNOWN DRUG ALLERGIES. SOCIAL HISTORY:  Significant for living at home with his daughter with an around-the-clock private nursing. No history of tobacco use disorder or alcohol use disorder. The patient ambulates with the aid of a walker at baseline. FAMILY HISTORY:  Reviewed and positive for coronary artery disease on the paternal side of the family. No history of diabetes mellitus or malignancy. REVIEW OF SYSTEMS:  A complete system review conducted. Questions were answered primarily by the patient's daughter, negative otherwise as outlined in the history of the presenting illness. PHYSICAL EXAMINATION: 
GENERAL:  The patient appeared ill-looking but was not in overt distress. VITAL SIGNS:  Blood pressure of 127/96, heart rate of 101, respiratory rate 15, afebrile, saturating 95% on 2 liters nasal cannula. HEAD:  Normocephalic. Pupils equal and reactive to light. ENT:  Ears, nose, and throat clear. NECK:  No jugular venous distention or carotid bruits. CARDIOVASCULAR:  S1, S2 present. RESPIRATORY:  Lungs with decreased air entry at both bases with bibasilar crepitations, left more than right. No rhonchi appreciated. GI:  Bowel sounds present. The abdomen is soft, nontender. No rebound, no guarding. GENITOURINARY:  Nil of note. MUSCULOSKELETAL:  About a 2+ bipedal edema. No asymmetry of the extremities. SKIN:  Chronic erythema, bilateral lower extremities. CNS:  The patient was awake, alert, oriented to place and person. LABORATORY/RADIOGRAPHIC FINDINGS:  A CT of the head without contrast without any acute intracranial abnormalities. A CT of the chest with contrast that showed some small pleural effusions and peribronchial, perihilar thickening. No acute pulmonary embolus. A CT of the abdomen and pelvis without any acute intra-abdominal or intrapelvic findings. A 12-lead EKG that was personally reviewed that showed ventricular paced rhythm at 99 per minute. Metabolic panel with a sodium of 142, potassium 3.6, chloride of 111, bicarbonate of 20, BUN of 19, creatinine of 1.31, glucose of 126, calcium 10.5. Normal liver function tests. First troponin less than 0.05. ProBNP of 7306. CBC with a WBC of 4.8, hemoglobin 13.6, hematocrit 42.6 with a platelet count of 900. A urinalysis with yellow clear urine, negative nitrites, negative leukocyte esterase, wbc's 0-4, negative bacteria. A coagulation profile with an INR of 1.1, PT of 11.5. Most recent 2-D echocardiogram reviewed from 02/22/2019 reviewed with an ejection fraction of 46-50%. ASSESSMENT AND PLAN: 
1. Central nervous system. The patient will be admitted to the inpatient level monitored floor for further evaluation of acute probable multifactorial metabolic encephalopathy. No focal neurologic deficits. CT of the head without contrast negative for acute findings. Neuro checks. 2.  Cardiology. 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 Unassigned New York Heart Association functional class. Fairly well-controlled primary hypertension. Dyslipidemia. Chronic atrial fibrillation. History of permanent pacemaker placement. No acute ischemia noted.   We will initiate oxygen via nasal cannula, IV diuresis, daily weight, input/output monitoring, and congestive heart failure teaching. Cardiology  And Heart Failure Team consults. 3.  Anticoagulation. [id number] score of at least 4, predisposing to increased risk of thromboembolism. However, no chronic anticoagulation due to increased falls risk, predisposing to bleeding diathesis. Risks/benefits discussed  with the patient and family. 4.  Renal.  Probable dehydration. Close monitoring of BUN and creatinine while being diuresed. 5.  Gastrointestinal.  History of gastroesophageal reflux disease. 6.  Oncology. History of prostate cancer. 7.  Endocrinology. History of Graves disease treated with thyroidectomy. Continue daily Levothyroxine. 8.  Respiratory. History of obstructive sleep apnea. 9.  Prophylaxis for deep venous thrombosis. 10. Directives. Full code with an extremely guarded prognosis. Discussed with the patient and daughter Alex Canas. In summary, an 30-year-old gentleman with a few medical problems including chronic atrial fibrillation without any chronic anticoagulation and permanent pacemaker placement is being admitted to the inpatient level monitored floor for management of acute probable multifactorial metabolic encephalopathy, probable acute combined systolic and diastolic congestive heart failure, dehydration, and debility. The patient is at increased risk of further decompensation and will benefit from close inpatient management including with IV diuresis, monitoring for any ischemia or dysrhythmias, a current 2-D echocardiogram to evaluate LV and valvular function,  Heart failure Team and Cardiology consults. The entire admission plans discussed in detail with the patient and the patient's daughter, Grazyna Santos, who can be reached at cell [phone]. All questions and concerns were addressed.  
 
The patient's functional status prior to admission significant for the patient being able to ambulate with the aid of a front-wheeled walker. Total time for admission 50 minutes of which at least 50% of the time was spent in plan of care and counseling of the patient. Heriberto Huber MD 
 
 
[id number] 
D:  06/23/2019 13:30 
T:  06/23/2019 13:39 
JOB #:  [id number]

## 2019-06-24 NOTE — PROGRESS NOTES
Physical Therapy 6/24/2019 Orders received, acknowledged, and appreciated. Chart reviewed. Patient sleeping upon PT's entrance into room. Daughter sitting at bedside and requesting to defer PT evaluation at this time citing patient's drowsiness and vomiting. Will follow up tomorrow for PT evaluation. Thank you. Brock June, PT, DPT Per CM, daughter, and OT: 
- Patient is primarily homebound - Patient relies on [employer] for private caregivers (self-care, daily ADLs) - Patient ambulates short-distances with assist and rolling walker - Family provides transportation

## 2019-06-24 NOTE — PROGRESS NOTES
Orders received, chart reviewed and patient evaluated by occupational therapy. Patient may require short SNF stay vs. Chela Dubbharathet pending progression with skilled acute occupational therapy. Recommend with nursing patient to complete as able in order to maintain strength, endurance and independence: bed to chair position 3x/day, ADLs with supervision/setup and mobilizing self in bed in prep for toileting with 2 assist. Thank you for your assistance. Full evaluation to follow.

## 2019-06-24 NOTE — CONSULTS
Palliative Medicine Consult Danilo: [phone]) Patient Name: Melonie Catalan YOB: 1931 Date of Initial Consult: June 24, 2019 Reason for Consult: Care Decisions Requesting Provider: Dr. Goddard Primary Care Physician: Osiel Powell MD 
 
 SUMMARY:  
Melonie Catalan is a 80 y.o. with a past history of afib, PPM,HTN, CLINT, GERD, hypercholesterolemia, DJD, prostate cancer, Nikolai, Graces disease, who was admitted on 6/23/2019 from home due to AMS,dizziness,shortness of breath, with a diagnosis of Metabolic encephalopathy, dehydration, elevated Bnp without overt CHF s/s of exacerbation. Head CT neg for acute issues. Current medical issues leading to Palliative Medicine involvement include: support with care decisions. Full Code status. Social:  since 2006, uses walker, fell March 2019 and has had 24 hour caregivers since, needs help with most ADL's, able to travel to pcp's office with assistance, 2 children, born in [place] but moved to [place] 60 years ago, retired from [employer] PALLIATIVE DIAGNOSES:  
1. Acute metabolic encephalopathy 2. Dehydration 3. Malaise 4. shortness of breath 5. debility PLAN:  
1. Pt seen with 2 daughters~ Yaa Oneida and [place] Celina at the bedside. Services introduced 2. Pt did not participate in the discussion as he was sleeping after physical therapy session and family still reports some confusion 3. Discussed patient's condition, care at home, concerns, options for care at home, pt wishes, ACP 4. The pt does not have an AMD.  Baseline he is able to make his own decisions 5. Family feels care needs are well met in the home. One daughter also lives in the home 6. Main concern is the patient's risk of falling 7. They are not interested or have a need for any of our outpatient programs 8. Family would appreciate a care goals discussion involving the patient. I have planned to f/u tomorrow to see if he is able 5. Family would prefer he make his own decisions regarding code status if possible as they have never discussed any of his wishes 10. Will follow up 11. Contact information provided 12. Initial consult note routed to primary continuity provider and/or primary health care team members 13. Communicated plan of care with: Palliative [facility] Team 
 
 GOALS OF CARE / TREATMENT PREFERENCES:  
 
GOALS OF CARE: 
Patient/Health Care Proxy Stated Goals: Other (comment) TREATMENT PREFERENCES:  
Code Status: Full Code Advance Care Planning: 
[x] [facility] Interdisciplinary Team has updated the ACP Navigator with [place] and Patient Capacity Primary Decision Maker: Preeti Lopez - [phone] Primary Decision Maker: [facility] - [phone] Advance Care Planning 6/24/2019 Patient's Healthcare Decision Maker is: - Confirm Advance Directive None Does the patient have other document types - Medical Interventions: Full interventions Other Instructions: Other: As far as possible, the palliative care team has discussed with patient / health care proxy about goals of care / treatment preferences for patient. HISTORY:  
 
History obtained from:  Chart, family CHIEF COMPLAINT: pt admitted with aforementioned issues HPI/SUBJECTIVE: The patient is:  
[] Verbal and participatory [x] Non-participatory due to:  
somnolence Clinical Pain Assessment (nonverbal scale for severity on nonverbal patients):  
Clinical Pain Assessment Severity: 0 Duration: for how long has pt been experiencing pain (e.g., 2 days, 1 month, years) Frequency: how often pain is an issue (e.g., several times per day, once every few days, constant) FUNCTIONAL ASSESSMENT:  
 
Palliative Performance Scale (PPS): PPS: 40  PSYCHOSOCIAL/SPIRITUAL SCREENING:  
 
Palliative IDT has assessed this patient for cultural preferences / practices and a referral made as appropriate to needs (Cultural Services, Patient Advocacy, Ethics, etc.) Any spiritual / Pentecostalism concerns: 
[] Yes /  [x] No 
 
Caregiver Burnout: 
[] Yes /  [x] No /  [] No Caregiver Present Anticipatory grief assessment:  
[x] Normal  / [] Maladaptive ESAS Anxiety: Anxiety: 0 
 
ESAS Depression:    
 
 
 REVIEW OF SYSTEMS:  
 
Positive and pertinent negative findings in ROS are noted above in HPI. The following systems were [x] reviewed objectively/ [] unable to be reviewed as noted in HPI Other findings are noted below. Systems: constitutional, ears/nose/mouth/throat, respiratory, gastrointestinal, genitourinary, musculoskeletal, integumentary, neurologic, psychiatric, endocrine. Positive findings noted below. Modified ESAS Completed by: provider Fatigue: 6 Pain: 0 Anxiety: 0 Dyspnea: 0 Stool Occurrence(s): 1 PHYSICAL EXAM:  
 
From RN flowsheet: 
Wt Readings from Last 3 Encounters:  
06/24/19 200 lb (90.7 kg) 03/01/19 220 lb 10.9 oz (100.1 kg) 12/13/18 230 lb (104.3 kg) Blood pressure 95/63, pulse 94, temperature 96.6 °F (35.9 °C), resp. rate 18, height 6' 2\" (1.88 m), weight 200 lb (90.7 kg), SpO2 95 %. Pain Scale 1: Numeric (0 - 10) Pain Intensity 1: 0 Last bowel movement, if known:  
 
Constitutional: somnolent Eyes:  
ENMT: no nasal discharge, moist mucous membranes Cardiovascular: distal pulses intact Respiratory: breathing not labored, symmetric Gastrointestinal: soft non-tender, Musculoskeletal: no deformity, no tenderness to palpation Skin: warm, dry Neurologic: somnolent, moving all extremities, malaise Psychiatric:neg agitaiton Other: 
 
 
 HISTORY:  
 
Active Problems: 
  CHF (congestive heart failure) ([address] (6/23/2019) Acute metabolic encephalopathy (5/06/0905) Debility (6/23/2019) Past Medical History:  
Diagnosis Date  Cancer [facility])   
 prostate  DJD (degenerative joint disease)  Elevated cholesterol  GERD (gastroesophageal reflux disease)  Hyperparathyroidism, primary ([address]  Hypertension  Obstructive sleep apnea   
 uses cpap  Thyroid disease HX of Graves Disease Past Surgical History:  
Procedure Laterality Date  HX HEENT    
 thyroidectomy  HX HEENT    
 tonsilectomy [address]  HX ORTHOPAEDIC    
 right femoral neck fracture  HX ORTHOPAEDIC    
 bilateral ankle surgery  HX ORTHOPAEDIC  12/12/12 LEFT ANKLE REFUSION AND REMOVAL POSTERIOR SCREW  
 [place] COLONOSCOPY FLX DX W/COLLJ SPEC WHEN PFRMD  12/11/2006 Dr Mari Alfredo  repeat 12/2011  [place] ICAR CATHETER ABLATION ATRIOVENTR NODE FUNCTION N/A 2/25/2019 ABLATION AV NODE performed by Juan José Castro MD at [address] CATH LAB  [place] PROSTATE BIOPSY, NEEDLE, SATURATION SAMPLING    
 [place] TRANSCATH INSERT OR REPLACE LEADLESS PM VENTR N/A 2/25/2019 INSERT OR REPLACE TRANSCATH PPM LEADLESS performed by Juan José Castro MD at [address] CATH LAB Family History Problem Relation Age of Onset  Hypertension Mother  Heart Disease Father CAD  Heart Disease Brother History reviewed, no pertinent family history. Social History Tobacco Use  Smoking status: Never Smoker  Smokeless tobacco: Never Used Substance Use Topics  Alcohol use: Yes Comment: rare No Known Allergies Current Facility-Administered Medications Medication Dose Route Frequency  sodium chloride (NS) flush  famotidine (PF) (PEPCID) 20 mg in sodium chloride 0.9% 10 mL injection  20 mg IntraVENous DAILY  carvedilol (COREG) tablet 3.125 mg  3.125 mg Oral BID WITH MEALS  ondansetron (ZOFRAN) injection 4 mg  4 mg IntraVENous Q6H PRN  
 acetaminophen (TYLENOL) tablet 650 mg  650 mg Oral Q4H PRN  
 docusate sodium (COLACE) capsule 100 mg  100 mg Oral PRN  
  heparin (porcine) injection 5,000 Units  5,000 Units SubCUTAneous Q12H  aspirin delayed-release tablet 81 mg  81 mg Oral DAILY  bisacodyl (DULCOLAX) tablet 5 mg  5 mg Oral PRN  cholecalciferol (VITAMIN D3) tablet 1,000 Units  1,000 Units Oral DAILY  pravastatin (PRAVACHOL) tablet 40 mg  40 mg Oral DAILY  levothyroxine (SYNTHROID) tablet 150 mcg  150 mcg Oral ACB  
 
 
 
 LAB AND IMAGING FINDINGS:  
 
Lab Results Component Value Date/Time WBC 6.0 06/24/2019 03:09 AM  
 HGB 13.2 06/24/2019 03:09 AM  
 PLATELET 451 49/70/8606 03:09 AM  
 
Lab Results Component Value Date/Time Sodium 142 06/23/2019 09:28 AM  
 Potassium 3.6 06/23/2019 09:28 AM  
 Chloride 111 (H) 06/23/2019 09:28 AM  
 CO2 20 (L) 06/23/2019 09:28 AM  
 BUN 19 06/23/2019 09:28 AM  
 Creatinine 1.31 (H) 06/23/2019 09:28 AM  
 Calcium 10.5 (H) 06/23/2019 09:28 AM  
 Magnesium 2.3 03/02/2019 04:06 AM  
 Phosphorus 2.1 (L) 04/21/2016 07:06 PM  
  
Lab Results Component Value Date/Time AST (SGOT) 18 06/23/2019 09:28 AM  
 Alk. phosphatase 114 06/23/2019 09:28 AM  
 Protein, total 8.6 (H) 06/23/2019 09:28 AM  
 Albumin 3.2 (L) 06/23/2019 09:28 AM  
 Globulin 5.4 (H) 06/23/2019 09:28 AM  
 
Lab Results Component Value Date/Time INR 1.1 06/23/2019 09:28 AM  
 Prothrombin time 11.5 (H) 06/23/2019 09:28 AM  
 aPTT 26.5 06/23/2019 09:28 AM  
  
Lab Results Component Value Date/Time Iron 47 03/03/2019 06:15 AM  
 TIBC 203 (L) 03/03/2019 06:15 AM  
 Iron % saturation 23 03/03/2019 06:15 AM  
  
No results found for: PH, PCO2, PO2 No components found for: [place] Lab Results Component Value Date/Time CK 43 12/13/2018 11:31 AM  
  
 
 
   
 
Total time: 70 minutes Counseling / coordination time, spent as noted above: 50 minutes 
> 50% counseling / coordination?: y 
 
Prolonged service was provided for  []30 min   []75 min in face to face time in the presence of the patient, spent as noted above. Time Start: Time End:  
Note: this can only be billed with 48505 (initial) or 08167 (follow up). If multiple start / stop times, list each separately.

## 2019-06-24 NOTE — PROGRESS NOTES
0730: Bedside shift change report given to Malcolmdiamond Oropeza (oncoming nurse) by Jp RN (offgoing nurse). Report included the following information SBAR and Kardex. 1930: Bedside shift change report given to Carina Winston (oncoming nurse) by Ian Benitez RN (offgoing nurse). Report included the following information SBAR and Kardex. Problem: Falls - Risk of 
Goal: *Absence of Falls Description Document Helenajavid Wilkins Fall Risk and appropriate interventions in the flowsheet. Outcome: Progressing Towards Goal 
  
Problem: Patient Education: Go to Patient Education Activity Goal: Patient/Family Education Outcome: Progressing Towards Goal 
  
Problem: Pressure Injury - Risk of 
Goal: *Prevention of pressure injury Description Document Jayro Scale and appropriate interventions in the flowsheet.  
Outcome: Progressing Towards Goal

## 2019-06-24 NOTE — PROGRESS NOTES
Speech pathology note Reviewed chart and discussed case with RN and daughter present at bedside. Daughter reported patient vomited yesterday and early this morning, and he has not eaten today. Daughter reported that patient has been drowsy and sleeping all day. Daughter requested to defer swallowing evaluation at this time due to drowsiness and vomiting. Will follow up tomorrow for swallowing evaluation. Thank you. Iris Rose., CCC-SLP

## 2019-06-24 NOTE — PROGRESS NOTES
[address] [facility] met with patient's daughter Jason Mcintyre as patient was sleeping  today to provide an introduction to self, the [address] at [facility]. Bundled Payment Care Program: 
 
Patient's daughter  was provided a copy of the [facility] letter. Patient's daughter  states that she is not sure if patient has a scale, if not they will purchase one a functioning scale at home. Patient was not provided a scale during this visit. Reinforced the importance of checking their weight at the same time daily and calling the cardiologist if their weight is up 3 lbs. in a day or 5 lbs. in a week (or per MD guidelines). Patient  has received a \"Living with Heart Failure\" patient education book. Hug At Home Program: 
 
Demonstration of Hug at [facility] program deferred as patient disposition to be determined. All questions answered at this time. Patient verbalized understanding of all information discussed. Appointments:  
 
Patient stated best day(s) of the week for provider appointment(s): Monday, Tuesday Patient stated best time of day for provider appointment(s):  PM

## 2019-06-24 NOTE — PROGRESS NOTES
CM notes patient is a [employer]. Discharge Plan: 
Home with [address] [place] [address] or home health pending PT and OT evaluations. Patient's daughter agreeable to [address] [place] [address] visits for CHF diagnosis. CM sent referral for same. Unknown discharge date at time of assessment. Reason for Admission:   CHF 
            
RRAT Score:     23 Resources/supports as identified by patient/family:   CM spoke with patient's daughter at bedside. Family support and 24 hour private aide at home through [employer]. Top Challenges facing patient (as identified by patient/family and CM):  CM identifies patient medical complexity and mobility impairment as challenges. Finances/Medication cost?       
          Patient's daughter states no challenges. Patient has [employer] HMO supplement. Transportation? Patient does not drive. Patient transportation provided by family; however, patient predominately homebound. Support system or lack thereof? Supportive daughter, William aMta (p: [phone]) Living arrangements? Lives alone with 24 hour private duty aides. Self-care/ADLs/Cognition? Patient dependent on private duty aide for self care. Patient uses walker at baseline for ADLs. Patient asleep during CM assessment. Assessment taken place between patient's daughter, [address] [place] [address], and . Current Advanced Directive/Advance Care Plan:  Not on file. Plan for utilizing home health:    [address] [place] [address] Transition of Care Plan: 1. Home with [address] [place] [address] or home health pending PT and OT evaluations. Patient's daughter agreeable to [address] [place] [address] visits for CHF diagnosis. CM sent referral for same. Unknown discharge date at time of assessment. CM to continue to follow for needs. Jania Bernstein, MPH Care Management Interventions PCP Verified by CM:  Yes 
Palliative Care Criteria Met (RRAT>21 & CHF Dx)?: Yes 
 Palliative Consult Recommended?: Yes Mode of Transport at Discharge: Other (see comment)(TBD) Transition of Care Consult (CM Consult): Discharge Planning MyChart Signup: No 
Discharge Durable Medical Equipment: No 
Health Maintenance Reviewed: Yes Physical Therapy Consult: Yes Occupational Therapy Consult: Yes Speech Therapy Consult: Yes Current Support Network: Own Home, Has Personal Caregivers(24hr private aide through [employer]) Confirm Follow Up Transport: Family Plan discussed with Pt/Family/Caregiver: Yes Discharge Location Discharge Placement: (TBD)

## 2019-06-24 NOTE — PROGRESS NOTES
Renal Dosing/Monitoring Medication: Famotidine Indication:  GERD Current regimen:  20 mg every 12 hr 
Recent Labs  
  06/24/19 
0309 06/23/19 
3973 WBC 6.0 4.8 CREA  --  1.31* BUN  --  19  
 
Estimated CrCl:  45 ml/min Plan: Will change to 20 mg Q 24 hrs for crcl < 50 per renal adjustment protocol. Pharmacy to monitor patient daily for dosage adjustments as needed.

## 2019-06-24 NOTE — PROGRESS NOTES
1930: Bedside and Verbal shift change report given to Joo Madsen (oncoming nurse) by Jackie Pathak (offgoing nurse). Report included the following information SBAR, Kardex and ED Summary. 2300: Pt pulled out IV.

## 2019-06-24 NOTE — CARDIO/PULMONARY
Cardiac Rehab: Living with Heart Failure Booklet given to Alexander Good by [facility] staff. Met with several family members to provide education for HF. Alexander Good very hard of hearing and still with some AMS. Educated using teach back method. Discussed diagnosis definition and assessed patient understanding. Reviewed importance of daily weight monitoring and Low Sodium diet (0383-0448 mg. Daily). Sressed the importance of reading food labels and keeping a food log at home to track sodium. Encouraged activity and rest periods within symptom limitations and as ordered by physician. Discussed importance of reporting signs and symptoms of exacerbation, and when to report them to the doctor, to prevent re-hospitalization. Alexander Good was encouraged to keep all appointments with doctor. Villa Mcardle, RN Villa Limber could benefit from further education on the following HF topics: MEDICATIONS

## 2019-06-24 NOTE — PROGRESS NOTES
Spiritual Care Assessment/Progress Note [address] 
 
 
NAME: Barb Rodarte      MRN: [id number] AGE: 80 y.o. SEX: male Anabaptist Affiliation: Baptism  
Language: English  
 
6/24/2019     Total Time (in minutes): 10 Spiritual Assessment begun in [facility] CV SERVICES UNIT through conversation with: 
  
    []Patient        [x] Family    [] Friend(s) Reason for Consult: Palliative Care, Initial/Spiritual Assessment Spiritual beliefs: (Please include comment if needed) 
   [] Identifies with a benjy tradition:     
   [] Supported by a benjy community:        
   [] Claims no spiritual orientation:       
   [] Seeking spiritual identity:            
   [] Adheres to an individual form of spirituality:       
   [x] Not able to assess: pt resting Identified resources for coping:  
   [] Prayer                           
   [] Music                  [] Guided Imagery 
   [] Family/friends                 [] Pet visits [] Devotional reading                         [x] Unknown 
   [] Other Interventions offered during this visit: (See comments for more details) Patient Interventions: Initial visit Family/Friend(s): Initial Assessment Plan of Care: 
 
 [] Support spiritual and/or cultural needs  
 [] Support AMD and/or advance care planning process    
 [] Support grieving process 
 [] Coordinate Rites and/or Rituals  
 [] Coordination with community clergy [] No spiritual needs identified at this time 
 [] Detailed Plan of Care below (See Comments)  [] Make referral to Music Therapy 
[] Make referral to Pet Therapy    
[] Make referral to Addiction services 
[] Make referral to [facility] 
[] Make referral to Spiritual Care Partner 
[] No future visits requested       
[x] Follow up visits as needed Comments: Visited Mr. Valeria Alfaro for initial spiritual assessment.  Pt was resting with several family members at bedside. Visit was brief at this time as to not disturb patient. No specific needs expressed by family at this time. Assured them of  availability. Darby Villareal, Palliative

## 2019-06-24 NOTE — PROGRESS NOTES
Problem: Self Care Deficits Care Plan (Adult) Goal: *Acute Goals and Plan of Care (Insert Text) Description Occupational Therapy Goals Initiated 6/24/2019 1. Patient will perform self-feeding sitting unsupported with minimal assistance/contact guard assist within 7 day(s). 2.  Patient will perform anterior upper body bathing with minimal assistance/contact guard assist within 7 day(s). 3.  Patient will perform lower body ADLs with moderate assistance  within 7 day(s). 4.  Patient will perform toilet transfers with minimal assistance within 7 day(s). 5.  Patient will perform all aspects of toileting with minimal assistance/contact guard assist within 7 day(s). 6.  Patient will participate in upper extremity therapeutic exercise/activities with minimal assistance/contact guard assist for 5 minutes within 7 day(s). 7.  Patient will utilize energy conservation techniques during functional activities with verbal cues within 7 day(s). Outcome: Progressing Towards Goal 
 
OCCUPATIONAL THERAPY EVALUATION Patient: Michelle Aguilar (70 y.o. male) Date: 6/24/2019 Primary Diagnosis: [facility] [I50.9] Precautions:  Fall ASSESSMENT : 
Based on the objective data described below, the patient presents with overall mod-max A for bed mobility and max-total A for ADLs s/p admission for CHF. Family at bedside and assisted with answering background questions 2* patient very [place] and lethargic. Patient ADLs limited by impaired sitting balance, decreased functional activity tolerance, generalized weakness, and decreased alertness. Patient has 24/7 supervision/assist at baseline and has assist for ADLs from caregivers/family. Patient would benefit from acute rehab and pending progress and medical course, may benefit from short SNF stay. Will continue to assess.   
 
Recommend with nursing patient to complete as able in order to maintain strength, endurance and independence: ADLs with supervision/setup, bed to chair position 3x/day and mobilizing self in bed in prep for toileting with 2 assist. Thank you for your assistance. Patient will benefit from skilled intervention to address the above impairments. Patient?s rehabilitation potential is considered to be Fair Factors which may influence rehabilitation potential include: ? None noted ? Mental ability/status ? Medical condition ? Home/family situation and support systems ? Safety awareness ? Pain tolerance/management ? Other: PLAN : 
Recommendations and Planned Interventions: 
?               Self Care Training                  ? Therapeutic Activities ? Functional Mobility Training    ? Cognitive Retraining 
? Therapeutic Exercises           ? Endurance Activities ? Balance Training                   ? Neuromuscular Re-Education ? Visual/Perceptual Training     ? Home Safety Training 
? Patient Education                 ? Family Training/Education ? Other (comment): Frequency/Duration: Patient will be followed by occupational therapy 3 times a week to address goals. Discharge Recommendations: Home Health OT or [place] pending progress Further Equipment Recommendations for Discharge: TBD - likely none SUBJECTIVE:  
Patient stated ?mmhmm. ? OBJECTIVE DATA SUMMARY:  
HISTORY:  
Past Medical History:  
Diagnosis Date Cancer [facility])   
 prostate DJD (degenerative joint disease) Elevated cholesterol GERD (gastroesophageal reflux disease) Hyperparathyroidism, primary ([facility] Hypertension Obstructive sleep apnea   
 uses cpap Thyroid disease HX of Graves Disease Past Surgical History:  
Procedure Laterality Date HX HEENT    
 thyroidectomy HX HEENT    
 tonsilectomy [facility] ORTHOPAEDIC    
 right femoral neck fracture HX ORTHOPAEDIC    
 bilateral ankle surgery HX ORTHOPAEDIC  12/12/12 LEFT ANKLE REFUSION AND REMOVAL POSTERIOR SCREW  
 MA COLONOSCOPY FLX DX W/COLLJ SPEC WHEN PFRMD  12/11/2006 Dr Coates Laughter  repeat 12/2011 MA ICAR CATHETER ABLATION ATRIOVENTR NODE FUNCTION N/A 2/25/2019 ABLATION AV NODE performed by Farzaneh Montague MD at [address]  CATH LAB  
 MA PROSTATE BIOPSY, NEEDLE, SATURATION SAMPLING    
 MA TRANSCATH INSERT OR REPLACE LEADLESS PM VENTR N/A 2/25/2019 INSERT OR REPLACE TRANSCATH PPM LEADLESS performed by Farzaneh Montague MD at [address]  CATH LAB Prior Level of Function/Environment/Context: Patient lives in 2 level home with daughter (lives on 1st floor) and has assist for dressing, bathing, toileting and grooming at baseline. 24/7 supervision/assist at baseline. Has all needed DME Home Situation Home Environment: Private residence # Steps to Enter: 0 One/Two Story Residence: Two story, live on 1st floor Living Alone: No 
Support Systems: Family member(s) Current DME Used/Available at Home: Belita Gallop, rollator, Commode, bedside, Shower chair Tub or Shower Type: Shower Hand dominance: Right EXAMINATION OF PERFORMANCE DEFICITS: 
Cognitive/Behavioral Status: 
Neurologic State: Alert Orientation Level: Oriented to person;Oriented to place; Disoriented to situation;Disoriented to time(A+O x2 at baseline) Cognition: Appropriate for age attention/concentration; Follows commands Perception: Appears intact Perseveration: No perseveration noted Safety/Judgement: Awareness of environment; Fall prevention Skin: Appears grossly intact Edema: none noted in BUEs Hearing: Auditory Auditory Impairment: None Vision/Perceptual:   
Tracking: Able to track stimulus in all quadrants w/o difficulty Diplopia: No   
Acuity: Within Defined Limits Corrective Lenses: Reading glasses Range of Motion: [place] [address] [facility] [address] AROM: Generally decreased, functional 
  
Strength: [place] [address] [facility] [address] Strength: Generally decreased, functional 
Coordination: 
Coordination: Generally decreased, functional 
Fine Motor Skills-Upper: Left Intact; Right Intact Gross Motor Skills-Upper: Left Intact; Right Intact Tone & Sensation: [place] [address] Tone: Normal 
Sensation: Intact Balance: 
Sitting: Impaired Sitting - Static: Fair (occasional) Sitting - Dynamic: Fair (occasional) Standing: (NT 2* unsteady sitting balance and lethargy) Functional Mobility and Transfers for ADLs: 
Bed Mobility: 
Supine to Sit: Moderate assistance; Additional time;Assist x1 Sit to Supine: Moderate assistance; Additional time;Assist x1 Scooting: Maximum assistance; Additional time;Assist x1 Transfers: 
Sit to Stand: (NT 2* impaired sitting balance and lethargy) ADL Assessment: 
Feeding: Maximum assistance(observed family feeding patient) Oral Facial Hygiene/Grooming: Maximum assistance(patient mod A at baseline per family) Bathing: Total assistance(patient mod-max A at baseline per family) Upper Body Dressing: Total assistance(mod-max A at baseline per family) Lower Body Dressing: Total assistance(mod-max A at baseline per family) Toileting: Total assistance(mod-max A at baseline per family) ADL Intervention and task modifications: 
Feeding Drink to Mouth: Total assistance (dependent) Grooming Washing Face: Maximum assistance Cognitive Retraining Safety/Judgement: Awareness of environment; Fall prevention Functional Measure: 
Barthel Index: 
Bathin Bladder: 5 Bowels: 5 Groomin Dressin Feedin Mobility: 0 Stairs: 0 Toilet Use: 0 Transfer (Bed to Chair and Back): 0 Total: 20/100 Percentage of impairment  
0% 1-19% 20-39% 40-59% 60-79% 80-99% 100% Barthel Score 0-100 100 99-80 79-60 59-40 20-39 1-19 
 0 The Barthel ADL Index: Guidelines 1. The index should be used as a record of what a patient does, not as a record of what a patient could do. 2. The main aim is to establish degree of independence from any help, physical or verbal, however minor and for whatever reason. 3. The need for supervision renders the patient not independent. 4. A patient's performance should be established using the best available evidence. Asking the patient, friends/relatives and nurses are the usual sources, but direct observation and common sense are also important. However direct testing is not needed. 5. Usually the patient's performance over the preceding 24-48 hours is important, but occasionally longer periods will be relevant. 6. Middle categories imply that the patient supplies over 50 per cent of the effort. 7. Use of aids to be independent is allowed. Harley Houston., Barthel, D.W. (0678). Functional evaluation: the Barthel Index. [address] [facility] (14)2. GEORGIANA Sinclair, Mindi Mukherjee., Yan Cotton., Simpson, [address] [place] [address] (1999). Measuring the change indisability after inpatient rehabilitation; comparison of the responsiveness of the Barthel Index and Functional [place] Measure. Journal of Neurology, Neurosurgery, and Psychiatry, 66(4), 808-848. Dodie Gonzalez, N.J.A, MIRIAM Martinez.J.M, & Ervin Dixon M.A. (2004.) Assessment of post-stroke quality of life in cost-effectiveness studies: The usefulness of the Barthel Index and the EuroQoL-5D. [facility], 13, [phone] Occupational Therapy Evaluation Charge Determination History Examination Decision-Making LOW Complexity : Brief history review  LOW Complexity : 1-3 performance deficits relating to physical, cognitive , or psychosocial skils that result in activity limitations and / or participation restrictions  HIGH Complexity : Patient presents with comorbidities that affect occupational performance.  Signifigant modification of tasks or assistance (eg, physical or verbal) with assessment (s) is necessary to enable patient to complete evaluation Based on the above components, the patient evaluation is determined to be of the following complexity level: MEDIUM Pain: 
Pain Scale 1: Numeric (0 - 10) Pain Intensity 1: 0 Activity Tolerance:  
Fair/poor, VS - see flowsheet Please refer to the flowsheet for vital signs taken during this treatment. After treatment:  
? Patient left in no apparent distress sitting up in chair ? Patient left in no apparent distress in bed 
? Call bell left within reach ? Nursing notified ? Caregiver present ? Bed alarm activated COMMUNICATION/EDUCATION:  
The patient?s plan of care was discussed with: Physical Therapist and Registered Nurse. 
? Home safety education was provided and the patient/caregiver indicated understanding. ? Patient/family have participated as able in goal setting and plan of care. ? Patient/family agree to work toward stated goals and plan of care. ? Patient understands intent and goals of therapy, but is neutral about his/her participation. ? Patient is unable to participate in goal setting and plan of care. This patient?s plan of care is appropriate for delegation to [facility]. Thank you for this referral. 
Mahin James OT Time Calculation: 25 mins

## 2019-06-24 NOTE — PROGRESS NOTES
Hospitalist Progress Note Evelio Jain MD 
     
                             Answering service: [phone] OR [phone] from in house phone Date of Service:  2019 NAME:  Edu Esquivel :  3/12/1931 MRN:  [id number] Admission Summary:  
 
80-year-old gentleman with a past medical history significant for primary hypertension, obstructive sleep apnea, atrial fibrillation and not on chronic anticoagulation due to concerns for falls, pacemaker placement, gastroesophageal reflux disease, dyslipidemia, degenerative joint disease, prostate cancer, and Graves disease was brought to the emergency room from home by EMS for complaints of altered mental status, shortness of breath, and lethargy. Per the patient's daughter at the bedside, the patient was at his baseline health up until the morning of emergency room presentation when the patient was noted to be slightly confused and unable to get out of bed. The patient also noted with worsening shortness of breath. Per the documentation, on EMS's arrival, the patient was noted with a blood sugar level of 876 mg/dL, systolic blood pressure of 170, and heart rate ranging from 90s to 120s. Per the patient's daughter, the patient without any history of recent falls or trauma; no complaints of headaches, visual deficits, chest pains, palpitations, nausea, vomiting, cough, fever, chills, abdominal pain, bladder or bowel irregularities. However, the patient did complain of some dizziness surrounding the acute events. Reason for follow up:  
  
CC: AMS, SOB, Lethargy Patient was not in any distress on am rounds. Per family at the bedside, patient with multiple episodes of vomitus since last night. Denied any worsening SOB. Assessment & Plan: 1) CNS: Acute probable multifactorial metabolic encephalopathy present on admission. CT head negative for acute findings. 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 Chronic Afib. Fairly well controlled Primary Hypertension. Dyslipidemia. EF 46-50 percent (2/22/19). S/P Pacemaker placement. Continue oxygen, IV diuresis, daily input/output, weight monitoring, CHF teaching. Current 2DECHO and Heart failure Team eval. Cardiology eval noted and appreciated. 3) Anticoagulation: [id number] score of at least 4 predisposing to an increased risk of thromboembolism. However, will hold off on chronic anticoagulation due to increased falls risk predisposing to bleeding diatheses. Risks/benefits D/W family. 4) GI: H/O GERD. Emesis. PPI and anti-emetics. As needed GI consult. 5) Renal: Probable dehydration. Close monitoring of BUN/Cr whilst being diuresed. 6) Oncology: H/O Prostate Cancer. 7) Endocrinology: H/O Graves Disease. H/O Thyroidectomy. TSH elevated at 22.90, normal FT4 1.1 Levothyroxine changed from 125 micrograms to 150 micrograms daily. Will need outpatient F/U TFTs with further dose adjustment. 8) Resp: H/O Obstructive Sleep Apnea. 9) Prophylaxis: DVT. 10) Directives: Full Code with an extremely guarded prognosis. Readdressed with patient and family at bedside. 11) Plan: Anticipate D/C to home in 3-5 days with probable HomeHealth nursing arrangements. D/W patient and patients daughters Giulia Bautista and Whit Coates. Hospital Problems  Date Reviewed: 6/23/2019 Codes Class Noted POA  
 [facility] ICD-10-CM: I50.9 ICD-9-CM: 428.0  6/23/2019 Yes Acute metabolic encephalopathy JET-26-KT: G93.41 
ICD-9-CM: 348.31  6/23/2019 Yes Debility ICD-10-CM: R53.81 ICD-9-CM: 799.3  6/23/2019 Yes Physical Examination:  
 
 Last 24hrs VS reviewed since prior progress note. Most recent are: 
Visit Vitals BP (!) 120/98 (BP 1 Location: Right arm, BP Patient Position: At rest;Sitting) Pulse 91 Temp 97.9 °F (36.6 °C) Resp 18 Ht 6' 2\" (1.88 m) Wt 90.7 kg (200 lb) SpO2 95% BMI 25.68 kg/m² Constitutional:  Chronically ill-appearing. HEENT: Head is a traumatic, Un icteric sclera. Pink conjunctiva,no erythema or discharge. Oral mucous moist, oropharynx benign. Neck supple, Resp:  Decreased air entry bilaterally. CV:  Regular rhythm, normal rate, no murmurs, gallops, rubs GI:  Soft, non distended, non tender. normoactive bowel sounds, no hepatosplenomegaly :  No CVA or suprapubic tenderness Skin  :  Skin tear LUE. Musculoskeletal:  Bipedal edema. Neurologic: Awake and alert Intake/Output Summary (Last 24 hours) at 6/24/2019 1134 Last data filed at 6/24/2019 9897 Gross per 24 hour Intake 25 ml Output 400 ml Net -375 ml Labs:  
 
Recent Labs  
  06/24/19 
0309 06/23/19 
9290 WBC 6.0 4.8 HGB 13.2 13.6 HCT 39.7 42.6  146* Recent Labs  
  06/23/19 
1702   
K 3.6 * CO2 20* BUN 19  
CREA 1.31* * CA 10.5* Recent Labs  
  06/23/19 
8024 SGOT 18 ALT 18  
 TBILI 0.9 TP 8.6* ALB 3.2*  
GLOB 5.4* Recent Labs  
  06/23/19 
4905 INR 1.1 PTP 11.5* APTT 26.5 No results for input(s): FE, TIBC, PSAT, FERR in the last 72 hours. Lab Results Component Value Date/Time Folate 19.3 03/03/2019 06:15 AM  
  
No results for input(s): PH, PCO2, PO2 in the last 72 hours. Recent Labs  
  06/23/19 
1834 06/23/19 
8403 TROIQ <0.05 <0.05 Lab Results Component Value Date/Time Cholesterol, total 138 06/23/2019 09:34 AM  
 HDL Cholesterol 42 06/23/2019 09:34 AM  
 LDL, calculated 81.4 06/23/2019 09:34 AM  
 Triglyceride 73 06/23/2019 09:34 AM  
 CHOL/HDL Ratio 3.3 06/23/2019 09:34 AM  
 
Lab Results Component Value Date/Time  Glucose (POC) 128 (H) 06/23/2019 09:28 AM  
 Glucose (POC) 119 (H) 02/26/2019 04:34 PM  
 Glucose (POC) 105 (H) 02/23/2019 06:29 AM  
 Glucose (POC) 97 12/12/2012 10:06 AM  
 
Lab Results Component Value Date/Time Color YELLOW/STRAW 06/23/2019 09:27 AM  
 Appearance CLEAR 06/23/2019 09:27 AM  
 Specific gravity 1.015 06/23/2019 09:27 AM  
 pH (UA) 5.5 06/23/2019 09:27 AM  
 Protein TRACE (A) 06/23/2019 09:27 AM  
 Glucose NEGATIVE  06/23/2019 09:27 AM  
 Ketone NEGATIVE  06/23/2019 09:27 AM  
 Bilirubin NEGATIVE  06/23/2019 09:27 AM  
 Urobilinogen 1.0 06/23/2019 09:27 AM  
 Nitrites NEGATIVE  06/23/2019 09:27 AM  
 Leukocyte Esterase NEGATIVE  06/23/2019 09:27 AM  
 Epithelial cells FEW 06/23/2019 09:27 AM  
 Bacteria NEGATIVE  06/23/2019 09:27 AM  
 WBC 0-4 06/23/2019 09:27 AM  
 RBC 0-5 06/23/2019 09:27 AM  
 
 
 
Medications Reviewed:  
 
Current Facility-Administered Medications Medication Dose Route Frequency  sodium chloride (NS) flush  famotidine (PF) (PEPCID) 20 mg in sodium chloride 0.9% 10 mL injection  20 mg IntraVENous Q12H  
 ondansetron (ZOFRAN) injection 4 mg  4 mg IntraVENous Q6H PRN  
 acetaminophen (TYLENOL) tablet 650 mg  650 mg Oral Q4H PRN  
 furosemide (LASIX) injection 40 mg  40 mg IntraVENous Q12H  
 docusate sodium (COLACE) capsule 100 mg  100 mg Oral PRN  
 heparin (porcine) injection 5,000 Units  5,000 Units SubCUTAneous Q12H  carvedilol (COREG) tablet 6.25 mg  6.25 mg Oral BID WITH MEALS  
 aspirin delayed-release tablet 81 mg  81 mg Oral DAILY  bisacodyl (DULCOLAX) tablet 5 mg  5 mg Oral PRN  cholecalciferol (VITAMIN D3) tablet 1,000 Units  1,000 Units Oral DAILY  pravastatin (PRAVACHOL) tablet 40 mg  40 mg Oral DAILY  levothyroxine (SYNTHROID) tablet 150 mcg  150 mcg Oral ACB  
 
______________________________________________________________________ EXPECTED LENGTH OF STAY: - - - 
ACTUAL LENGTH OF STAY:          1 Alexsandra Langley MD

## 2019-06-24 NOTE — WOUND CARE
WOCN Note:  
 
New consult placed for assessment of left forearm dskin tear. Dr Quoc Wade at the bedside for assessment. Chart reviewed. Admitted DX:  CHF (congestive heart failure) ([facility] [I50.9] Assessment:  
Patient is drowsy and requires assist of 1 with repositioning. Bed: total care Patient wearing briefs for incontinence. Patient reports no pain. Patient repositioned on right side with pillow. Sacrum, heels and buttocks intact without erythema. Old scarring with hyperpigmentation from former wound. 1.  POA Left forearm, skin tear : 1 x 0.2 x 0.1 cm  100% red. no exudate. Periwound without erythema. Recommendations: 1. Left forearm:  Every 3 days cleanse with saline; apply petralatum and mepilex border. Skin Care & Pressure Prevention: 
Minimize layers of linen/pads under patient to optimize support surface. Turn/reposition approximately every 2 hours and offload heels. Manage incontinence / promote continence Discussed above plan with Marjorie Krishnamurthy RN. Transition of Care: Plan to follow as needed while admitted to hospital. 
 
MADIE CastroN RN [facility] Wound Care Office [phone] Pager [phone]

## 2019-06-24 NOTE — NURSE NAVIGATOR
Chart reviewed by Heart Failure Nurse Navigator. Heart Failure database completed. EF:  Pending/  EF 46/50 2/22/19 ACEi/ARB/ARNi:Echo pending BB: Coreg Aldosterone Antagonist: echo pending Obstructive Sleep Apnea Screening: no 
 STOP-BANG score: 
 Referred to Sleep Medicine: CRT not indicated NYHA Functional Class requested via provider message on [employer] Heart Failure Teach Back in Patient Education. Heart Failure Avoiding Triggers on Discharge Instructions. Cardiologist: CAV Post discharge follow up phone call to be made within 48-72 hours of discharge.

## 2019-06-24 NOTE — PROGRESS NOTES
Cardiology Progress Note Assessment/Plan/Discussion:Cardiology Attending:  
 
Patient seen on the day of progress note and examined  and agree with Advance Practice Provider (TENZIN, NP,PA)  assessment and plans. Vinita Teran is a 80 y.o. male Seen in am and echo showed very poor EF and aortic stenosis The AS gradient may be low due to \"low output, low gradient\" AS He is very somnolent this am 
He has minimal edema and no rales, this is a forward output failure Discussed findings of congestive heart failure and tightened vavle with both daughters, pt somnolent We will treat medically, this gives a poor outlook Lab Results Component Value Date/Time TSH 22.90 (H) 06/23/2019 09:34 AM  
 he is hypothyroid, lets improve that , see if helps EF Stop lasix , we need bp room for coreg Following , thanks Yinka Puckett MD   
 
 
 
  
Admit Date: 6/23/2019 Admit Diagnosis: CHF (congestive heart failure) ([facility] [address] [I50.9] Date: 6/24/2019     Time: 10:51 AM 
 
HPI: presented with severe diaphoresis. Resolved per nursing with oxygen. Later given bumex. Testing shows elevated pro BNP Pt unable to provide any history, denies current chest pain or shortness of breath. Triage note indicated daughter attempted to get pt out of bed and was unable to do so. Typically able to get up and respond appropriately. He is pale and diaphoretic. Has PMH HTN, CLINT, GERD, Graves, unexplained orthostatic hypotension put on midodrine. Unable to control Afib rate and had AV sole ablation 2/25/19, leadless pacer. Missed f/u appointment 6/13/19- daughter said she didn't know about appointment. Scalp lac from fall 5/14/19. Pt lives with daughter. Subjective:  Pt is lethargic this a.m. But easily arousable.   Says he didn't sleep well- got lasix late yesterday, up voiding frequently last night.  Denies chest pain, SOB, dizziness, palpitations. Feels tired. Daughter said pt was diaphoretic, dizzy yesterday and SOB but did not c/o chest pain. Assessment and Plan 1. Episode of pallor and diaphoresis with AMS 
 -unclear source. Troponin nl x 2.  
 -Echo with EF  declined to 20%. .  
  
2. Cardiomyopathy, acute HFrEF: EF 20% 
 -Hx of cardiomyopathy-EF was slightly low (46-50% 2/2019) now EF 20% 
 - had no edema or rales to indicate CHF clinically but CXR with interstitial edema. 
 -ProBNP The ProBNP 7306, repeat echo pending. Enzymes negative x 2  
 -Reduce BB to 3.125 with hold parameter as BP low-soft 
 -start ace-I tomorrow as bp tolerates 
 -Would stop lasix for now- no edema noted. creatinine elevated- recheck labs 
  
3. Persistent AFib, with AVN ablation and pacer-stable rhythm 
 -currently afib/paced, rate 90's 
 -Coreg 6.25 mg BID- held this a.m. Due to hypotension.- reduce dose to 3.125 mg BID 
 -last interrogation 3/15/19 with lower rate 90. Missed f/u appointment with dr. Orly Blair 
 -TSH elevated but free T4 NL 
 -Not on [address] suspect d/t fall risk. Is on asa 81 mg daily 4. Moderate to severe as 
 -low c.o./EF also likely underestimating the gradient 
 -Treat medically for now. 
  
4. Hx orthostasis on midodrine at home BP low this a.m. And coreg (6.25 mg) was held. Better BP later this a.m. Reduce coreg as above. Hold midodrine 5. Hx of hypothyroidism- on synthroid' -TSH 23. Free T4 NL Pt seen by Dr. Tenzin Alexander- Pt with further reduction in EF, EF 20% and moderateto severe AS. Low ef contributes to underestimated aV gradient. Discussed findings with family. Pt was not very mobile, was debilitated prior to admission. GDMT for cardiomyopathy for now and see how pt progresses. Medical management of AS for now. Objective: 
  
 Physical Exam: 
             
Visit Vitals BP (!) 120/98 (BP 1 Location: Right arm, BP Patient Position:  At rest;Sitting) Comment (BP Patient Position): ~1 minute of sitting EOB Pulse 91 Temp 97.9 °F (36.6 °C) Resp 18 Ht 6' 2\" (1.88 m) Wt 200 lb (90.7 kg) SpO2 95% BMI 25.68 kg/m² General Appearance:   Well developed, appears fatigued, individual in no acute distress. Ears/Nose/Mouth/Throat:    Hearing grossly normal. 
  
    Neck:  Supple. Chest:    Lungs diminished bases otherwise clear. Cardiovascular:   Regular rate and rhythm, S1, S2 normal,grade II/VI systolic murmur Abdomen:    Soft, non-tender, bowel sounds are active. Extremities:  No edema bilaterally. Skin:  Warm and dry. Telemetry: v paced, afib Data Review:  
 Labs:   
Recent Results (from the past 24 hour(s)) TROPONIN I Collection Time: 06/23/19  6:34 PM  
Result Value Ref Range Troponin-I, Qt. <0.05 <0.05 ng/mL CBC WITH AUTOMATED DIFF Collection Time: 06/24/19  3:09 AM  
Result Value Ref Range WBC 6.0 4.1 - 11.1 K/uL  
 RBC 4.00 (L) 4.10 - 5.70 M/uL  
 HGB 13.2 12.1 - 17.0 g/dL HCT 39.7 36.6 - 50.3 % MCV 99.3 (H) 80.0 - 99.0 FL  
 MCH 33.0 26.0 - 34.0 PG  
 MCHC 33.2 30.0 - 36.5 g/dL  
 RDW 16.2 (H) 11.5 - 14.5 % PLATELET 558 098 - 338 K/uL MPV 12.6 8.9 - 12.9 FL  
 NRBC 0.0 0  WBC ABSOLUTE NRBC 0.00 0.00 - 0.01 K/uL NEUTROPHILS 76 (H) 32 - 75 % LYMPHOCYTES 18 12 - 49 % MONOCYTES 5 5 - 13 % EOSINOPHILS 0 0 - 7 % BASOPHILS 0 0 - 1 % IMMATURE GRANULOCYTES 1 (H) 0.0 - 0.5 % ABS. NEUTROPHILS 4.6 1.8 - 8.0 K/UL  
 ABS. LYMPHOCYTES 1.1 0.8 - 3.5 K/UL  
 ABS. MONOCYTES 0.3 0.0 - 1.0 K/UL  
 ABS. EOSINOPHILS 0.0 0.0 - 0.4 K/UL  
 ABS. BASOPHILS 0.0 0.0 - 0.1 K/UL  
 ABS. IMM. GRANS. 0.0 0.00 - 0.04 K/UL  
 DF AUTOMATED    
EKG, 12 LEAD, INITIAL Collection Time: 06/24/19  7:26 AM  
Result Value Ref Range Ventricular Rate 93 BPM  
 Atrial Rate 93 BPM  
 QRS Duration 188 ms Q-T Interval 460 ms QTC Calculation (Bezet) 571 ms Calculated R Axis 39 degrees Calculated T Axis 177 degrees Diagnosis Undetermined rhythm Left bundle branch block When compared with ECG of 23-JUN-2019 09:20, 
Current undetermined rhythm precludes rhythm comparison, needs review Radiology:  
 
  
Current Facility-Administered Medications Medication Dose Route Frequency  sodium chloride (NS) flush  ondansetron (ZOFRAN) injection 4 mg  4 mg IntraVENous Q6H PRN  
 acetaminophen (TYLENOL) tablet 650 mg  650 mg Oral Q4H PRN  
 furosemide (LASIX) injection 40 mg  40 mg IntraVENous Q12H  
 docusate sodium (COLACE) capsule 100 mg  100 mg Oral PRN  
 heparin (porcine) injection 5,000 Units  5,000 Units SubCUTAneous Q12H  carvedilol (COREG) tablet 6.25 mg  6.25 mg Oral BID WITH MEALS  
 aspirin delayed-release tablet 81 mg  81 mg Oral DAILY  bisacodyl (DULCOLAX) tablet 5 mg  5 mg Oral PRN  cholecalciferol (VITAMIN D3) tablet 1,000 Units  1,000 Units Oral DAILY  pravastatin (PRAVACHOL) tablet 40 mg  40 mg Oral DAILY  levothyroxine (SYNTHROID) tablet 150 mcg  150 mcg Oral ACB Danette Anders. [facility] [address], [address] 
 [phone]

## 2019-06-25 NOTE — PROGRESS NOTES
CM updated by treatment team (hospitalist and PT evaluation) that patient to be discharged to SNF. CM provided SNF list to patient's daughter. Patient's daughter to discuss choice with her sister and will have decision for CM by tomorrow AM. CM left phone number on patient's board in room for family to call for choice so referral can be sent to SNF. Patient likely to discharge on 6/26/19.  
 
Maci Chavez, MPH

## 2019-06-25 NOTE — PROGRESS NOTES
Hospitalist Progress Note Brisa Otto MD 
     
                             Answering service: [phone] or [phone] from in house phone Date of Service:  2019 NAME:  Lisseth Brizuela :  3/12/1931 MRN:  [id number] Admission Summary:  
 
43-year-old gentleman with a past medical history significant for primary hypertension, obstructive sleep apnea, atrial fibrillation and not on chronic anticoagulation due to concerns for falls, pacemaker placement, gastroesophageal reflux disease, dyslipidemia, degenerative joint disease, prostate cancer, and Graves disease was brought to the emergency room from home by EMS for complaints of altered mental status, shortness of breath, and lethargy. Per the patient's daughter at the bedside, the patient was at his baseline health up until the morning of emergency room presentation when the patient was noted to be slightly confused and unable to get out of bed. The patient also noted with worsening shortness of breath. Per the documentation, on EMS's arrival, the patient was noted with a blood sugar level of 573 mg/dL, systolic blood pressure of 170, and heart rate ranging from 90s to 120s. Per the patient's daughter, the patient without any history of recent falls or trauma; no complaints of headaches, visual deficits, chest pains, palpitations, nausea, vomiting, cough, fever, chills, abdominal pain, bladder or bowel irregularities. However, the patient did complain of some dizziness surrounding the acute events. Reason for follow up:  
  
CC: AMS, SOB, Lethargy Patient was not in any distress on am rounds. Per family at the bedside, He is showing some ataxia with his left arm but no real weakness Checking cortisol and ammonia levels Assessment & Plan: 1) CNS: Acute encephalopathy- NOS present on admission. CT head negative for acute findings. Physical exam suspicious for acute CVA- he is already on aspirin and statin- unable to get MRI due to pacemaker OT/PT eval- CM for SNF placement 2) CVS: Acute probable combined Systolic and diastolic CHF. Chronic Afib. Fairly well controlled Primary Hypertension. Dyslipidemia. EF 46-50 percent (2/22/19). S/P Pacemaker placement. Continue oxygen, IV diuresis, daily input/output, weight monitoring, CHF teaching. Current 2DECHO and Heart failure Team eval. Cardiology eval noted and appreciated. 3) Anticoagulation: [id number] score of at least 4 predisposing to an increased risk of thromboembolism. However, will hold off on chronic anticoagulation due to increased falls risk predisposing to bleeding diatheses. Risks/benefits D/W family. 4) GI: H/O GERD. Emesis. PPI and anti-emetics. monitor 5) Renal: Probable dehydration. Start fluids due to hypotension and elevated creatinine 6) Oncology: H/O Prostate Cancer. 7) Endocrinology: H/O Graves Disease. H/O Thyroidectomy. TSH elevated at 22.90, normal FT4 1.1 Levothyroxine changed from 125 micrograms to 150 micrograms daily. Will need outpatient F/U TFTs with further dose adjustment. 8) Resp: H/O Obstructive Sleep Apnea. - he desats even with oxygen via NC 9) Prophylaxis: DVT. 10) Directives: Full Code with an extremely guarded prognosis. Readdressed with patient and family at bedside. 11) Plan: Anticipate D/C to SNF tomorrow- checking ammonia and cortisol levels Hospital Problems  Date Reviewed: 6/23/2019 Codes Class Noted POA  
 CHF (congestive heart failure) ([facility] [address] ICD-10-CM: I50.9 ICD-9-CM: 428.0  6/23/2019 Yes Acute metabolic encephalopathy PBN-[id number]: G93.41 
ICD-9-CM: 348.31  6/23/2019 Yes Debility ICD-10-CM: R53.81 ICD-9-CM: 799.3  6/23/2019 Yes Physical Examination: Last 24hrs VS reviewed since prior progress note. Most recent are: 
Visit Vitals /85 (BP 1 Location: Right arm, BP Patient Position: At rest) Pulse (P) 92 Temp (P) 98.1 °F (36.7 °C) Resp (P) 14 Ht 6' 2\" (1.88 m) Wt 91 kg (200 lb 9.9 oz) SpO2 (P) 95% BMI 25.76 kg/m² Constitutional:  Chronically ill-appearing. HEENT: Head is a traumatic, Un icteric sclera. Pink conjunctiva,no erythema or discharge. Oral mucous moist, oropharynx benign. Neck supple, Resp:  Decreased air entry bilaterally. CV:  Regular rhythm, normal rate, no murmurs, gallops, rubs GI:  Soft, non distended, non tender. normoactive bowel sounds, no hepatosplenomegaly :  No CVA or suprapubic tenderness Skin  :  Skin tear LUE. Musculoskeletal:  Bipedal edema. Neurologic: Lethargic, choreform movements in left arm Intake/Output Summary (Last 24 hours) at 6/25/2019 1728 Last data filed at 6/25/2019 1105 Gross per 24 hour Intake 340 ml Output 0 ml Net 340 ml Labs:  
 
Recent Labs  
  06/25/19 
0400 06/24/19 
0309 WBC 4.8 6.0 HGB 13.0 13.2 HCT 40.5 39.7 * 263 Recent Labs  
  06/25/19 
0400 06/23/19 
4911  142  
K 3.6 3.6 * 111* CO2 23 20* BUN 27* 19  
CREA 1.45* 1.31* * 126* CA 10.3* 10.5* Recent Labs  
  06/23/19 
9428 SGOT 18 ALT 18  
 TBILI 0.9 TP 8.6* ALB 3.2*  
GLOB 5.4* Recent Labs  
  06/23/19 
3964 INR 1.1 PTP 11.5* APTT 26.5 No results for input(s): FE, TIBC, PSAT, FERR in the last 72 hours. Lab Results Component Value Date/Time Folate 19.3 03/03/2019 06:15 AM  
  
No results for input(s): PH, PCO2, PO2 in the last 72 hours. Recent Labs  
  06/23/19 
1834 06/23/19 
5833 TROIQ <0.05 <0.05 Lab Results Component Value Date/Time  Cholesterol, total 138 06/23/2019 09:34 AM  
 HDL Cholesterol 42 06/23/2019 09:34 AM  
 LDL, calculated 81.4 06/23/2019 09:34 AM  
 Triglyceride 73 06/23/2019 09:34 AM  
 CHOL/HDL Ratio 3.3 06/23/2019 09:34 AM  
 
Lab Results Component Value Date/Time Glucose (POC) 125 (H) 06/24/2019 06:12 PM  
 Glucose (POC) 128 (H) 06/23/2019 09:28 AM  
 Glucose (POC) 119 (H) 02/26/2019 04:34 PM  
 Glucose (POC) 105 (H) 02/23/2019 06:29 AM  
 Glucose (POC) 97 12/12/2012 10:06 AM  
 
Lab Results Component Value Date/Time Color YELLOW/STRAW 06/23/2019 09:27 AM  
 Appearance CLEAR 06/23/2019 09:27 AM  
 Specific gravity 1.015 06/23/2019 09:27 AM  
 pH (UA) 5.5 06/23/2019 09:27 AM  
 Protein TRACE (A) 06/23/2019 09:27 AM  
 Glucose NEGATIVE  06/23/2019 09:27 AM  
 Ketone NEGATIVE  06/23/2019 09:27 AM  
 Bilirubin NEGATIVE  06/23/2019 09:27 AM  
 Urobilinogen 1.0 06/23/2019 09:27 AM  
 Nitrites NEGATIVE  06/23/2019 09:27 AM  
 Leukocyte Esterase NEGATIVE  06/23/2019 09:27 AM  
 Epithelial cells FEW 06/23/2019 09:27 AM  
 Bacteria NEGATIVE  06/23/2019 09:27 AM  
 WBC 0-4 06/23/2019 09:27 AM  
 RBC 0-5 06/23/2019 09:27 AM  
 
 
 
Medications Reviewed:  
 
Current Facility-Administered Medications Medication Dose Route Frequency  carvedilol (COREG) tablet 6.25 mg  6.25 mg Oral BID WITH MEALS  dextrose 5% and 0.9% NaCl infusion  50 mL/hr IntraVENous CONTINUOUS  
 famotidine (PF) (PEPCID) 20 mg in sodium chloride 0.9% 10 mL injection  20 mg IntraVENous DAILY  ondansetron (ZOFRAN) injection 4 mg  4 mg IntraVENous Q6H PRN  
 acetaminophen (TYLENOL) tablet 650 mg  650 mg Oral Q4H PRN  
 docusate sodium (COLACE) capsule 100 mg  100 mg Oral PRN  
 heparin (porcine) injection 5,000 Units  5,000 Units SubCUTAneous Q12H  aspirin delayed-release tablet 81 mg  81 mg Oral DAILY  bisacodyl (DULCOLAX) tablet 5 mg  5 mg Oral PRN  cholecalciferol (VITAMIN D3) tablet 1,000 Units  1,000 Units Oral DAILY  pravastatin (PRAVACHOL) tablet 40 mg  40 mg Oral DAILY  levothyroxine (SYNTHROID) tablet 150 mcg  150 mcg Oral ACB ______________________________________________________________________ EXPECTED LENGTH OF STAY: 4d 2h 
ACTUAL LENGTH OF STAY:          2 Susan Hayes MD

## 2019-06-25 NOTE — PROGRESS NOTES
0730: Bedside shift change report given to Mariela Oropeza (oncoming nurse) by Nannette Viera (offgoing nurse). Report included the following information SBAR, Kardex and Cardiac Rhythm paced . 1230: hospitalist made are of families concern for patients left sided weakness. Had CT of head 2 days that was normal. Will continue to monitor. 1930: Bedside shift change report given to Nannette Viera (oncoming nurse) by Renee Song RN (offgoing nurse). Report included the following information SBAR and Kardex. Problem: Falls - Risk of 
Goal: *Absence of Falls Description Document Kaleb Reas Fall Risk and appropriate interventions in the flowsheet. Outcome: Progressing Towards Goal 
  
Problem: Patient Education: Go to Patient Education Activity Goal: Patient/Family Education Outcome: Progressing Towards Goal 
  
Problem: Pressure Injury - Risk of 
Goal: *Prevention of pressure injury Description Document Jayro Scale and appropriate interventions in the flowsheet.  
Outcome: Progressing Towards Goal

## 2019-06-25 NOTE — PROGRESS NOTES
1930: Bedside and Verbal shift change report given to Steven Levy RN (oncoming nurse) by Mine Thakur RN (offgoing nurse). Report included the following information SBAR, Kardex, Intake/Output, MAR, Recent Results and Cardiac Rhythm Paced. 0730: Bedside and Verbal shift change report given to , RN (oncoming nurse) by Steven Levy RN (offgoing nurse). Report included the following information SBAR, Kardex, Intake/Output, MAR, Recent Results and Cardiac Rhythm Paced.

## 2019-06-25 NOTE — PROGRESS NOTES
Palliative Medicine Consult Danilo: [phone]) Patient Name: Mar Moore YOB: 1931 Date of Initial Consult: June 24, 2019 Reason for Consult: Care Decisions Requesting Provider: Dr. Dawayne Fleischer Primary Care Physician: Alejandro Corado MD 
 
 SUMMARY:  
Mar Moore is a 80 y.o. with a past history of afib, PPM,HTN, CLINT, GERD, hypercholesterolemia, DJD, prostate cancer, [place], Graces disease, who was admitted on 6/23/2019 from home due to AMS,dizziness,shortness of breath, with a diagnosis of Metabolic encephalopathy, dehydration, elevated Bnp without overt CHF s/s of exacerbation. Head CT neg for acute issues. Current medical issues leading to Palliative Medicine involvement include: support with care decisions. Full Code status. Social:  since 2006, uses walker, fell March 2019 and has had 24 hour caregivers since, needs help with most ADL's, able to travel to pcp's office with assistance, 2 children, born in [place] but moved to [place] 60 years ago, retired from [employer] PALLIATIVE DIAGNOSES:  
1. Acute metabolic encephalopathy 2. Dehydration 3. Malaise 4. shortness of breath 5. debility PLAN:  
1. Pt seen without family present 2. Pt more alert today and interactive for brief periods 3. Initiated care goals discussion but pt lacks capacity at this time as he was unable to maintain alertness for a sufficient time period, unable to tell me his address, disoriented to place, hearing deficit 4. Willing to see again if his mentation improves. Will need family present to complete any legal documents so there is no question of the patient's ability if he proves capability prior to discharge. Initial encounter 6/24/19 (seen with both daughters) 5. Pt did not participate in the discussion as he was sleeping after physical therapy session and family still reports some confusion 6. Discussed patient's condition, care at home, concerns, options for care at home, pt wishes, ACP 7. The pt does not have an AMD.  Baseline he is able to make his own decisions 8. Family feels care needs are well met in the home. One daughter also lives in the home 9. Main concern is the patient's risk of falling 10. They are not interested or have a need for any of our outpatient programs 11. Family would appreciate a care goals discussion involving the patient. I have planned to f/u tomorrow to see if he is able 12. Family would prefer he make his own decisions regarding code status if possible as they have never discussed any of his wishes 13. Will follow up 14. Contact information provided 15. Initial consult note routed to primary continuity provider and/or primary health care team members 16. Communicated plan of care with: Maxwell Rodriguez 192 Team 
 
 GOALS OF CARE / TREATMENT PREFERENCES:  
 
GOALS OF CARE: 
Patient/Health Care Proxy Stated Goals: Prolong life TREATMENT PREFERENCES:  
Code Status: Full Code Advance Care Planning: 
[x] The [facility] Interdisciplinary Team has updated the ACP Navigator with Morenitastvandanaat 8 and Patient Capacity Primary Decision Maker: Fidelia Turner - [phone] Primary Decision Maker: Ashtyn Kyleighlaurel - [facility] - [phone] Advance Care Planning 6/24/2019 Patient's Healthcare Decision Maker is: - Confirm Advance Directive None Does the patient have other document types - Medical Interventions: Full interventions Other Instructions:  
Artificially Administered Nutrition: No feeding tube Other: As far as possible, the palliative care team has discussed with patient / health care proxy about goals of care / treatment preferences for patient. HISTORY:  
 
History obtained from:  Chart, family CHIEF COMPLAINT: pt admitted with aforementioned issues HPI/SUBJECTIVE: The patient is: [x] Verbal and participatory but limited  
[] Non-participatory due to:  
Denies pain. No shortness of breath Clinical Pain Assessment (nonverbal scale for severity on nonverbal patients):  
Clinical Pain Assessment Severity: 0 Activity (Movement): Lying quietly, normal position Duration: for how long has pt been experiencing pain (e.g., 2 days, 1 month, years) Frequency: how often pain is an issue (e.g., several times per day, once every few days, constant) FUNCTIONAL ASSESSMENT:  
 
Palliative Performance Scale (PPS): PPS: 40 PSYCHOSOCIAL/SPIRITUAL SCREENING:  
 
Palliative IDT has assessed this patient for cultural preferences / practices and a referral made as appropriate to needs (Cultural Services, Patient Advocacy, Ethics, etc.) Any spiritual / Advent concerns: 
[] Yes /  [x] No 
 
Caregiver Burnout: 
[] Yes /  [x] No /  [] No Caregiver Present Anticipatory grief assessment:  
[x] Normal  / [] Maladaptive ESAS Anxiety: Anxiety: 0 
 
ESAS Depression:    
 
 
 REVIEW OF SYSTEMS:  
 
Positive and pertinent negative findings in ROS are noted above in HPI. The following systems were [x] reviewed  [] unable to be reviewed as noted in HPI Other findings are noted below. Systems: constitutional, ears/nose/mouth/throat, respiratory, gastrointestinal, genitourinary, musculoskeletal, integumentary, neurologic, psychiatric, endocrine. Positive findings noted below. Modified ESAS Completed by: provider Fatigue: 4 Drowsiness: 1 Pain: 0 Anxiety: 0 Nausea: 0 Dyspnea: 0 Stool Occurrence(s): 1 PHYSICAL EXAM:  
 
From RN flowsheet: 
Wt Readings from Last 3 Encounters:  
06/25/19 200 lb 9.9 oz (91 kg) 03/01/19 220 lb 10.9 oz (100.1 kg) 12/13/18 230 lb (104.3 kg) Blood pressure 91/67, pulse 90, temperature 97.7 °F (36.5 °C), resp. rate 16, height 6' 2\" (1.88 m), weight 200 lb 9.9 oz (91 kg), SpO2 93 %. Pain Scale 1: Adult Nonverbal Pain Scale Pain Intensity 1: 0 Last bowel movement, if known:  
 
Constitutional: awake, alert, nad Eyes: anicterics ENMT: no nasal discharge, moist mucous membranes Cardiovascular: distal pulses intact Respiratory: breathing not labored on RA, symmetric Gastrointestinal: soft non-tender, +BS Musculoskeletal: no deformity, no tenderness to palpation Skin: warm, dry Neurologic: disoriented, moving all extremities, malaise Psychiatric:neg agitaiton Other: 
 
 
 HISTORY:  
 
Active Problems: 
  CHF (congestive heart failure) ([facility] [address] (6/23/2019) Acute metabolic encephalopathy (5/55/2328) Debility (6/23/2019) Past Medical History:  
Diagnosis Date  Cancer [facility])   
 prostate  DJD (degenerative joint disease)  Elevated cholesterol  GERD (gastroesophageal reflux disease)  Hyperparathyroidism, primary ([facility]  Hypertension  Obstructive sleep apnea   
 uses cpap  Thyroid disease HX of Graves Disease Past Surgical History:  
Procedure Laterality Date  HX HEENT    
 thyroidectomy  HX HEENT    
 tonsilectomy [address] [address]  HX ORTHOPAEDIC    
 right femoral neck fracture  HX ORTHOPAEDIC    
 bilateral ankle surgery  HX ORTHOPAEDIC  12/12/12 LEFT ANKLE REFUSION AND REMOVAL POSTERIOR SCREW  
 ND COLONOSCOPY FLX DX W/COLLJ SPEC WHEN PFRMD  12/11/2006 Dr Delgado Ellen  repeat 12/2011  ND ICAR CATHETER ABLATION ATRIOVENTR NODE FUNCTION N/A 2/25/2019 ABLATION AV NODE [place] [address] CATH LAB  ND PROSTATE BIOPSY, NEEDLE, SATURATION SAMPLING    
 ND TRANSCATH INSERT OR REPLACE LEADLESS PM VENTR N/A 2/25/2019 INSERT OR REPLACE TRANSCATH PPM LEADLESS performed by [place] [address], [address] CATH LAB Family History Problem Relation Age of Onset  Hypertension Mother  Heart Disease Father CAD  Heart Disease Brother History reviewed, no pertinent family history. Social History Tobacco Use  Smoking status: Never Smoker  Smokeless tobacco: Never Used Substance Use Topics  Alcohol use: Yes Comment: rare No Known Allergies Current Facility-Administered Medications Medication Dose Route Frequency  famotidine (PF) (PEPCID) 20 mg in sodium chloride 0.9% 10 mL injection  20 mg IntraVENous DAILY  carvedilol (COREG) tablet 3.125 mg  3.125 mg Oral BID WITH MEALS  ondansetron (ZOFRAN) injection 4 mg  4 mg IntraVENous Q6H PRN  
 acetaminophen (TYLENOL) tablet 650 mg  650 mg Oral Q4H PRN  
 docusate sodium (COLACE) capsule 100 mg  100 mg Oral PRN  
 heparin (porcine) injection 5,000 Units  5,000 Units SubCUTAneous Q12H  aspirin delayed-release tablet 81 mg  81 mg Oral DAILY  bisacodyl (DULCOLAX) tablet 5 mg  5 mg Oral PRN  cholecalciferol (VITAMIN D3) tablet 1,000 Units  1,000 Units Oral DAILY  pravastatin (PRAVACHOL) tablet 40 mg  40 mg Oral DAILY  levothyroxine (SYNTHROID) tablet 150 mcg  150 mcg Oral ACB  
 
 
 
 LAB AND IMAGING FINDINGS:  
 
Lab Results Component Value Date/Time WBC 4.8 06/25/2019 04:00 AM  
 HGB 13.0 06/25/2019 04:00 AM  
 PLATELET 314 (L) 42/81/4864 04:00 AM  
 
Lab Results Component Value Date/Time Sodium 143 06/25/2019 04:00 AM  
 Potassium 3.6 06/25/2019 04:00 AM  
 Chloride 112 (H) 06/25/2019 04:00 AM  
 CO2 23 06/25/2019 04:00 AM  
 BUN 27 (H) 06/25/2019 04:00 AM  
 Creatinine 1.45 (H) 06/25/2019 04:00 AM  
 Calcium 10.3 (H) 06/25/2019 04:00 AM  
 Magnesium 2.3 03/02/2019 04:06 AM  
 Phosphorus 2.1 (L) 04/21/2016 07:06 PM  
  
Lab Results Component Value Date/Time AST (SGOT) 18 06/23/2019 09:28 AM  
 Alk. phosphatase 114 06/23/2019 09:28 AM  
 Protein, total 8.6 (H) 06/23/2019 09:28 AM  
 Albumin 3.2 (L) 06/23/2019 09:28 AM  
 Globulin 5.4 (H) 06/23/2019 09:28 AM  
 
Lab Results Component Value Date/Time  INR 1.1 06/23/2019 09:28 AM  
 Prothrombin time 11.5 (H) 06/23/2019 09:28 AM  
 aPTT 26.5 06/23/2019 09:28 AM  
  
Lab Results Component Value Date/Time Iron 47 03/03/2019 06:15 AM  
 TIBC 203 (L) 03/03/2019 06:15 AM  
 Iron % saturation 23 03/03/2019 06:15 AM  
  
No results found for: PH, PCO2, PO2 No components found for: [place] Lab Results Component Value Date/Time CK 43 12/13/2018 11:31 AM  
  
 
 
   
 
Total time: 25 minutes Counseling / coordination time, spent as noted above: 20 minutes 
> 50% counseling / coordination?: y 
 
Prolonged service was provided for  []30 min   []75 min in face to face time in the presence of the patient, spent as noted above. Time Start:  
Time End:  
Note: this can only be billed with 73123 (initial) or 84304 (follow up). If multiple start / stop times, list each separately.

## 2019-06-25 NOTE — PROGRESS NOTES
Cardiology Progress Note Assessment/Plan/Discussion:Cardiology Attending:  
 
Patient seen on the day of progress note and examined  and agree with Advance Practice Provider (TENZIN, NP,PA)  assessment and plans. Jose Loyd is a 80 y.o. male Woke up for him and very alert Could state name, place,  and recognize family Denies any cp or sob EF is poor but no rales or edema cardiomyopathy without clinical CHF Will use coreg and diuretic as tolerated Not enough room on bp for ACE Lab Results Component Value Date/Time Creatinine (POC) 1.2 2019 09:28 AM  
 Creatinine 1.45 (H) 2019 04:00 AM  
 renal fx is fine AS is significant with likely low output, low gradient state Initial event of diaphoresis remains unexplained Anticipate conservative management of valve for now Corrine Gordon MD   
 
 
 
  
Admit Date: 2019 Admit Diagnosis: CHF (congestive heart failure) ([facility] [address] [I50.9] Date: 2019     Time: 10:51 AM 
 
HPI: presented with severe diaphoresis. Resolved per nursing with oxygen. Later given bumex. Testing shows elevated pro BNP Pt unable to provide any history, denies current chest pain or shortness of breath. Triage note indicated daughter attempted to get pt out of bed and was unable to do so. Typically able to get up and respond appropriately. He is pale and diaphoretic. Has PMH HTN, CLINT, GERD, Graves, unexplained orthostatic hypotension put on midodrine. Unable to control Afib rate and had AV sole ablation 19, leadless pacer. Missed f/u appointment 19- daughter said she didn't know about appointment. Scalp lac from fall 19. Pt lives with daughter. Subjective:  Pt is more awake this a.m. Says he slept well last night. Denies chest pain, dizziness, SOB. Daughter and granddaughter at bedside. Creatinine slightly up today but acceptable. Assessment and Plan 1. Cardiomyopathy, acute HFrEF: EF 20% -NYHA III 
 -Hx of cardiomyopathy-EF was slightly low (46-50% 2/2019) now EF 20% 
 -Appears compensated for now- lungs clear, no edema. 
 -Increase Coreg to 6.25 mg BID with hold parameter.(bp better) -Hold on starting ace-I d/t renal function for now. -Off diuretic for now. -Palliative care following 
 -H2H at discharge if discharged to home.  
  
3. Persistent AFib, with AVN ablation and pacer-stable rhythm 
 -currently afib/paced, rate 90's 
 -Coreg 6.25 mg BID 
 -Last interrogation 3/15/19 with lower rate 90. Missed f/u appointment with dr. Venu Ivan- follow up outpatient 
 -Not on [address], fall risk. Is on asa 81 mg daily 4. Moderate to severe AS 
 -Low output-low gradient 
 -Treat medically. 
  
4. Hx orthostasis on midodrine at home 
 -BP ok today. Off midodrine. 5. Hypothyroidism-  
 -TSH 23. Free T4 NL 
 -synthroid dose was increased this admission. 6. Elevated creatinine:  
 -creatinine slightly up 1.45 from 1.31 today but acceptable. Pt seen by Dr. Nabil Muñoz- more awake and interactive today. BP limits aggressive uptitration of medications but will increase coreg dosing today. Hold on ACE-I for now (renal function and BP). Recommend mobilize with PT/OT. D/w daughter, avoidance of aggressive/invasive cardiac testing/procedures given age, debility. Continue GDMT. Objective: 
  
 Physical Exam: 
             
Visit Vitals /85 (BP 1 Location: Right arm, BP Patient Position: At rest) Pulse 92 Temp 97.5 °F (36.4 °C) Resp 16 Ht 6' 2\" (1.88 m) Wt 200 lb 9.9 oz (91 kg) SpO2 98% BMI 25.76 kg/m² General Appearance:   Well developed,individual in no acute distress. More communicative today. Ears/Nose/Mouth/Throat:    Hearing grossly normal. 
  
    Neck:  Supple. Chest:    Lungs diminished bases otherwise clear.   
Cardiovascular:   Regular rate and rhythm, S1, S2 normal,grade II/VI systolic murmur Abdomen:    Soft, non-tender, bowel sounds are active. Extremities:  No edema bilaterally. Skin:  Warm and dry. Telemetry: v paced, afib Data Review:  
 Labs:   
Recent Results (from the past 24 hour(s)) GLUCOSE, POC Collection Time: 06/24/19  6:12 PM  
Result Value Ref Range Glucose (POC) 125 (H) 65 - 100 mg/dL Performed by Lon Pino METABOLIC PANEL, BASIC Collection Time: 06/25/19  4:00 AM  
Result Value Ref Range Sodium 143 136 - 145 mmol/L Potassium 3.6 3.5 - 5.1 mmol/L Chloride 112 (H) 97 - 108 mmol/L  
 CO2 23 21 - 32 mmol/L Anion gap 8 5 - 15 mmol/L Glucose 105 (H) 65 - 100 mg/dL BUN 27 (H) 6 - 20 MG/DL Creatinine 1.45 (H) 0.70 - 1.30 MG/DL  
 BUN/Creatinine ratio 19 12 - 20 GFR est AA 56 (L) >60 ml/min/1.73m2 GFR est non-AA 46 (L) >60 ml/min/1.73m2 Calcium 10.3 (H) 8.5 - 10.1 MG/DL  
CBC WITH AUTOMATED DIFF Collection Time: 06/25/19  4:00 AM  
Result Value Ref Range WBC 4.8 4.1 - 11.1 K/uL  
 RBC 3.98 (L) 4.10 - 5.70 M/uL  
 HGB 13.0 12.1 - 17.0 g/dL HCT 40.5 36.6 - 50.3 % .8 (H) 80.0 - 99.0 FL  
 MCH 32.7 26.0 - 34.0 PG  
 MCHC 32.1 30.0 - 36.5 g/dL  
 RDW 16.1 (H) 11.5 - 14.5 % PLATELET 883 (L) 347 - 400 K/uL MPV 12.1 8.9 - 12.9 FL  
 NRBC 0.0 0  WBC ABSOLUTE NRBC 0.00 0.00 - 0.01 K/uL NEUTROPHILS 66 32 - 75 % LYMPHOCYTES 27 12 - 49 % MONOCYTES 6 5 - 13 % EOSINOPHILS 0 0 - 7 % BASOPHILS 0 0 - 1 % IMMATURE GRANULOCYTES 1 (H) 0.0 - 0.5 % ABS. NEUTROPHILS 3.1 1.8 - 8.0 K/UL  
 ABS. LYMPHOCYTES 1.3 0.8 - 3.5 K/UL  
 ABS. MONOCYTES 0.3 0.0 - 1.0 K/UL  
 ABS. EOSINOPHILS 0.0 0.0 - 0.4 K/UL  
 ABS. BASOPHILS 0.0 0.0 - 0.1 K/UL  
 ABS. IMM. GRANS. 0.0 0.00 - 0.04 K/UL  
 DF AUTOMATED Radiology:  
 
  
Current Facility-Administered Medications Medication Dose Route Frequency  carvedilol (COREG) tablet 6.25 mg  6.25 mg Oral BID WITH MEALS  
  famotidine (PF) (PEPCID) 20 mg in sodium chloride 0.9% 10 mL injection  20 mg IntraVENous DAILY  ondansetron (ZOFRAN) injection 4 mg  4 mg IntraVENous Q6H PRN  
 acetaminophen (TYLENOL) tablet 650 mg  650 mg Oral Q4H PRN  
 docusate sodium (COLACE) capsule 100 mg  100 mg Oral PRN  
 heparin (porcine) injection 5,000 Units  5,000 Units SubCUTAneous Q12H  aspirin delayed-release tablet 81 mg  81 mg Oral DAILY  bisacodyl (DULCOLAX) tablet 5 mg  5 mg Oral PRN  cholecalciferol (VITAMIN D3) tablet 1,000 Units  1,000 Units Oral DAILY  pravastatin (PRAVACHOL) tablet 40 mg  40 mg Oral DAILY  levothyroxine (SYNTHROID) tablet 150 mcg  150 mcg Oral ACB Mandy Guido. [facility] [address] [place] [address], [id number] [facility] 
 [phone]

## 2019-06-25 NOTE — PROGRESS NOTES
Problem: Mobility Impaired (Adult and Pediatric) Goal: *Acute Goals and Plan of Care (Insert Text) Description Physical Therapy Goals Initiated 6/25/2019 1. Patient will move from supine to sit and sit to supine, scoot up and down, and roll side to side in bed with minimal assistance within 7 day(s). 2.  Patient will transfer from bed to chair and chair to bed with maximal assistance using the least restrictive device within 7 day(s). 3.  Patient will perform sit to stand with maximal assistance within 7 day(s). Outcome: Progressing Towards Goal 
  
PHYSICAL THERAPY EVALUATION Patient: Noel Rodriguez (46 y.o. male) Date: 6/25/2019 Primary Diagnosis: CHF (congestive heart failure) ([facility] [I50.9] Precautions: Fall, [place] 
 
ASSESSMENT : 
Based on the objective data described below, the patient presents with confusion + lethargy/decreased alertness, impaired static + dynamic sitting balance, impaired activity tolerance, impaired cardiopulmonary tolerance, L UE ataxia + trunk ataxia, and general debility following admission for CHF exacerbation. Overall, patient required moderate assist to attain EOB sitting, but required up to maximal assist for upright sitting posture at EOB (heaving trunk lean to the left; intermittent trunk ataxia observed). Patient's sitting tolerance ~ 8 minutes this date with feet approximated to floor. At this time, patient unable to safely relieve one hand from bed surface for functional reaching tasks while seated. With maximal assist, patient able to participate in x 4 squat standing trials for lateral scooting towards HOB. Prior to admission, patient's baseline: homebound, 24 hour care at home, short-distance ambulation with walker and physical assistance. At this time, unsure if family can provide the level of physical assistance patient needs at this time. If this is the case, recommend discharge to SNF. Recommend with nursing patient to complete as able in order to maintain strength, endurance and independence: ADLs with supervision/setup, bed to chair position 3x/day and mobilizing self in bed for toileting. Thank you for your assistance. Patient will benefit from skilled intervention to address the above impairments. Patients rehabilitation potential is considered to be Fair Factors which may influence rehabilitation potential include:  
? None noted ? Mental ability/status ? Medical condition ? Home/family situation and support systems ? Safety awareness 
? Pain tolerance/management 
? Other: PLAN : 
Recommendations and Planned Interventions: 
?           Bed Mobility Training             ? Neuromuscular Re-Education ? Transfer Training                   ? Orthotic/Prosthetic Training 
? Gait Training                         ? Modalities ? Therapeutic Exercises           ? Edema Management/Control ? Therapeutic Activities            ? Patient and Family Training/Education ? Other (comment): Frequency/Duration: Patient will be followed by physical therapy 3 times a week to address goals. Discharge Recommendations: SNF vs. Home with family + Support aides + HHPT Further Equipment Recommendations for Discharge: If discharges home, will need a gene lift (or A x 2 for commode transfers/wheelchair transfers), drop arm BSC, wheelchair SUBJECTIVE:  
Patient stated I'm Landry.  OBJECTIVE DATA SUMMARY:  
HISTORY:   
Past Medical History:  
Diagnosis Date  Cancer [facility])   
 prostate  DJD (degenerative joint disease)  Elevated cholesterol  GERD (gastroesophageal reflux disease)  Hyperparathyroidism, primary ([address]  Hypertension  Obstructive sleep apnea   
 uses cpap  Thyroid disease HX of Graves Disease Past Surgical History: Procedure Laterality Date  HX HEENT    
 thyroidectomy  HX HEENT    
 tonsilectomy [facility] [address]  HX ORTHOPAEDIC    
 right femoral neck fracture  HX ORTHOPAEDIC    
 bilateral ankle surgery  HX ORTHOPAEDIC  12/12/12 LEFT ANKLE REFUSION AND REMOVAL POSTERIOR SCREW  
 AL COLONOSCOPY FLX DX W/COLLJ SPEC WHEN PFRMD  12/11/2006 Dr Ender Mtz  repeat 12/2011  AL ICAR CATHETER ABLATION ATRIOVENTR NODE FUNCTION N/A 2/25/2019 ABLATION AV NODE performed by Mary Garland MD at [address] CATH LAB  AL PROSTATE BIOPSY, NEEDLE, SATURATION SAMPLING    
 AL TRANSCATH INSERT OR REPLACE LEADLESS PM VENTR N/A 2/25/2019 INSERT OR REPLACE TRANSCATH PPM LEADLESS performed by Mary Garland MD at [address]  CATH LAB Prior Level of Function/Home Situation: 24-hour care Personal factors and/or comorbidities impacting plan of care: [facility] Home Situation Home Environment: Private residence # Steps to Enter: 0 One/Two Story Residence: Two story, live on 1st floor Living Alone: No 
Support Systems: Child(lexi), Family member(s) Patient Expects to be Discharged to[de-identified] Private residence Current DME Used/Available at Home: Sharilyn Alia, rollator, Commode, bedside, Shower chair Tub or Shower Type: Shower EXAMINATION/PRESENTATION/DECISION MAKING:  
Critical Behavior: 
Neurologic State: Drowsy Orientation Level: Oriented to person, Oriented to place Cognition: Memory loss Safety/Judgement: Awareness of environment, Fall prevention Hearing: Auditory Auditory Impairment: None Range Of Motion: 
AROM: Generally decreased, functional 
 
Strength:   
Strength: Generally decreased, functional 
  
  
  
  
  
  
Tone & Sensation:  
Tone: Normal 
  
  
  
  
Sensation: Intact Coordination: 
Coordination: Generally decreased, functional(Some trunk ataxia?) Functional Mobility: 
Bed Mobility: 
Supine to Sit: Assist x1; Moderate assistance; Additional time Sit to Supine: Assist x1; Moderate assistance; Additional time Scooting: Assist x1;Maximum assistance Balance:  
Sitting: Impaired Sitting - Static: Fair (occasional); Prop sitting(L lateral lean; Max cues/facilitation to correct) Sitting - Dynamic: Poor (constant support) Standing: (Squat stands for lateral scooting up at EOB) Functional Measure: 
Barthel Index: 
 
Bathin Bladder: 5 Bowels: 5 Groomin Dressin Feedin Mobility: 0 Stairs: 0 Toilet Use: 0 Transfer (Bed to Chair and Back): 0 Total: 20/100 Percentage of impairment  
0% 1-19% 20-39% 40-59% 60-79% 80-99% 100% Barthel Score 0-100 100 99-80 79-60 59-40 20-39 1-19 
 0 The Barthel ADL Index: Guidelines 1. The index should be used as a record of what a patient does, not as a record of what a patient could do. 2. The main aim is to establish degree of independence from any help, physical or verbal, however minor and for whatever reason. 3. The need for supervision renders the patient not independent. 4. A patient's performance should be established using the best available evidence. Asking the patient, friends/relatives and nurses are the usual sources, but direct observation and common sense are also important. However direct testing is not needed. 5. Usually the patient's performance over the preceding 24-48 hours is important, but occasionally longer periods will be relevant. 6. Middle categories imply that the patient supplies over 50 per cent of the effort. 7. Use of aids to be independent is allowed. Aristeo Grewal., Barthel, D.W. (9557). Functional evaluation: the Barthel Index. [facility] (14)2. LIVIA WhiteF, Neetu Nugent., Jorden Saeed., Nain, [facility] (). Measuring the change indisability after inpatient rehabilitation; comparison of the responsiveness of the Barthel Index and Functional [place] Measure.  Journal of Neurology, Neurosurgery, and Psychiatry, 66(0), 709-010. MARTIN Gomez, NAVEEN Martinez, & Jose Juan Garcia M.A. (2004.) Assessment of post-stroke quality of life in cost-effectiveness studies: The usefulness of the Barthel Index and the EuroQoL-5D. [facility], 13, 523-41 Based on the above components, the patient evaluation is determined to be of the following complexity level: MEDIUM Pain: 
Pain Scale 1: Numeric (0 - 10) Pain Intensity 1: 0 Activity Tolerance: 
Please refer to the flowsheet for vital signs taken during this treatment. After treatment:  
?         Patient left in no apparent distress sitting up in chair ? Patient left in no apparent distress in bed 
? Call bell left within reach ? Nursing notified ? Caregiver present ? Bed alarm activated COMMUNICATION/EDUCATION:  
The patients plan of care was discussed with: Occupational Therapist, Registered Nurse and . ?         Fall prevention education was provided and the patient/caregiver indicated understanding. ? Patient/family have participated as able in goal setting and plan of care. ?         Patient/family agree to work toward stated goals and plan of care. ?         Patient understands intent and goals of therapy, but is neutral about his/her participation. ? Patient is unable to participate in goal setting and plan of care. Thank you for this referral. 
Gage Myers, PT, DPT Time Calculation: 19 mins

## 2019-06-25 NOTE — PROGRESS NOTES
As a member of the transitional care [facility] team, I saw Mr. Edith Bashir today. His daughter was at his bedside and we discussed trajectory of illness and encephalopathy. She shared that his baseline required 24 hour care at home and she knows that the care in place may not be adequate at discharge. We discussed that he may not achieve his baseline mental status and the difficulty of predicting that; she understood. We reviewed his current medications, labs and vital signs per her request. She expressed some difficulty following all of the different service lines during the admission and having the entire picture of her dad's prognosis. For example, she was not entirely sure she understood the role of palliative care so we dicussed their expertise in advanced care planning and symptom management. She seemed reassured. She expressed interest in a family conference and I advised that she could request this at any time. She is engaged in her dad's care and wants to be supportive. Our team will continue to follow.

## 2019-06-25 NOTE — PROGRESS NOTES
Problem: Dysphagia (Adult) Goal: *Acute Goals and Plan of Care (Insert Text) Description Initiated 6/25/2019 1. Patient will tolerate regular diet, thin liquids free of s/s of aspiration within 7 days. Outcome: Progressing Towards Goal 
 SPEECH LANGUAGE PATHOLOGY BEDSIDE SWALLOW EVALUATION Patient: Rafiq Mittal (44 y.o. male) Date: 6/25/2019 Primary Diagnosis: CHF (congestive heart failure) ([facility] [I50.9] Precautions:    Fall ASSESSMENT : 
Based on the objective data described below, the patient presents with mild oropharyngeal dysphagia likely related to LOC. He was drowsy throughout visit, but did rouse with cues. Responded to nickname \"Mimms\". Able to self-feed with some help for the cup of water due to new onset L UE weakness per family report. Oral swallow delayed but with functional clearance. Pharyngeal swallow with mild suspected delay. Throat clear with straw sips. Unable to reproduce this with cup sips. Patient refused solids. Lunch tray untouched at bedside. Patient will benefit from skilled intervention to address the above impairments. Patient?s rehabilitation potential is considered to be Good Factors which may influence rehabilitation potential include: ? None noted ? Mental ability/status ? Medical condition ? Home/family situation and support systems ? Safety awareness ? Pain tolerance/management ? Other: PLAN : 
Recommendations and Planned Interventions: 
Continue diet Upright for all PO Likely he will do better with cup sips, but may need some assistance with feeding. Frequency/Duration: Patient will be followed by speech-language pathology 3 times a week to address goals. Discharge Recommendations: To Be Determined SUBJECTIVE:  
Patient stated ? No,? when asked if he would eat anything else. OBJECTIVE:  
 
Past Medical History:  
Diagnosis Date Cancer ([facility] prostate DJD (degenerative joint disease) Elevated cholesterol GERD (gastroesophageal reflux disease) Hyperparathyroidism, primary ([facility] Hypertension Obstructive sleep apnea   
 uses cpap Thyroid disease HX of Graves Disease Past Surgical History:  
Procedure Laterality Date HX HEENT    
 thyroidectomy HX HEENT    
 [facility]    
 right femoral neck fracture HX ORTHOPAEDIC    
 bilateral ankle surgery HX ORTHOPAEDIC  12/12/12 LEFT ANKLE REFUSION AND REMOVAL POSTERIOR SCREW  
 NM COLONOSCOPY FLX DX W/COLLJ SPEC WHEN PFRMD  12/11/2006 Dr Gomez Flores  repeat 12/2011 NM ICAR CATHETER ABLATION ATRIOVENTR NODE FUNCTION N/A 2/25/2019 ABLATION AV NODE performed by Becki Plasencia MD at [address] CATH LAB  
 NM PROSTATE BIOPSY, NEEDLE, SATURATION SAMPLING    
 NM TRANSCATH INSERT OR REPLACE LEADLESS PM VENTR N/A 2/25/2019 INSERT OR REPLACE TRANSCATH PPM LEADLESS performed by Becki Plasencia MD at [address] CATH LAB Prior Level of Function/Home Situation:   
Home Situation Home Environment: Private residence # Steps to Enter: 0 One/Two Story Residence: Two story, live on 1st floor Living Alone: No 
Support Systems: Child(lexi), Family member(s) Patient Expects to be Discharged to[de-identified] Private residence Current DME Used/Available at Home: Ginger Hoit, rollator, Commode, bedside, Shower chair Tub or Shower Type: Shower Diet prior to admission: regular/thins Current Diet:  regular/thins Cognitive and Communication Status: 
Neurologic State: Drowsy Orientation Level: Unable to verbalize Cognition: Decreased attention/concentration, Decreased command following Perception: Appears intact Perseveration: No perseveration noted Safety/Judgement: Awareness of environment, Fall prevention Oral Assessment: 
Oral Assessment Labial: (difficult to assess, decreased command following. No obvious asymmetry) Dentition: (difficult to visualize, decreased oral opening. There are teeth in is mouth) Oral Hygiene: moist 
Lingual: No impairment Velum: Unable to visualize Mandible: No impairment P.O. Trials: 
Patient Position: upright in bed Vocal quality prior to P.O.: No impairment Consistency Presented: Thin liquid;Puree How Presented: Self-fed/presented;Straw;Spoon;Cup/sip(assistance with cup sips, L UE weakness per family) Bolus Acceptance: No impairment Bolus Formation/Control: No impairment Propulsion: No impairment Oral Residue: None Initiation of Swallow: No impairment Laryngeal Elevation: Functional 
Aspiration Signs/Symptoms: Clear throat(with straw sips only) Oral Phase Severity: No impairment Pharyngeal Phase Severity : Mild NOMS:  
The NOMS functional outcome measure was used to quantify this patient's level of swallowing impairment. Based on the NOMS, the patient was determined to be at level 5 for swallow function NOMS Swallowing Levels: 
Level 1 (CN): NPO Level 2 (CM): NPO but takes consistency in therapy Level 3 (CL): Takes less than 50% of nutrition p.o. and continues with nonoral feedings; and/or safe with mod cues; and/or max diet restriction Level 4 (CK): Safe swallow but needs mod cues; and/or mod diet restriction; and/or still requires some nonoral feeding/supplements Level 5 (CJ): Safe swallow with min diet restriction; and/or needs min cues Level 6 (CI): Independent with p.o.; rare cues; usually self cues; may need to avoid some foods or needs extra time Level 7 (CH): Independent for all p.o. GIANNA. (2003). National Outcomes Measurement System (NOMS): Adult Speech-Language Pathology User's Guide. Pain: 
Pain Scale 1: Numeric (0 - 10) Pain Intensity 1: 0 After treatment:  
?            Patient left in no apparent distress sitting up in chair ? Patient left in no apparent distress in bed ? Call bell left within reach ?            Nursing notified ? Caregiver present ? Bed alarm activated COMMUNICATION/EDUCATION:  
The patient?s plan of care including recommendations, planned interventions, and recommended diet changes were discussed with: Registered Nurse. Patient was educated regarding role of SLP. He was minimally communicative, but family understood. ? Posted safety precautions in patient's room. ? Patient/family have participated as able in goal setting and plan of care. ?            Patient/family agree to work toward stated goals and plan of care. ?            Patient understands intent and goals of therapy, but is neutral about his/her participation. ? Patient is unable to participate in goal setting and plan of care. Thank you for this referral. 
Trish Linares SLP Time Calculation: 15 mins

## 2019-06-26 PROBLEM — R54 ADVANCED AGE: Status: ACTIVE | Noted: 2019-01-01

## 2019-06-26 PROBLEM — R62.7 ADULT FAILURE TO THRIVE SYNDROME: Status: ACTIVE | Noted: 2019-01-01

## 2019-06-26 PROBLEM — E87.6 HYPOKALEMIA: Status: ACTIVE | Noted: 2019-01-01

## 2019-06-26 NOTE — PROGRESS NOTES
CM verified patient is eligible for discharge to [facility].   
 
 
MELINA Hughes

## 2019-06-26 NOTE — PROGRESS NOTES
Problem: Dysphagia (Adult) Goal: *Acute Goals and Plan of Care (Insert Text) Description Initiated 6/25/2019 1. Patient will tolerate regular diet, thin liquids free of s/s of aspiration within 7 days. Outcome: Progressing Towards Goal 
  
SPEECH LANGUAGE PATHOLOGY DYSPHAGIA TREATMENT Patient: Alexander Good (86 y.o. male) Date: 6/26/2019 Diagnosis: CHF (congestive heart failure) ([facility] [I50.9] <principal problem not specified> Precautions: swallow Fall ASSESSMENT: 
Patient with mild-moderate oral dysphagia characterized by oral holding, piecemeal swallow, and suspected premature spillage with thin liquids. Patient agreeable to solid trial this date, and no difficulty noted with mastication. Also with mild pharyngeal dysphagia characterized by delayed swallow initiation. Patient with strong coughing with thin liquids, however no overt s/s aspiration observed with nectar thick liquids. Daughter in agreement with modifying liquids to nectar thick given consistent coughing at bedside. Progression toward goals: 
?         Improving appropriately and progressing toward goals ? Improving slowly and progressing toward goals ? Not making progress toward goals and plan of care will be adjusted PLAN: 
Recommendations and Planned Interventions: 
--Regular/NECTAR liquid diet 
--Straws ok 
--SLP to follow for diet tolerance and liberalization as appropriate Patient continues to benefit from skilled intervention to address the above impairments. Continue treatment per established plan of care. Discharge Recommendations:  Thai Carter SUBJECTIVE:  
Patient with no verbalizations OBJECTIVE:  
Cognitive and Communication Status: 
Neurologic State: Alert Orientation Level: Other (Comment)(did not verbalize) Cognition: Decreased command following, Decreased attention/concentration Perception: Appears intact Perseveration: No perseveration noted Safety/Judgement: Awareness of environment, Fall prevention Dysphagia Treatment: P.O. Trials: 
Patient Position: upright in bed Vocal quality prior to P.O.: Other (comment)(no verbalizations) Consistency Presented: Thin liquid; Nectar thick liquid; Solid How Presented: Self-fed/presented;Cup/sip;SLP-fed/presented;Straw Bolus Acceptance: No impairment Bolus Formation/Control: Impaired Type of Impairment: Delayed;Premature spillage; Other (comment)(suspected with thins) Propulsion: Delayed (# of seconds) Oral Residue: None Initiation of Swallow: Delayed (# of seconds) Laryngeal Elevation: Functional 
Aspiration Signs/Symptoms: Strong cough; Other (comment)(with thin, none with nectar) Pharyngeal Phase Characteristics: Double swallow Effective Modifications: Alternate liquids/solids Cues for Modifications: Minimal 
  
  
  
   
Pain: 
Pain Scale 1: Numeric (0 - 10) Pain Intensity 1: 0 After treatment:  
?              Patient left in no apparent distress sitting up in chair ? Patient left in no apparent distress in bed 
? Call bell left within reach ? Nursing notified ? Caregiver present ? Bed alarm activated COMMUNICATION/EDUCATION:  
Patient was educated regarding His deficit(s) of dysphagia as this relates to His diagnosis. He demonstrated Guarded understanding as evidenced by no response to SLP. However, daughter verbalized understanding. The patients plan of care including recommendations, planned interventions, and recommended diet changes were discussed with: Registered Nurse. ? Posted safety precautions in patient's room. CRISTINA Crow Time Calculation: 13 mins

## 2019-06-26 NOTE — PROGRESS NOTES
Problem: Self Care Deficits Care Plan (Adult) Goal: *Acute Goals and Plan of Care (Insert Text) Description Occupational Therapy Goals Initiated 6/24/2019 1. Patient will perform self-feeding sitting unsupported with minimal assistance/contact guard assist within 7 day(s). 2.  Patient will perform anterior upper body bathing with minimal assistance/contact guard assist within 7 day(s). 3.  Patient will perform lower body ADLs with moderate assistance  within 7 day(s). 4.  Patient will perform toilet transfers with minimal assistance within 7 day(s). 5.  Patient will perform all aspects of toileting with minimal assistance/contact guard assist within 7 day(s). 6.  Patient will participate in upper extremity therapeutic exercise/activities with minimal assistance/contact guard assist for 5 minutes within 7 day(s). 7.  Patient will utilize energy conservation techniques during functional activities with verbal cues within 7 day(s). Outcome: Progressing Towards Goal 
 OCCUPATIONAL THERAPY TREATMENT Patient: Alexander Good (43 y.o. male) Date: 6/26/2019 Diagnosis: [facility]est[facility] [I50.9] <principal problem not specified> Precautions: Fall Chart, occupational therapy assessment, plan of care, and goals were reviewed.  
 
ASSESSMENT:  
The patient presents with Total assistance upper body ADLs, Total assistance lower body ADLs, and Total assistance assist functional mobility for rolling in bed, patient resistive at times with UE today with  on covers/lines, provided sponges due to intact  but remains not functional, 50% command following in 3 different trials for washing face, exercises and attention, only nodding yes/no questions no verbalizations this session, between trials patient with lethargy closing eyes without participating, then arousable again after 1-2 minute rest 
 
Educated family on simple yes/no questions, minimal stimuli and to allow rest and then awake moments, orienting to day as much as possible, family discussing possible hospice vs. SNF. The following are barriers to ADL independence while in acute care:  
- Cognitive and/or behavioral: orientation, attention to task, command following, processing, initiation, sequencing, safety awareness, insight into deficits and insight into abilities - Medical condition: strength, functional reach, functional endurance, sitting balance and medical history   
- Other:    
 
Prior level of function: fell earlier this year, 24/7 home care, was able to do some ADLs and was following commands, participating with ambulation with RW  
  
 
PLAN: 
Patient continues to benefit from skilled intervention to address the above impairments. Continue treatment per established plan of care. Recommend with staff: bed-chair positioning 3x daily Recommend next OT session: EOB ADLs, increased participation/attention to task, B UE functional use Discharge recommendations: Rehab at skilled nursing facility (SNF) (to regain functional baseline patient requires rehab) If above is not an option then recommend: 24 hour skilled services 24 supervision 
physical assist during all functional mobility Barriers to discharging home, in addition to above listed impairments: total assist driving to follow up medical appointment(s)/groceries/obtain medication 
family availability for education/training to then follow up at home 
level of physical assist required to maintain patient safety. Equipment recommendations for successful discharge (if) home: bedside commode, hospital bed and wheelchair tbd if apprpriate, pending hospice/SNF SUBJECTIVE:  
Patient nodding yes/no only to 50% given yes/no opportunitise OBJECTIVE DATA SUMMARY:  
Cognitive/Behavioral Status: 
Neurologic State: Alert Orientation Level: Unable to verbalize Cognition: Decreased attention/concentration;Decreased command following Functional Mobility and Transfers for ADLs: 
Bed Mobility: 
  
 
Transfers: Total A for rolling only, unsafe to mobilize,50% command following, with decreased attention between task attempts Unsafe to achieve EOB at this time due to moments of lethargy, eyes closed and decreased command following Balance: ADL Intervention: 
  
 
Grooming Washing Face: Maximum assistance(Stebbins max A handling at elbow, impaired initiation, sequencing) Attempts various multi-type stimulation in order to increased alertness to task, family assisting, patient with attention to a task with yes/no questions then lethargy noted with eyes closed and no command following, wax and wane even during session,  
  
 
Cognitive Retraining Organizing/Sequencing: Breaking task down Attention to Task: Single task Maintains Attention For (Time): 30 seconds Following Commands: Follows one step commands/directions(50% of given opportunities) Therapeutic Exercises:  
B UE AAROM shoulder flexion, stiffness in proximal joints and elbows resistive to ROM, tight constant grasp without commands to release, required physical opening of  Provided with sponges, intact grasp, command following, with 5 squeezes on command, then sudden lethargy, eyes closed, no command following. Improved alertness after 1-2 minutes. Pain: 
Unable to verbalize, did nod \"no\" Activity Tolerance:  
Poor and requires frequent rest breaks Please refer to the flowsheet for vital signs taken during this treatment. After treatment patient left:  
Bed alarm/tab alert on 
Caregiver at bedside Call light within reach RN notified Family at bedside Visitors at bedside COMMUNICATION/COLLABORATION:  
The patient?s plan of care was discussed with: Physical Therapist and Registered Nurse Sai Hyman OT Time Calculation: 23 mins

## 2019-06-26 NOTE — HOSPICE
[employer] [facility] Those in Need 
[phone] Patient Name: Norma Hicks YOB: 1931 Age: 80 y.o. [employer] RN Note:  Hospice consult noted. Chart reviewed. Plan of care discussed with patients nurse & care manager. In to meet with daughters and grandaughter. Discussed Hospice philosophy, general plan of care, levels of care, services and on call procedures. Family information packet provided & reviewed. Family is leaning toward sending patient to rehab for a few days to see if there is any improvement and then take him home with hospice. They understand that the facility where he is going, [facility] does not do hospice. They will contact us when they are ready for home admission. Contact info for Intake provided. Thank you for the opportunity to be of service to this patient.

## 2019-06-26 NOTE — PROGRESS NOTES
CM spoke with patient and patient's daughter, Sintia Joseph, at bedside regarding choice for SNF. Ms. Faustina Hummel advised CM she spoke with her sister Renetta Espinosa) and they chose [facility] for SNF. Ms. Faustina Hummel and her sister Reyes Greaser) are requesting a family meeting with hospitalist Lizz Ellington), palliative medicine, and cardiologist, if possible this morning. CM advised Ms. Faustina Hummel we would try and schedule a meeting today. Demetrice Martin is on her way to the hospital.   
 
CM paged Palliative Medicine and Hospitalist. 
 
Per chart - Patient is likely going to be medically stable ready for discharge to SNF today pending 2 lab results. CM will continue to follow. 1021 - CM sent referral to [facility] via [address]. 1035 - CM notified by [facility] that bed available tomorrow. Slight possibility of bed available late this afternoon. P[facility] Wilson [facility] with [facility]. 1100 - Hospitalist informed CM of possible Home with Hospice. Palliative Medicine to meet with patient's family to discuss. 1155 - CM updated by Palliative Care Team that family wishes to have informational consult meeting with Hospice. Plan for now is for patient to be discharged to [address] [facility] Luci received referral for hospice informational session. CM sent referral through [address] [facility]. 1315 - CM received call from [facility] and [address] [place] [address]. Bed available (Room 400/B). CM to update hospitalist and family. 1403 - CM sent transportation referral to [facility] via [employer] requesting 4:00PM pickup time. 1440 - CM notified [facility] can  patient at 5:00PM.  CM updated family of bed available at [facility] room 400/B. Kacey Chavez, MPH

## 2019-06-26 NOTE — HOSPICE
[employer] [facility] Those in Need 
[phone] Patient Name: Charissa Hughes YOB: 1931 Age: 80 y.o. [employer] RN Note:  Hospice consult received, reviewing chart. Will follow up with Unit Nurse and Care Manager to discuss plan of care, patient status and discharge disposition within the hour. Thank you for the opportunity to be of service to this patient.

## 2019-06-26 NOTE — DISCHARGE SUMMARY
Discharge Summary PATIENT ID: Charissa Hughes MRN: [id number] YOB: 1931 DATE OF ADMISSION: 6/23/2019  9:14 AM   
DATE OF DISCHARGE:6/26/19 2:45pm 
PRIMARY CARE PROVIDER: Nicolasa Roman MD  
 
ATTENDING PHYSICIAN: Dorinda Mike DISCHARGING PROVIDER: Raghav George MD   
To contact this individual call [phone] and ask the  to page. If unavailable ask to be transferred the Adult Hospitalist Department. CONSULTATIONS: IP CONSULT TO CARDIOLOGY 
IP CONSULT TO HOSPITALIST 
IP CONSULT TO PALLIATIVE CARE - PROVIDER PROCEDURES/SURGERIES: * No surgery found * [address] COURSE:  
70-year-old gentleman with a past medical history significant for primary hypertension, obstructive sleep apnea, atrial fibrillation and not on chronic anticoagulation due to concerns for falls, pacemaker placement, gastroesophageal reflux disease, dyslipidemia, degenerative joint disease, prostate cancer, and Graves disease was brought to the emergency room from home by EMS for complaints of altered mental status, shortness of breath, and lethargy. Per the patient's daughter at the bedside, the patient was at his baseline health up until the morning of emergency room presentation when the patient was noted to be slightly confused and unable to get out of bed.  The patient also noted with worsening shortness of breath. Per the documentation, on EMS's arrival, the patient was noted with a blood sugar level of 733 mg/dL, systolic blood pressure of 170, and heart rate ranging from 90s to 120s.  Per the patient's daughter, the patient without any history of recent falls or trauma; no complaints of headaches, visual deficits, chest pains, palpitations, nausea, vomiting, cough, fever, chills, abdominal pain, bladder or bowel irregularities.  However, the patient did complain of some dizziness surrounding the acute events.  
 
DISCHARGE DIAGNOSES / PLAN:   
 
 1) CNS: Acute encephalopathy- NOS present on admission. Likely a case of delirium on baseline advanced dementia CT head neg for acute findings but noted was significant atrophy and ventriculomegaly. Physical exam suspicious for acute CVA- he is already on aspirin and statin- 
 unable to get MRI due to pacemaker OT/PT eval- CM for SNF placement 
 normal cortisol and ammonia levels, thyroid meds adjusted 2) CVS: Acute on chronic combined Systolic and diastolic CHF. Chronic Afib. Fairly well controlled Primary Hypertension. Dyslipidemia. EF 46-50 percent (2/22/19). S/P Pacemaker placement. Continue oxygen, he is not a candidate for diuretics or ace/arb on DC due to severe hypotension,  Current 2DECHO and Heart failure Team eval. Cardiology eval noted and appreciated. NYHA Class III at the time of DC 
  
3) Anticoagulation: [id number] score of at least 4 predisposing to an increased risk of thromboembolism. However due to him being such a high fall risk, will hold off on chronic anticoagulation Risks/benefits D/W family.   
4) GI: H/O GERD. Emesis. PPI and anti-emetics while inpatient   
5) Renal: mildly elevated creatinine related to dehydration from  poor PO intake related to cognitive impairment.  
  
6) Oncology: H/O Prostate Cancer. 
  
7) Endocrinology: H/O Graves Disease. H/O Thyroidectomy. TSH elevated at 22.90, normal FT4 1.1 Levothyroxine changed from 125 micrograms to 150 micrograms daily. Will need outpatient F/U TFTs with further dose adjustment. 
  
8) Resp: H/O Obstructive Sleep Apnea. - he desats even with oxygen via NC but usually wont go below 88% on 2L via NC 
  
9) Prophylaxis: DVT- hepain while inpatient. 
  
10) Directives: DNR with an extremely guarded prognosis. Family met with palliative care and hospice during this hospitalization 
  
11) Plan: Anticipate D/C to SNF tomorrow- checking ammonia and cortisol levels ADDITIONAL CARE RECOMMENDATIONS:  
 Monitor potassium levels periodically Oxygen 2L via NC when napping and at night when sleeping Regular diet with 2/nectar thick liq DNR code status Fall risk PENDING TEST RESULTS:  
At the time of discharge the following test results are still pending: none FOLLOW UP APPOINTMENTS:   
Follow-up Information Follow up With Specialties Details Why Contact Info [address] [address] [facility] [address] 
[phone] Jose R Mora MD Pediatrics, Internal Medicine   [place] [address] [address] [facility] 
[phone] DIET: regular with  
 
ACTIVITY:as tolerated- fall risk WOUND CARE: to sacral area EQUIPMENT needed: walker and oxygen DISCHARGE MEDICATIONS: 
Current Discharge Medication List  
  
START taking these medications Details  
carvedilol (COREG) 3.125 mg tablet Take 1 Tab by mouth two (2) times daily (with meals). Qty: 60 Tab, Refills: 2  
  
ondansetron (ZOFRAN ODT) 4 mg disintegrating tablet Take 1 Tab by mouth every eight (8) hours as needed for Nausea. Qty: 30 Tab, Refills: 0 CONTINUE these medications which have CHANGED Details  
levothyroxine (SYNTHROID) 150 mcg tablet Take 1 Tab by mouth Daily (before breakfast). Qty: 30 Tab, Refills: 2 CONTINUE these medications which have NOT CHANGED Details  
pravastatin (PRAVACHOL) 40 mg tablet Take 1 Tab by mouth daily. Must have made doctor appointment for refill. Qty: 90 Tab, Refills: 0 Associated Diagnoses: Primary hypercholesterolemia  
  
bisacodyl (DULCOLAX) 5 mg EC tablet Take 1 Tab by mouth as needed for Constipation. Qty: 30 Tab, Refills: 0  
  
aspirin delayed-release 81 mg tablet Take 81 mg by mouth daily. cholecalciferol (VITAMIN D3) 1,000 unit tablet Take 1,000 Units by mouth daily.   
  
  
 
 
 
NOTIFY YOUR PHYSICIAN FOR ANY OF THE FOLLOWING:  
 Fever over 101 degrees for 24 hours. Chest pain, shortness of breath, fever, chills, nausea, vomiting, diarrhea, change in mentation, falling, weakness, bleeding. Severe pain or pain not relieved by medications. Or, any other signs or symptoms that you may have questions about. DISPOSITION: 
  Home With: 
 OT  PT  HH  RN  
  
X SNF/Inpatient Rehab Independent/assisted living Hospice Other:  
 
 
PATIENT CONDITION AT DISCHARGE:  
 
Functional status X Poor Deconditioned Independent Cognition Jasper Adie Forgetful X Dementia Catheters/lines (plus indication) Owens PICC   
 PEG   
X None Code status Full code X DNR   
 
PHYSICAL EXAMINATION AT DISCHARGE: 
Patient Vitals for the past 24 hrs: 
 Temp Pulse Resp BP SpO2  
06/26/19 1109 97.6 °F (36.4 °C) 93 16 97/71 99 % 06/26/19 0759 97.5 °F (36.4 °C) 94 16 94/67 98 %  
06/26/19 0444 97.6 °F (36.4 °C) 90 18 109/85 96 %  
06/26/19 0006 98.2 °F (36.8 °C) 91 18 103/84 96 %  
06/25/19 1946 98.1 °F (36.7 °C) 94 18 93/69 96 %  
06/25/19 1501 98.1 °F (36.7 °C) 92 14 105/69 95 % Constitutional:  Chronically ill-appearing. HEENT: Head is a traumatic, Un icteric sclera. Pink conjunctiva,no erythema or discharge. Oral mucous moist, oropharynx benign. Neck supple, Resp:  Decreased air entry bilaterally. CV:  Regular rhythm, normal rate, no murmurs, gallops, rubs GI:  Soft, non distended, non tender. normoactive bowel sounds, no hepatosplenomegaly :  No CVA or suprapubic tenderness Skin  :  Skin tear LUE. Musculoskeletal:  Bipedal edema. Neurologic: Lethargic, choreform movements in left arm Recent Results (from the past 24 hour(s)) SAMPLES BEING HELD Collection Time: 06/25/19  5:12 PM  
Result Value Ref Range SAMPLES BEING HELD 1pst   
 COMMENT Add-on orders for these samples will be processed based on acceptable specimen integrity and analyte stability, which may vary by analyte. AMMONIA Collection Time: 06/26/19  4:56 AM  
Result Value Ref Range Ammonia 21 <46 UMOL/L  
METABOLIC PANEL, BASIC Collection Time: 06/26/19  8:44 AM  
Result Value Ref Range Sodium 140 136 - 145 mmol/L Potassium 3.4 (L) 3.5 - 5.1 mmol/L Chloride 109 (H) 97 - 108 mmol/L  
 CO2 25 21 - 32 mmol/L Anion gap 6 5 - 15 mmol/L Glucose 100 65 - 100 mg/dL BUN 28 (H) 6 - 20 MG/DL Creatinine 1.24 0.70 - 1.30 MG/DL  
 BUN/Creatinine ratio 23 (H) 12 - 20 GFR est AA >60 >60 ml/min/1.73m2 GFR est non-AA 55 (L) >60 ml/min/1.73m2 Calcium 9.9 8.5 - 10.1 MG/DL  
 
 
 
CHRONIC MEDICAL DIAGNOSES: 
Problem List as of 6/26/2019 Date Reviewed: 6/26/2019 Codes Class Noted - Resolved Adult failure to thrive syndrome ICD-10-CM: R62.7 ICD-9-CM: 783.7  6/26/2019 - Present Advanced age ICD-10-CM: R48 
ICD-9-CM: 383  6/26/2019 - Present Hypokalemia ICD-10-CM: E87.6 ICD-9-CM: 276.8  6/26/2019 - Present CHF (congestive heart failure) (HCC) ICD-10-CM: I50.9 ICD-9-CM: 428.0  6/23/2019 - Present Acute metabolic encephalopathy CSI-42-NE: G93.41 
ICD-9-CM: 348.31  6/23/2019 - Present Debility ICD-10-CM: R53.81 ICD-9-CM: 799.3  6/23/2019 - Present Pacemaker ICD-10-CM: Z95.0 ICD-9-CM: V45.01  2/25/2019 - Present Overview Signed 2/25/2019  1:06 PM by Dimitrsi John MD  
  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 
ICD-9-CM: 427.32  2/22/2019 - Present Syncope ICD-10-CM: R55 
ICD-9-CM: 780.2  2/22/2019 - Present Accelerated hypertension ICD-10-CM: I10 
ICD-9-CM: 401.0  2/22/2019 - Present Fall ICD-10-CM: W19. C[place] ICD-9-CM: E888.9  2/22/2019 - Present Left ventricular hypertrophy due to hypertensive disease ICD-10-CM: I11.9 ICD-9-CM: 402.90  9/23/2015 - Present Memory deficit ICD-10-CM: R41.3 ICD-9-CM: 780.93  12/21/2014 - Present Overview Signed 12/21/2014  5:36 PM by Erich Napier MD  
  Needs MMSE when seen next. Hyperparathyroidism, primary ([address] ICD-10-CM: E21.0 ICD-9-CM: 252.01  6/19/2014 - Present Chronic kidney disease (CKD), stage III (moderate) (HCC) ICD-10-CM: N18.3 ICD-9-CM: 585.3  6/19/2014 - Present Bradycardia ICD-10-CM: R00.1 ICD-9-CM: 427.89  6/19/2013 - Present PVC's (premature ventricular contractions) ICD-10-CM: I49.3 ICD-9-CM: 427.69  6/19/2013 - Present Nonunion of fracture ICD-10-CM: [id number] ICD-9-CM: 733.82  11/29/2012 - Present Drug-induced osteoporosis ICD-10-CM: M81.8, T50.905A ICD-9-CM: 733.09, E947.9  10/16/2012 - Present Overview Signed 10/16/2012  2:12 PM by Taurus Bhat. Hip fracture 2012, on hormonal therapy for prostate CA 
BMD 10/12  T for spine is -0.7,  Left forearm  -0.8  Let fem neck  -1.6 Traumatic osteoarthritis of left ankle ICD-10-CM: O39.926 ICD-9-CM: 715.27  10/4/2012 - Present Overview Signed 10/4/2012 11:10 AM by Taurus Bhat. Severe aggravation of long standing post-traumatic pain and arthritis, sudden exacerbation 9/13 with persistent inability to bear any weight. Evaluated by Dr. Migdalia Wallis Post-surgical hypothyroidism ICD-10-CM: E89.0 ICD-9-CM: 244.0  4/5/2012 - Present Overview Addendum 3/17/2013  8:38 AM by Taurus Bhat. Graves disease. Dose varies between 150 and 175, given problems with bone disease, will err on the side of higher TSH and use 150.  
  
  
   
 Prostate cancer [facility]) ICD-10-CM: G24 
 ICD-9-CM: 185  9/13/2010 - Present Overview Addendum 1/9/2012  2:12 PM by Duncan Chawla. Prostate biopsy 9/10 , cancer in 1/12 biopsy, annmarie score 3+3=6 Repeat biopsy 4/11 cancer in 7/14 specimens   Annmarie 5+3 =8, progression of disease over last year . 5/11 Rx with hormonal therapy and radiation PSA of 0 9/11 Low back pain ICD-10-CM: M54.5 ICD-9-CM: 724.2  4/1/2010 - Present Obstructive sleep apnea ICD-10-CM: G47.33 
ICD-9-CM: 327.23  3/22/2010 - Present Overview Signed 11/30/2010 10:19 AM by Jose Armando Mccauley On CPAP regularly Colon polyp ICD-10-CM: K63.5 ICD-9-CM: 211.3  3/22/2010 - Present Primary hypercholesterolemia ICD-10-CM: E78.00 ICD-9-CM: 272.0  3/22/2010 - Present Benign hypertension with chronic kidney disease, stage III (HCC) ICD-10-CM: I12.9, N18.3 ICD-9-CM: 403.10, 585.3  3/22/2010 - Present Chorioretinitis ICD-10-CM: H30.90 ICD-9-CM: 363.20  3/22/2010 - Present Overview Signed 3/22/2010  9:48 AM by Duncan Chawla. 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  
Saadia Craven MD 
6/26/2019 
2:48 PM

## 2019-06-26 NOTE — PROGRESS NOTES
Pacemaker interrogated, Normal functioning. Pacemaker rate is currently at 90 bpm.  D/W Dr. Malagon Charter- recommends change rate to 70 bpm in light of reduced EF. D/W [employer] rep- rate will be adjusted to 70bpm this afternoon. Full progress note to follow.

## 2019-06-26 NOTE — PROGRESS NOTES
Cardiology Progress Note Admit Date: 6/23/2019 Admit Diagnosis: CHF (congestive heart failure) ([facility] [address] [I50.9] Date: 6/26/2019     Time: 10:51 AM 
 
HPI: presented with severe diaphoresis. Resolved per nursing with oxygen. Later given bumex. Testing shows elevated pro BNP Pt unable to provide any history, denies current chest pain or shortness of breath. Triage note indicated daughter attempted to get pt out of bed and was unable to do so. Typically able to get up and respond appropriately. He is pale and diaphoretic. Has PMH HTN, CLINT, GERD, Graves, unexplained orthostatic hypotension put on midodrine. Unable to control Afib rate and had AV sole ablation 2/25/19, leadless pacer. Missed f/u appointment 6/13/19- daughter said she didn't know about appointment. Scalp lac from fall 5/14/19. Pt lives with daughter. Subjective:  Pt is somnulent this a.m. Wakes up briefly but then returns to sleep. Daughter at bedside reports pt is more lethargic today. Assessment and Plan 1. Cardiomyopathy, acute HFrEF: EF 20% (was 46-50% 2/2019) -NYHA III 
 -Hx of cardiomyopathy-EF  -Appears compensated for now- lungs clear, no edema. 
 -Coreg to 6.25 mg BID with hold parameters (SBP 95 or less) Dose was increased on 6/25/19 
 -Hold on starting ace-I d/t low BP 
 -Off diuretic for now. -Decrease pacing rate to 70 bpm 
  
3. Persistent AFib, with AVN ablation and pacer (leadless)-stable rhythm 
 -currently afib/paced, rate 90's 
 -Pacer interrogated- NL function. Rate has been at 90 since 2/2019 post avn ablation. Will decrease PPM rate to 70 bpm- notified [employer] 
 -Coreg 6.25 mg BID 
 -Not on OAC, fall risk. Is on asa 81 mg daily 4. Moderate to severe AS 
 -Low output-low gradient 
 -Treat medically. 
  
5. Hx orthostasis on midodrine at home 
 -SBP in 90's. Off midodrine. 5. Hypothyroidism-  
 -TSH 23.   Free T4 NL 
 -synthroid dose was increased this admission. 6. Advanced directives: 
 -Disposition: trial of SNF/rehab is planned but family will speak with hospice here for information in case pt were to decline. 7. Hypokalemia: replete. Pt is more lethargic today. Palliative care following, DNR status now in place. Low BP limits addition of ace-I. Pt has had PPM set at 90 since AVN ablation in 2/2019. PPM functioning properly, [employer] to reduce rate today to 70 bpm. 
I have seen and examined the patient and agree with N.P. Assessment. Change PPM rate to 70 No clear cardiac cause of lethargy although I suspect CMP as contributing to it Ammonia levels are normal 
tft as per primary team  
 
 
Objective: 
  
 Physical Exam: 
             
Visit Vitals BP 97/71 (BP 1 Location: Left arm, BP Patient Position: At rest) Pulse 93 Temp 97.6 °F (36.4 °C) Resp 16 Ht 6' 2\" (1.88 m) Wt 205 lb (93 kg) SpO2 99% BMI 26.32 kg/m² General Appearance:   Well developed,individual in no acute distress. Lethargic today. Ears/Nose/Mouth/Throat:    Hearing grossly normal. 
  
    Neck:  Supple. Chest:    Lungs diminished bases otherwise clear. Cardiovascular:   Regular rate and rhythm, S1, S2 normal,grade II/VI systolic murmur Abdomen:    Soft, non-tender, non-distended. Extremities:  No edema bilaterally. Skin:  Warm and dry. Telemetry: v paced, Data Review:  
 Labs:   
Recent Results (from the past 24 hour(s)) CORTISOL Collection Time: 06/25/19  2:41 PM  
Result Value Ref Range Cortisol, random 22.2 ug/dL SAMPLES BEING HELD Collection Time: 06/25/19  5:12 PM  
Result Value Ref Range SAMPLES BEING HELD 1pst   
 COMMENT Add-on orders for these samples will be processed based on acceptable specimen integrity and analyte stability, which may vary by analyte. AMMONIA Collection Time: 06/26/19  4:56 AM  
Result Value Ref Range  Ammonia 21 <32 UMOL/L  
 METABOLIC PANEL, BASIC Collection Time: 06/26/19  8:44 AM  
Result Value Ref Range Sodium 140 136 - 145 mmol/L Potassium 3.4 (L) 3.5 - 5.1 mmol/L Chloride 109 (H) 97 - 108 mmol/L  
 CO2 25 21 - 32 mmol/L Anion gap 6 5 - 15 mmol/L Glucose 100 65 - 100 mg/dL BUN 28 (H) 6 - 20 MG/DL Creatinine 1.24 0.70 - 1.30 MG/DL  
 BUN/Creatinine ratio 23 (H) 12 - 20 GFR est AA >60 >60 ml/min/1.73m2 GFR est non-AA 55 (L) >60 ml/min/1.73m2 Calcium 9.9 8.5 - 10.1 MG/DL Radiology:  
 
  
Current Facility-Administered Medications Medication Dose Route Frequency  balsam peru-castor oil (VENELEX) ointment   Topical BID  carvedilol (COREG) tablet 6.25 mg  6.25 mg Oral BID WITH MEALS  dextrose 5% and 0.9% NaCl infusion  50 mL/hr IntraVENous CONTINUOUS  
 cefTRIAXone (ROCEPHIN) 1 g in 0.9% sodium chloride (MBP/ADV) 50 mL  1 g IntraVENous Q24H  
 famotidine (PF) (PEPCID) 20 mg in sodium chloride 0.9% 10 mL injection  20 mg IntraVENous DAILY  ondansetron (ZOFRAN) injection 4 mg  4 mg IntraVENous Q6H PRN  
 acetaminophen (TYLENOL) tablet 650 mg  650 mg Oral Q4H PRN  
 docusate sodium (COLACE) capsule 100 mg  100 mg Oral PRN  
 heparin (porcine) injection 5,000 Units  5,000 Units SubCUTAneous Q12H  aspirin delayed-release tablet 81 mg  81 mg Oral DAILY  bisacodyl (DULCOLAX) tablet 5 mg  5 mg Oral PRN  cholecalciferol (VITAMIN D3) tablet 1,000 Units  1,000 Units Oral DAILY  pravastatin (PRAVACHOL) tablet 40 mg  40 mg Oral DAILY  levothyroxine (SYNTHROID) tablet 150 mcg  150 mcg Oral ACB Lavniia Amador. [facility] [address] [place] [address], [address] Andria Bonilla 
 [phone]

## 2019-06-26 NOTE — PROGRESS NOTES
0730: Bedside shift change report given to Mariela Oropeza (oncoming nurse) by Cammy Lopez (offgoing nurse). Report included the following information SBAR, Kardex and Cardiac Rhythm paced. 1700: I have reviewed discharge instructions with the patient. The patient verbalized understanding. Patient discharged to [facility]. [facility] transporting patient. Report given to nurse at [facility]. Discharge Medication List as of 6/26/2019  4:09 PM  
  
START taking these medications Details  
carvedilol (COREG) 3.125 mg tablet Take 1 Tab by mouth two (2) times daily (with meals). , Print, Disp-60 Tab, R-2, Hospice coverage not set  
  
ondansetron (ZOFRAN ODT) 4 mg disintegrating tablet Take 1 Tab by mouth every eight (8) hours as needed for Nausea. , Print, Disp-30 Tab, R-0, Hospice coverage not set CONTINUE these medications which have CHANGED Details  
levothyroxine (SYNTHROID) 150 mcg tablet Take 1 Tab by mouth Daily (before breakfast). , Print, Disp-30 Tab, R-2, Hospice coverage not set CONTINUE these medications which have NOT CHANGED Details  
pravastatin (PRAVACHOL) 40 mg tablet Take 1 Tab by mouth daily. Must have made doctor appointment for refill., Normal, Disp-90 Tab, R-0  
  
bisacodyl (DULCOLAX) 5 mg EC tablet Take 1 Tab by mouth as needed for Constipation. , No Print, Disp-30 Tab, R-0  
  
aspirin delayed-release 81 mg tablet Take 81 mg by mouth daily. , Historical Med  
  
cholecalciferol (VITAMIN D3) 1,000 unit tablet Take 1,000 Units by mouth daily. , Historical Med

## 2019-06-26 NOTE — PROGRESS NOTES
[facility]: [phone] ([phone]) [facility]: [phone] ([phone]) Code Status: DNR Advance Care Planning: 
Advance Care Planning 6/24/2019 Patient's Healthcare Decision Maker is: - Confirm Advance Directive None Does the patient have other document types - LNOK: 
  Primary Decision Maker: Preeti Lopez - [phone] Primary Decision Maker: Jose Tony - Daughter - [phone] Patient / Family Encounter Documentation Participants (names): patient daughters/LNOK Viviana Kerr and Jacob Chakraborty, granddaughter/CG Cut off, Palliative Medicine Scott Quinn, Dr. Elkin Claire) Narrative:  
 
Per chart: 
Kodi Rachell is a 80 y.o. with a past history of afib, PPM,HTN, CLINT, GERD, hypercholesterolemia, DJD, prostate cancer, [place], Graves disease, who was admitted on 6/23/2019 from home due to AMS,dizziness,shortness of breath, with a diagnosis of Metabolic encephalopathy, dehydration, elevated Bnp without overt CHF s/s of exacerbation. W/ worsening of HF, EF now 20% this admission compared to 46-50 in 2/2019. Head CT neg for acute issues. Cannot get MRI due to pacemaker, already on ASA and statin. Current medical issues leading to Palliative Medicine involvement include: support with care decisions. Full Code status.  
  
Dr. Elkin Claire and I met with patient family in meeting room. Patient resting in bed. He was awake and alert but not talking today. He was unable to participate in conversation about his care. 1. Daughters had requested another meeting with palliative after Dr. Amber Leonard discussed hospice with them. 2. We reviewed how patient had been doing at home before hospitalization, not many prior admissions. Also reviewed hospice philosophy and focus on managing symptoms rather than treating illness. Patient does qualify for hospice with CHF EF 20%.  
3. Family concerned patient won't get back to baseline, not sure what is best route to take given patient has been less responsive in recent days and unable to discuss what he would want 4. We discussed that it might be helpful for patient to try rehab and if he doesn't do well, hospice is always available to them. They agreed to hospice consult prior to d/c (probably tomorrow). 5. Discussed code status in context of patient illness. Family believes that patient wouldn't want resuscitative efforts with low likelihood of meaningful recovery. Completed DDNR. 6. Family is realistic about patient's prognosis and willing to watch patient for signs of best course for care. If patient declines or has acute event, hospice would be best option. They were not tearful or particularly sad during hospice discussion - focused on logistics. Psycho: unable to assess, though seemed angry this morning Social:  since 2006, 2 children Adalberto Jean-Baptiste and Matty Lozano, born in [place] but moved to [place] 60 years ago, retired from [employer], goes by [employer] Spiritual: Moravian 
Baseline: fully alert and oriented. fell March 2019 and has had 24 hour caregivers since, uses walker, needs help with most ADL's, able to travel to pcp's office with assistance, 
 
 
Goals of Care / Plan:  
 
Hospice consult placed/CM updated D/c to [facility] when bed available DDNR Original and copy of DDNR on chart to be transported with patient to SNF Copy on chart to be scanned Copy with family. Thank you for the opportunity to be involved in the care of Mr. Esvin Corbin and his family. Courtney Warren, [facility] Palliative Medicine  [phone]

## 2019-06-26 NOTE — PROGRESS NOTES
Palliative Medicine Consult Danilo: [phone] ([phone]) Patient Name: Barb Rodarte YOB: 1931 Date of Initial Consult: June 24, 2019 Reason for Consult: Care Decisions Requesting Provider: Dr. Flaquito Beaulieu Primary Care Physician: Vimal Melo MD 
 
 SUMMARY:  
Barb Rodarte is a 80 y.o. with a past history of afib, PPM,HTN, CLINT, GERD, hypercholesterolemia, DJD, prostate cancer, [place], Graves disease, who was admitted on 6/23/2019 from home due to AMS,dizziness,shortness of breath, with a diagnosis of Metabolic encephalopathy, dehydration, elevated Bnp without overt CHF s/s of exacerbation. W/ worsening of HF, EF now 20% this admission compared to 46-50 in 2/2019. Head CT neg for acute issues. Cannot get MRI due to pacemaker, already on ASA and statin. Current medical issues leading to Palliative Medicine involvement include: support with care decisions. Full Code status. Social:  since 2006, uses walker, fell March 2019 and has had 24 hour caregivers since, needs help with most ADL's, able to travel to pcp's office with assistance, 2 children Amairani Lang and Erin Oglesby born in [place] but moved to [place] 60 years ago, retired from [employer]. At baseline fully alert and oriented. PALLIATIVE DIAGNOSES:  
1. Acute metabolic encephalopathy 2. Dehydration 3. Malaise 4. shortness of breath 5. debility PLAN:  
1. Along w/ Jalen Penn LCSW meet first w/ pt (not answering questions, seems annoyed w/ us asking) and then w/ dtrs Amairani Lang and Erin Oglesby [employer] [facility]) and granddtr who helps pt at home Cut off. 2. Family has had questions about plan of care. States that other physicians have mentioned hospice and want to know more about this.   
3. This is the first time I am meeting pt- but per family and chart - pt has not been in a cycle of in/out of the hospital, prior to admission could do most ADLs w/ some assistance (high fall risk), liked to be in his own home, and could still get to all doctor appointments. 4. Because of good function prior to admission, I think that SNF could help see how much pt can improve and be as functional as he can to get home. Being at home is very important to pt- would not want to be in a cycle of in/out of the hospital, but would be okay w/ going to rehab if it helped him stay at home. 5. From a medical perspective, would meet hospice criteria given CHF and aortic stenosis. Explain hospice philosophy and care. Family would still provide 24/7 care. They would want hospice in pt's home. 6. Recommend hospice information session while he is here. Pt may not do well in rehab- if he does not make gains, if he has another acute event, then would recommend hospice. It will help if hospice team knows of pt- make a smoother transition. Family aware they can get hospice at any time at home if they wish. 7. Talk about code status - family had wanted to engage pt, but tell them that he may never regain the ability to make complex medical decisions and thus we look to them. If he does regain the ability, they can always discuss in the future. 8. Both dtrs agree that pt would never want attempts at resuscitation given chronic, progressive medical issues. DDNR signed. 9. Plan: Pt to go to SNF to see how much function he can regain. They will have hospice info session here, so that if pt declines they can call for hospice in the home. DDNR signed. 10. Communicated plan of care with: Palliative [facility] Team incl Mathieu care management and Nehal LYONS 
 
 GOALS OF CARE / TREATMENT PREFERENCES:  
 
GOALS OF CARE: 
Patient/Health Care Proxy Stated Goals: Prolong life TREATMENT PREFERENCES:  
Code Status: DNR-DDNR signed Advance Care Planning: 
[x] [facility] Interdisciplinary Team has updated the ACP Navigator with [place] and Patient Capacity Primary Decision Maker: Sarah Chambers - [phone] Primary Decision Maker: Dang Ramirez - Yomaira - [phone] Advance Care Planning 6/24/2019 Patient's Healthcare Decision Maker is: - Confirm Advance Directive None Does the patient have other document types - Medical Interventions: Full interventions Other Instructions:  
Artificially Administered Nutrition: No feeding tube Other: As far as possible, the palliative care team has discussed with patient / health care proxy about goals of care / treatment preferences for patient. HISTORY:  
 
 
 
CHIEF COMPLAINT: cannot assess HPI/SUBJECTIVE: The patient is:  
[x] Verbal and participatory but limited  
[] Non-participatory due to: Pt will not answer questions. Family states that he gets very tired w/ orientation questions. Good eye contact. Eating some. Clinical Pain Assessment (nonverbal scale for severity on nonverbal patients):  
Clinical Pain Assessment Severity: 0 Activity (Movement): Lying quietly, normal position Duration: for how long has pt been experiencing pain (e.g., 2 days, 1 month, years) Frequency: how often pain is an issue (e.g., several times per day, once every few days, constant) FUNCTIONAL ASSESSMENT:  
 
Palliative Performance Scale (PPS): PPS: 40 PSYCHOSOCIAL/SPIRITUAL SCREENING:  
 
Palliative IDT has assessed this patient for cultural preferences / practices and a referral made as appropriate to needs (Cultural Services, Patient Advocacy, Ethics, etc.) Any spiritual / Pentecostalism concerns: 
[] Yes /  [x] No 
 
Caregiver Burnout: 
[] Yes /  [x] No /  [] No Caregiver Present Anticipatory grief assessment:  
[x] Normal  / [] Maladaptive ESAS Anxiety: Anxiety: 0 
 
ESAS Depression:    
 
 
 REVIEW OF SYSTEMS:  
 
Positive and pertinent negative findings in ROS are noted above in HPI. The following systems were [x] reviewed  [] unable to be reviewed as noted in HPI Other findings are noted below. Systems: constitutional, ears/nose/mouth/throat, respiratory, gastrointestinal, genitourinary, musculoskeletal, integumentary, neurologic, psychiatric, endocrine. Positive findings noted below. Modified ESAS Completed by: provider Fatigue: 4 Drowsiness: 1 Pain: 0 Anxiety: 0 Nausea: 0 Dyspnea: 0 Stool Occurrence(s): 1 PHYSICAL EXAM:  
 
From RN flowsheet: 
Wt Readings from Last 3 Encounters:  
06/26/19 205 lb (93 kg) 03/01/19 220 lb 10.9 oz (100.1 kg) 12/13/18 230 lb (104.3 kg) Blood pressure 97/71, pulse 93, temperature 97.6 °F (36.4 °C), resp. rate 16, height 6' 2\" (1.88 m), weight 205 lb (93 kg), SpO2 99 %. Pain Scale 1: Numeric (0 - 10) Pain Intensity 1: 0 Last bowel movement, if known:  
 
Constitutional: awake, alert, nad, not talking to me Eyes: anicterics ENMT: no nasal discharge, moist mucous membranes Respiratory: breathing not labored Musculoskeletal: no deformity Neurologic: disoriented, moving all extremities, malaise Psychiatric:neg agitaiton HISTORY:  
 
Active Problems: 
  CHF (congestive heart failure) ([address] (6/23/2019) Acute metabolic encephalopathy (7/55/4530) Debility (6/23/2019) Past Medical History:  
Diagnosis Date  Cancer [facility])   
 prostate  DJD (degenerative joint disease)  Elevated cholesterol  GERD (gastroesophageal reflux disease)  Hyperparathyroidism, primary ([facility] [address]  Hypertension  Obstructive sleep apnea   
 uses cpap  Thyroid disease HX of Graves Disease Past Surgical History:  
Procedure Laterality Date  HX HEENT    
 thyroidectomy  HX HEENT    
 tonsilectomy [facility], [address]  HX ORTHOPAEDIC    
 right femoral neck fracture  HX ORTHOPAEDIC    
 bilateral ankle surgery  HX ORTHOPAEDIC  12/12/12 LEFT ANKLE REFUSION AND REMOVAL POSTERIOR SCREW  
 NV COLONOSCOPY FLX DX W/COLLJ SPEC WHEN PFRMD  12/11/2006 Dr Debora Ayers  repeat 12/2011  NV ICAR CATHETER ABLATION ATRIOVENTR NODE FUNCTION N/A 2/25/2019 ABLATION AV NODE performed by Ada Coley MD at [address], [facility] Dr CATH LAB  NV PROSTATE BIOPSY, NEEDLE, SATURATION SAMPLING    
 NV TRANSCATH INSERT OR REPLACE LEADLESS PM VENTR N/A 2/25/2019 INSERT OR REPLACE TRANSCATH PPM LEADLESS performed by Ada Coley MD at [address], [facility] CATH LAB Family History Problem Relation Age of Onset  Hypertension Mother  Heart Disease Father CAD  Heart Disease Brother History reviewed, no pertinent family history. Social History Tobacco Use  Smoking status: Never Smoker  Smokeless tobacco: Never Used Substance Use Topics  Alcohol use: Yes Comment: rare No Known Allergies Current Facility-Administered Medications Medication Dose Route Frequency  balsam peru-castor oil (VENELEX) ointment   Topical BID  carvedilol (COREG) tablet 6.25 mg  6.25 mg Oral BID WITH MEALS  dextrose 5% and 0.9% NaCl infusion  50 mL/hr IntraVENous CONTINUOUS  
 cefTRIAXone (ROCEPHIN) 1 g in 0.9% sodium chloride (MBP/ADV) 50 mL  1 g IntraVENous Q24H  
 famotidine (PF) (PEPCID) 20 mg in sodium chloride 0.9% 10 mL injection  20 mg IntraVENous DAILY  ondansetron (ZOFRAN) injection 4 mg  4 mg IntraVENous Q6H PRN  
 acetaminophen (TYLENOL) tablet 650 mg  650 mg Oral Q4H PRN  
 docusate sodium (COLACE) capsule 100 mg  100 mg Oral PRN  
 heparin (porcine) injection 5,000 Units  5,000 Units SubCUTAneous Q12H  aspirin delayed-release tablet 81 mg  81 mg Oral DAILY  bisacodyl (DULCOLAX) tablet 5 mg  5 mg Oral PRN  cholecalciferol (VITAMIN D3) tablet 1,000 Units  1,000 Units Oral DAILY  pravastatin (PRAVACHOL) tablet 40 mg  40 mg Oral DAILY  levothyroxine (SYNTHROID) tablet 150 mcg  150 mcg Oral ACB LAB AND IMAGING FINDINGS:  
 
Lab Results Component Value Date/Time WBC 4.8 06/25/2019 04:00 AM  
 HGB 13.0 06/25/2019 04:00 AM  
 PLATELET 478 (L) 16/02/6590 04:00 AM  
 
Lab Results Component Value Date/Time Sodium 140 06/26/2019 08:44 AM  
 Potassium 3.4 (L) 06/26/2019 08:44 AM  
 Chloride 109 (H) 06/26/2019 08:44 AM  
 CO2 25 06/26/2019 08:44 AM  
 BUN 28 (H) 06/26/2019 08:44 AM  
 Creatinine 1.24 06/26/2019 08:44 AM  
 Calcium 9.9 06/26/2019 08:44 AM  
 Magnesium 2.3 03/02/2019 04:06 AM  
 Phosphorus 2.1 (L) 04/21/2016 07:06 PM  
  
Lab Results Component Value Date/Time AST (SGOT) 18 06/23/2019 09:28 AM  
 Alk. phosphatase 114 06/23/2019 09:28 AM  
 Protein, total 8.6 (H) 06/23/2019 09:28 AM  
 Albumin 3.2 (L) 06/23/2019 09:28 AM  
 Globulin 5.4 (H) 06/23/2019 09:28 AM  
 
Lab Results Component Value Date/Time INR 1.1 06/23/2019 09:28 AM  
 Prothrombin time 11.5 (H) 06/23/2019 09:28 AM  
 aPTT 26.5 06/23/2019 09:28 AM  
  
Lab Results Component Value Date/Time Iron 47 03/03/2019 06:15 AM  
 TIBC 203 (L) 03/03/2019 06:15 AM  
 Iron % saturation 23 03/03/2019 06:15 AM  
  
No results found for: PH, PCO2, PO2 No components found for: [place] Lab Results Component Value Date/Time CK 43 12/13/2018 11:31 AM  
  
 
 
   
 
Total time: 65 minutes Counseling / coordination time, spent as noted above: 50 minutes 
> 50% counseling / coordination?: yes Prolonged service was provided for  [x]30 min   []75 min in face to face time in the presence of the patient, spent as noted above. Time Start:  1115am 
Time End:  12pm 
Note: this can only be billed with 20480 (initial) or 21 [phone] (follow up). If multiple start / stop times, list each separately.

## 2019-06-26 NOTE — ACP (ADVANCE CARE PLANNING)
Advanced care planning Primary Decision Maker: Lalo Timharry - [phone] Primary Decision Maker: Amarjit Mcmullen - Daughter - [phone] Pt has POA paperwork, but family uncertain it includes medical provisions. Both dtrs are NOK and decision today for DNR status. DDNR signed.

## 2019-06-26 NOTE — PROGRESS NOTES
Pharmacist Discharge Medication Reconciliation Discharging Provider: Yvrose Cintron Significant PMH:  
Past Medical History:  
Diagnosis Date Cancer [facility])   
 prostate DJD (degenerative joint disease) Elevated cholesterol GERD (gastroesophageal reflux disease) Hyperparathyroidism, primary ([address] Hypertension Obstructive sleep apnea   
 uses cpap Thyroid disease HX of Graves Disease Chief Complaint for this Admission: Chief Complaint Patient presents with Sweats Altered mental status Allergies: Patient has no known allergies. Discharge Medications:  
Current Discharge Medication List  
  
 
START taking these medications Details  
carvedilol (COREG) 3.125 mg tablet Take 1 Tab by mouth two (2) times daily (with meals). Qty: 60 Tab, Refills: 2  
  
ondansetron (ZOFRAN ODT) 4 mg disintegrating tablet Take 1 Tab by mouth every eight (8) hours as needed for Nausea. Qty: 30 Tab, Refills: 0 CONTINUE these medications which have CHANGED Details  
levothyroxine (SYNTHROID) 150 mcg tablet Take 1 Tab by mouth Daily (before breakfast). Qty: 30 Tab, Refills: 2 CONTINUE these medications which have NOT CHANGED Details  
pravastatin (PRAVACHOL) 40 mg tablet Take 1 Tab by mouth daily. Must have made doctor appointment for refill. Qty: 90 Tab, Refills: 0 Associated Diagnoses: Primary hypercholesterolemia  
  
bisacodyl (DULCOLAX) 5 mg EC tablet Take 1 Tab by mouth as needed for Constipation. Qty: 30 Tab, Refills: 0  
  
aspirin delayed-release 81 mg tablet Take 81 mg by mouth daily. cholecalciferol (VITAMIN D3) 1,000 unit tablet Take 1,000 Units by mouth daily. The patient's chart, MAR and AVS were reviewed by Khadra Noonan RP.

## 2019-06-26 NOTE — DISCHARGE INSTRUCTIONS
Monitor potassium levels periodically    Oxygen 2L via NC when napping and at night when sleeping    Regular diet with 2/nectar thick liq    DNR code status    Fall risk

## 2019-06-27 NOTE — PROGRESS NOTES
Hospital Discharge Follow-Up Date/Time:  6/27/2019 11:39 AM 
 
Patient was admitted to [facility] on 6/23 and discharged on 6/26/19 for Acute encephalopathy, delirium/ Acute on chronic systolic / diastolic CHF. . The physician discharge summary was available at the time of outreach. Patient was contacted within 1 business days of discharge. Hospital discharge note - included d/t extensive hx and significance. 1) CNS: Acute encephalopathy- NOS present on admission. Likely a case of delirium on baseline advanced dementia CT head neg for acute findings but noted was significant atrophy and ventriculomegaly. 
 Physical exam suspicious for acute CVA- he is already on aspirin and statin- 
 unable to get MRI due to pacemaker OT/PT eval- CM for SNF placement 
 normal cortisol and ammonia levels, thyroid meds adjusted 2) CVS: Acute on chronic combined Systolic and diastolic CHF. Chronic Afib. Fairly well controlled Primary Hypertension. Dyslipidemia. EF 46-50 percent (2/22/19). S/P Pacemaker placement. Continue oxygen, he is not a candidate for diuretics or ace/arb on DC due to severe hypotension,  Current 2DECHO and Heart failure Team eval. Cardiology eval noted and appreciated.   
3) Anticoagulation: [id number] score of at least 4 predisposing to an increased risk of thromboembolism. However due to him being such a high fall risk, will hold off on chronic anticoagulation Risks/benefits D/W family.   
4) GI: H/O GERD. Emesis. PPI and anti-emetics while inpatient   
5) Renal: mildly elevated creatinine related to dehydration from  poor PO intake related to cognitive impairment.  
  
6) Oncology: H/O Prostate Cancer. 
  
7) Endocrinology: H/O Graves Disease. H/O Thyroidectomy. TSH elevated at 22.90, normal FT4 1.1 Levothyroxine changed from 125 micrograms to 150 micrograms daily.  Will need outpatient F/U TFTs with further dose adjustment. 
  
 8) Resp: H/O Obstructive Sleep Apnea. - he desats even with oxygen via NC but usually wont go below 88% on 2L via NC 
  
9) Prophylaxis: DVT- hepain while inpatient. 
  
10) Directives: DNR with an extremely guarded prognosis. Family met with palliative care and hospice during this hospitalization 
  
11) Plan: Anticipate D/C to SNF tomorrow- checking ammonia and cortisol levels Top Challenges reviewed with the provider Dementia Fall risk Acute on Chronic CHF Evaluation for rehab/ vs Hospice. - Family wavering - wanted to try rehab. Method of communication with provider :chart routing, staff message, phone Inpatient RRAT score: 23 Was this a readmission? no  
Patient stated reason for the readmission: n/a Nurse Navigator (NN) contacted the caregiver by telephone to perform post hospital discharge assessment. Verified name and  with caregiver as identifiers. Provided introduction to self, and explanation of the Nurse Navigator role. Reviewed discharge instructions and red flags with caregiver who verbalized understanding. Caregiver given an opportunity to ask questions and does not have any further questions or concerns at this time. The caregiver agrees to contact the PCP office for questions related to their healthcare. NN provided contact information for future reference. Disease Specific:   CHF Summary of patient's top problems: 1. Altered mental status - dementia 2. Acute on chronic CHF 3. Fall risk/ debility -  
4. Multi-comorbid conditions Heart Failure Note Do you have a Scale:    yes How often do you weigh:  if ongoing rehab - daily weights - if home Hospice - n/a 
 
Zone:(Pt Reported)  green EF: 19 on 19 Type of HF:   HFrEF (reduction in EF - ; Acute on chronic combined diastolic and systolic HF). · Low output low gradient AS, mean gradient is underestimated. Aortic Valve: Moderate aortic valve sclerosis with stenosis.  Aortic valve mean gradient is 12 mmHg. Moderate to severe aortic valve stenosis is present. Mild aortic valve regurgitation is present. · Left Ventricle: Mildly dilated left ventricle. Moderate-to-severe systolic dysfunction. Calculated left ventricular ejection fraction is 20%. . Abnormal left ventricular wall motion. Apical severe hypokinesis. · Mitral Valve: Mitral valve thickening. Mild mitral valve regurgitation. · Tricuspid Valve: Mild tricuspid valve regurgitation is present. · Pulmonic Valve: Mild pulmonic valve regurgitation is present. Cardiac Device present: ICD Heart Failure Medications: [employer] Home Health orders at discharge: n/a ; d/c to [facility] rehab Durable Medical Equipment ordered/company: d/c on 2 l/ oxygen continuous - discussed with rehab. Durable Medical Equipment received: oxygen 2 liters - Barriers to care? ineffective coping, lack of knowledge about disease, stages of grief, transportation, utilization of services Advance Care Planning:  
Does patient have an Advance Directive:  reviewed and current Medication(s):  
New Medications at Discharge: Coreg 3.125 mg bid, Zofran 4 mg prn / nausea Changed Medications at Discharge: Levothyroxine 150 mcg qd Discontinued Medications at Discharge: none Medication reconciliation was performed with caregiver, who verbalizes understanding of administration of home medications. There were no barriers to obtaining medications identified at this time. Referral to Pharm D needed: no  
 
[facility] follow up appointment(s): No future appointments. Pt d/c to SNF- possible transition to Hospice. Non-[facility] follow up appointment(s): none [facility]:  n/a  
 
Goals  Facilitate transitions of care (ie. palliative care, assisted living, nursing home, changes in living arrangements). 6/27/19 Pt d/c to [address] [facility] rehab - for evaluation to see if he can do therapy -  
Condition guarded -  
 PT to evaluate today ; if unable to participate - to transition to [facility] does not do Hospice care. Call to daughter and discussed; call to [address] to discuss. ttk Armando Segovia RN , CHFN, CCM 
NN SUKI Hilton [phone]

## 2019-07-01 NOTE — PROGRESS NOTES
Nurse navigator note - cardiology [address] rehab and pt had been discharged. Call to [facility] - and notes reviewed in [facility] care - admission to Hospice on 6/28 - Rishi Gil - Visit notes reviewed - Pt has 24/7 caretakers at home; lives with daughter, and grand daughter - Will follow - with Hospice notes. [email or web address]

## 2019-07-03 NOTE — PROGRESS NOTES
Community Care Team documentation for patient in [facility] Initial Follow Up Patient was discharged to [facility]. Information included in this progress note has been provided to SNF. Hospital Admission and Diagnosis:  [facility] 6/23-6/26 CNS: Acute encephalopathy RRAT Score:   23 Advance Care Planning: DDNR on file PCP : Deepika Doss MD 
 
SNF Attending:  Nelda Hill MD 
 
Spoke with SNF team. Patient discharged from [facility] skilled nursing 6/27 with [address] per daughters choice. Community Care Team will follow up weekly with [facility] until discharge. Medications were not reconciled and general patient assessment was not completed during this [facility] outreach.

## 2019-07-16 NOTE — PROGRESS NOTES
Nurse navigator note - cardiology Pt in CHF bundle - enrolled in Hospice services - with [facility]. 7/15 nursing visit -  
Current data - Visit as closed on 7/15/2019 by Carmen Scott RN at  3:37 PM  
pt in bed with his eyes closed, eyebrows furrowed, both Amadeo Campa and Cut off report patient has not had any pain or complained of patient, instructed that facial grimacing could be indictive of pain and pt unable to self report. pt noted to have fast respirations follow by periods of not breathing family reports patient has sleep apnea, but reports apnea lasting longer, discussed apnea an hypotension could be related to patient decline. SAT 98%RA, apical pulse very weak and distant very difficult to hear, right flank noted to be swollen but soft to palpation, not discomfort noted AEB no increased facial grimacing. reviewed other signs and symptoms of EOL and steps to take when pt stops breathing, encouraged calling hospice with questions, concerns or further changes in patient status. both Ceci gray and Amadeo Campa verbalized understanding Per notes - pt has good family support - and care - attentive family with aides helping. Nati Ryan RN , CHFN, [facility] Care Transitions Nurse - CHF bundle [phone]

## 2019-07-29 NOTE — PROGRESS NOTES
Nurse navigator note - cardiology  Mr. Jonathan Dickinson - with CHF bundle - remains in [address] with visit from nursing today - 7/29/19  ttk

## 2019-10-01 NOTE — PROGRESS NOTES
Nurse navigator note - cardiology Mr. Jason Foote continues with [facility] - his bundle period is closed -  
Hospice nurse visit scheduled for today - 
Episode closed - goals closed - removed from care team - Rolf Edouard RN , CHFN, [facility] Care transitions nurse/ CHF bundle [phone]

## 2019-12-24 NOTE — FACE TO FACE
[facility]   Good Help to Those in Need  FACE TO Lady Tatum  [phone]    Patient Name: Cedrick Murillo  YOB: 1931    Date of Face to Face Visit: 12/23/19    Location of Visit:  [] 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 year old who is being evaluated for re-certification for Hospice services. Since admission to Hospice on 6/28/2019  for heart disease, unspecified (principle hospice diagnosis), the patient has demonstrated the following signs/symptoms: he continues to decline further as evidenced by decreasing appetite, weight loss, decreased alertness and sleeping most of the hours out of 24. Patient is dependent for all care including feeding. Karnofsky score is 20 at this time as he is minimally responsive. He c/o pain in his legs at night. GOALS OF CARE     Resuscitation Status: DNR  Durable DNR: [x] Yes [] No      Primary Decision MakerCelia Monday Child - [phone]    Primary Decision Maker: Dorthula Galeazzi - Daughter - [phone]    Advance Care Planning 6/24/2019   Patient's Healthcare Decision Maker is: -   Confirm Advance Directive None   Does the patient have other document types -        HISTORY     History obtained from: chart, interdisciplinary team, and aide.     CHIEF COMPLAINT: per aide, patient is c/o leg pain at night and rubs his legs  The patient is:   [] Verbal  [x] Nonverbal, opens eyes briefly to name call  [] Unresponsive    HPI/SUBJECTIVE:   Patient not speaking or interacting. Per aide, he is weaker, sleeping more, eating less. I reviewed interdisciplinary assessments for this benefit period in the patient's chart. DISEASE SPECIFIC INFORMATION             If Dementia:       FUNCTIONAL ASSESSMENT     Palliative Performance Scale:  20 (PPS):     PHYSICAL EXAM     Wt Readings from Last 3 Encounters:   06/26/19 205 lb (93 kg)   03/01/19 220 lb 10.9 oz (100.1 kg)   12/13/18 230 lb (104.3 kg)       Previous weight:N/A  Current weight:N/A    Previous MAC:24.5  Current MAC:23       Currently this patient has:    Supplemental O2  [] Yes  [x] NO   [] PORT  [] ICD    [] Owens Catheter  [] PEG Tube    [] Drain   [] Other:    Constitutional: frail elderly man who briefly opens eyes to name call X 1 and after is unresponsive to sternal rub  Eyes: pupils equal, anicteric  ENMT: no nasal discharge, moist mucous membranes  Cardiovascular: irregularly irregular rhythm, distal pulses trace, feet cold  Respiratory: accessory muscle use, RR of 24, rales in bases  Gastrointestinal: soft non-tender, +bowel sounds  Musculoskeletal: no deformity, no tenderness to palpation  Skin: warm, dry  Neurologic: not responding  Psychiatric: not responding  Other:          Will coordinate care with team to discuss the need for scheduled pain meds at night for leg pain.

## 2020-01-01 ENCOUNTER — HOME CARE VISIT (OUTPATIENT)
Dept: SCHEDULING | Facility: HOME HEALTH | Age: 85
End: 2020-01-01
Payer: MEDICARE

## 2020-01-01 ENCOUNTER — HOME CARE VISIT (OUTPATIENT)
Dept: HOSPICE | Facility: HOSPICE | Age: 85
End: 2020-01-01
Payer: MEDICARE

## 2020-01-01 VITALS
HEART RATE: 54 BPM | DIASTOLIC BLOOD PRESSURE: 92 MMHG | SYSTOLIC BLOOD PRESSURE: 130 MMHG | OXYGEN SATURATION: 92 % | RESPIRATION RATE: 12 BRPM

## 2020-01-01 VITALS — SYSTOLIC BLOOD PRESSURE: 110 MMHG | DIASTOLIC BLOOD PRESSURE: 70 MMHG | HEART RATE: 77 BPM | RESPIRATION RATE: 12 BRPM

## 2020-01-01 VITALS — RESPIRATION RATE: 16 BRPM | DIASTOLIC BLOOD PRESSURE: 58 MMHG | SYSTOLIC BLOOD PRESSURE: 100 MMHG | HEART RATE: 88 BPM

## 2020-01-01 VITALS
DIASTOLIC BLOOD PRESSURE: 78 MMHG | OXYGEN SATURATION: 94 % | SYSTOLIC BLOOD PRESSURE: 98 MMHG | TEMPERATURE: 98.5 F | HEART RATE: 81 BPM | RESPIRATION RATE: 13 BRPM

## 2020-01-01 VITALS
SYSTOLIC BLOOD PRESSURE: 139 MMHG | DIASTOLIC BLOOD PRESSURE: 81 MMHG | RESPIRATION RATE: 10 BRPM | TEMPERATURE: 97.4 F | OXYGEN SATURATION: 94 % | HEART RATE: 74 BPM

## 2020-01-01 VITALS — HEART RATE: 67 BPM | RESPIRATION RATE: 26 BRPM | TEMPERATURE: 97.8 F | OXYGEN SATURATION: 89 %

## 2020-01-01 VITALS — RESPIRATION RATE: 16 BRPM | HEART RATE: 110 BPM | DIASTOLIC BLOOD PRESSURE: 48 MMHG | SYSTOLIC BLOOD PRESSURE: 90 MMHG

## 2020-01-01 VITALS
RESPIRATION RATE: 28 BRPM | HEART RATE: 117 BPM | DIASTOLIC BLOOD PRESSURE: 57 MMHG | TEMPERATURE: 99.2 F | OXYGEN SATURATION: 88 % | SYSTOLIC BLOOD PRESSURE: 111 MMHG

## 2020-01-01 VITALS
OXYGEN SATURATION: 91 % | DIASTOLIC BLOOD PRESSURE: 68 MMHG | RESPIRATION RATE: 12 BRPM | SYSTOLIC BLOOD PRESSURE: 110 MMHG | HEART RATE: 77 BPM

## 2020-01-01 VITALS
HEART RATE: 70 BPM | RESPIRATION RATE: 14 BRPM | DIASTOLIC BLOOD PRESSURE: 75 MMHG | SYSTOLIC BLOOD PRESSURE: 126 MMHG | TEMPERATURE: 97.8 F | OXYGEN SATURATION: 80 %

## 2020-01-01 VITALS — HEART RATE: 83 BPM | SYSTOLIC BLOOD PRESSURE: 108 MMHG | DIASTOLIC BLOOD PRESSURE: 72 MMHG

## 2020-01-01 PROCEDURE — G0299 HHS/HOSPICE OF RN EA 15 MIN: HCPCS

## 2020-01-01 PROCEDURE — 0651 HSPC ROUTINE HOME CARE

## 2020-01-01 PROCEDURE — T4523 ADULT SIZE BRIEF/DIAPER LG: HCPCS

## 2020-01-01 PROCEDURE — G0156 HHCP-SVS OF AIDE,EA 15 MIN: HCPCS

## 2020-01-01 PROCEDURE — G0300 HHS/HOSPICE OF LPN EA 15 MIN: HCPCS

## 2020-01-01 PROCEDURE — 3331090004 HSPC SERVICE INTENSITY ADD-ON

## 2020-01-01 RX ADMIN — MORPHINE SULFATE 10 MG: 20 SOLUTION ORAL at 12:00

## 2020-01-17 ENCOUNTER — HOME CARE VISIT (OUTPATIENT)
Dept: HOSPICE | Facility: HOSPICE | Age: 85
End: 2020-01-17
Payer: MEDICARE

## 2021-10-18 NOTE — PROGRESS NOTES
Nurse navigator note - cardiology  Pt continues in Hospice care with [facility] - last visit 8/21    Actions Performed -Hospice nurse note 8/21 - Margarita Castrejon  Current data - Visit as closed on 8/21/2019 by Norval Najjar, RN at  4:01 PM   Orientation: Nora Anderson to himself, knows Cut off. Appetite:  Frosted flakes and a lot of milkshakes and chocolate. Mobility:  Bed bound. Skin:  Clean, dry and intact. GI/:  Incontinent-disimpacted two large hard stools.     Respiratory:  Diminished. Cardio:  Apical pulse palpable. Medication reconciliation:  Needs senna. Supplies needed:  None needed. Medication needed:  Senna-reconciled with Cut off. Other concerns noted:  Patient constipated, gave education to Cut off that if patient has not had a bowel movement this evening to give a suppository.  Showed her how to administer.  Patient reportedly not as confused since he has been on an antibiotic, still somewhat pleasantly confused.  Will notify RNCM to increase senna and make sure patient is not impacted at next visit. Jaja Hurst to contact hopce at any time with questions.      Next visit 8/28 -   CHF bundle 6/28-9/28/19    Gordon Sanford RN , [facility], [facility] bundle/ Hospice care  [phone]
Discharge summary faxed to [facility] [phone] on 10/18 at 12pm

## 2023-06-02 NOTE — PROGRESS NOTES
Occupational therapy  -  
02.27.2019 Chart reviewed in prep for OT treatment, RN cleared patient to be seen. Patient received lying on L side in bed with daughter at bedside. Patient opened eyes momentarily and then closed them and would not open his eyes again. Patient refusing any in bed or OOB activities, even with encouragement from therapist and family. Will defer at this time and f/u later in PM as able and appropriate. Thank you. Ld Gonzalez MS, OTR/L 
 What Is The Reason For Today's Visit?: Full Body Skin Examination What Is The Reason For Today's Visit? (Being Monitored For X): concerning skin lesions on an annual basis

## 2025-06-04 NOTE — PROGRESS NOTES
1301:  TRANSFER - IN REPORT: 
 
Verbal report received from [facility] on Delroy Walton , from the Cardiac Cath lab, for routine progression of care. Report consisted of patients Situation, Background, Assessment and Recommendations(SBAR). Information from the following report(s) Procedure Summary, Intake/Output, MAR and Recent Results was reviewed with the receiving clinician. Opportunity for questions and clarification was provided. Assessment completed upon patients arrival to [address] [place] [address] and care assumed. Cardiac Cath Lab Recovery Arrival Note: 
 
 Delroy Walton arrived to [facility] recovery area. Patient procedure= Leadless pacer and AV node ablation. Patient on cardiac monitor, non-invasive blood pressure, Patient status doing well without problems. Patient is A&Ox 4. Patient reports no complaints. Procedure site without any bleeding and no hematoma. SC/yes

## (undated) DEVICE — PINNACLE INTRODUCER SHEATH: Brand: PINNACLE

## (undated) DEVICE — Device: Brand: PADPRO

## (undated) DEVICE — NEEDLE ANGIO 18GAX7CM SECURELOC

## (undated) DEVICE — Device

## (undated) DEVICE — AMPLATZ EXTRA STIFF WIRE GUIDE: Brand: AMPLATZ

## (undated) DEVICE — SUT PROL 0 30IN CT1 BLU --

## (undated) DEVICE — TEMPERATURE ABLATION CATHETER: Brand: BLAZER® II XP

## (undated) DEVICE — STERILE POLYISOPRENE POWDER-FREE SURGICAL GLOVES: Brand: PROTEXIS

## (undated) DEVICE — PRESSURE MONITORING SET: Brand: TRUWAVE

## (undated) DEVICE — ANGIOGRAPHIC CATHETER: Brand: IMPULSE™

## (undated) DEVICE — RADIFOCUS GLIDEWIRE: Brand: GLIDEWIRE

## (undated) DEVICE — NON-REM POLYHESIVE PATIENT RETURN ELECTRODE: Brand: VALLEYLAB

## (undated) DEVICE — SOLID-TIP ABLATION CATHETER CABLE, STERILE CABLE: Brand: BLAZER™

## (undated) DEVICE — 3M™ TEGADERM™ TRANSPARENT FILM DRESSING FRAME STYLE, 1626W, 4 IN X 4-3/4 IN (10 CM X 12 CM), 50/CT 4CT/CASE: Brand: 3M™ TEGADERM™

## (undated) DEVICE — PACK PROCEDURE SURG HRT CATH

## (undated) DEVICE — FASCIAL DILATOR SET: Brand: COOK

## (undated) DEVICE — GUIDEWIRE VASC L150CM DIA0.035IN TIP L3MM PTFE J CRV FIX

## (undated) DEVICE — INTRO SHTH CATH 23F 55.7CM --